# Patient Record
Sex: MALE | Race: OTHER | Employment: UNEMPLOYED | URBAN - METROPOLITAN AREA
[De-identification: names, ages, dates, MRNs, and addresses within clinical notes are randomized per-mention and may not be internally consistent; named-entity substitution may affect disease eponyms.]

---

## 2020-01-01 ENCOUNTER — APPOINTMENT (OUTPATIENT)
Dept: GENERAL RADIOLOGY | Age: 80
DRG: 161 | End: 2020-01-01
Attending: INTERNAL MEDICINE
Payer: MEDICAID

## 2020-01-01 ENCOUNTER — APPOINTMENT (OUTPATIENT)
Dept: CT IMAGING | Age: 80
DRG: 161 | End: 2020-01-01
Attending: PSYCHIATRY & NEUROLOGY
Payer: MEDICAID

## 2020-01-01 ENCOUNTER — APPOINTMENT (OUTPATIENT)
Dept: CT IMAGING | Age: 80
DRG: 161 | End: 2020-01-01
Attending: INTERNAL MEDICINE
Payer: MEDICAID

## 2020-01-01 ENCOUNTER — HOSPITAL ENCOUNTER (EMERGENCY)
Age: 80
Discharge: ANOTHER HOSPITAL | DRG: 161 | End: 2020-09-22
Attending: EMERGENCY MEDICINE
Payer: MEDICAID

## 2020-01-01 ENCOUNTER — APPOINTMENT (OUTPATIENT)
Dept: GENERAL RADIOLOGY | Age: 80
DRG: 161 | End: 2020-01-01
Attending: NURSE PRACTITIONER
Payer: MEDICAID

## 2020-01-01 ENCOUNTER — HOSPITAL ENCOUNTER (INPATIENT)
Age: 80
LOS: 11 days | DRG: 161 | End: 2020-10-03
Attending: INTERNAL MEDICINE | Admitting: INTERNAL MEDICINE
Payer: MEDICAID

## 2020-01-01 VITALS
WEIGHT: 180 LBS | HEART RATE: 99 BPM | DIASTOLIC BLOOD PRESSURE: 99 MMHG | BODY MASS INDEX: 28.93 KG/M2 | RESPIRATION RATE: 28 BRPM | OXYGEN SATURATION: 94 % | SYSTOLIC BLOOD PRESSURE: 176 MMHG | TEMPERATURE: 97.8 F | HEIGHT: 66 IN

## 2020-01-01 VITALS
WEIGHT: 182.98 LBS | RESPIRATION RATE: 40 BRPM | BODY MASS INDEX: 29.41 KG/M2 | DIASTOLIC BLOOD PRESSURE: 101 MMHG | SYSTOLIC BLOOD PRESSURE: 120 MMHG | HEIGHT: 66 IN | TEMPERATURE: 97.5 F | OXYGEN SATURATION: 80 %

## 2020-01-01 DIAGNOSIS — G93.40 ENCEPHALOPATHY: ICD-10-CM

## 2020-01-01 DIAGNOSIS — I95.89 OTHER SPECIFIED HYPOTENSION: ICD-10-CM

## 2020-01-01 DIAGNOSIS — R93.2 ABNORMAL CT SCAN, GALLBLADDER: ICD-10-CM

## 2020-01-01 DIAGNOSIS — J18.9 PNEUMONIA DUE TO INFECTIOUS ORGANISM, UNSPECIFIED LATERALITY, UNSPECIFIED PART OF LUNG: ICD-10-CM

## 2020-01-01 DIAGNOSIS — I21.09 ACUTE MI ANTERIOR WALL FIRST EPISODE CARE (HCC): Primary | ICD-10-CM

## 2020-01-01 DIAGNOSIS — N17.9 ACUTE RENAL FAILURE, UNSPECIFIED ACUTE RENAL FAILURE TYPE (HCC): ICD-10-CM

## 2020-01-01 DIAGNOSIS — R13.10 DYSPHAGIA, UNSPECIFIED TYPE: ICD-10-CM

## 2020-01-01 DIAGNOSIS — R54 FRAILTY: ICD-10-CM

## 2020-01-01 DIAGNOSIS — R57.0 CARDIOGENIC SHOCK (HCC): ICD-10-CM

## 2020-01-01 DIAGNOSIS — E11.9 CONTROLLED TYPE 2 DIABETES MELLITUS WITHOUT COMPLICATION, WITHOUT LONG-TERM CURRENT USE OF INSULIN (HCC): ICD-10-CM

## 2020-01-01 DIAGNOSIS — I47.29 NSVT (NONSUSTAINED VENTRICULAR TACHYCARDIA): ICD-10-CM

## 2020-01-01 DIAGNOSIS — Z51.5 ENCOUNTER FOR PALLIATIVE CARE: ICD-10-CM

## 2020-01-01 DIAGNOSIS — Z71.89 ACP (ADVANCE CARE PLANNING): ICD-10-CM

## 2020-01-01 DIAGNOSIS — I10 ESSENTIAL HYPERTENSION: ICD-10-CM

## 2020-01-01 DIAGNOSIS — I21.02 ACUTE ST ELEVATION MYOCARDIAL INFARCTION (STEMI) INVOLVING LEFT ANTERIOR DESCENDING (LAD) CORONARY ARTERY (HCC): ICD-10-CM

## 2020-01-01 DIAGNOSIS — R74.8 ELEVATED LIPASE: ICD-10-CM

## 2020-01-01 DIAGNOSIS — R10.84 GENERALIZED ABDOMINAL PAIN: ICD-10-CM

## 2020-01-01 DIAGNOSIS — I21.02 ST ELEVATION MYOCARDIAL INFARCTION INVOLVING LEFT ANTERIOR DESCENDING (LAD) CORONARY ARTERY (HCC): ICD-10-CM

## 2020-01-01 DIAGNOSIS — R06.02 SHORTNESS OF BREATH: ICD-10-CM

## 2020-01-01 DIAGNOSIS — R41.0 DELIRIUM: ICD-10-CM

## 2020-01-01 LAB
ACT BLD: 120 SECS (ref 70–128)
ACT BLD: 142 SECS (ref 70–128)
ACT BLD: 142 SECS (ref 70–128)
ACT BLD: 147 SECS (ref 70–128)
ACT BLD: 153 SECS (ref 70–128)
ACT BLD: 158 SECS (ref 70–128)
ACT BLD: 164 SECS (ref 70–128)
ACT BLD: 169 SECS (ref 70–128)
ACT BLD: 175 SECS (ref 70–128)
ALBUMIN SERPL-MCNC: 2 G/DL (ref 3.2–4.6)
ALBUMIN SERPL-MCNC: 2.8 G/DL (ref 3.2–4.6)
ALBUMIN SERPL-MCNC: 4.5 G/DL (ref 3.2–4.6)
ALBUMIN/GLOB SERPL: 0.4 {RATIO} (ref 1.2–3.5)
ALBUMIN/GLOB SERPL: 0.6 {RATIO} (ref 1.2–3.5)
ALBUMIN/GLOB SERPL: 1 {RATIO} (ref 1.2–3.5)
ALP SERPL-CCNC: 149 U/L (ref 50–136)
ALP SERPL-CCNC: 178 U/L (ref 50–136)
ALP SERPL-CCNC: 86 U/L (ref 50–136)
ALT SERPL-CCNC: 421 U/L (ref 12–65)
ALT SERPL-CCNC: 53 U/L (ref 12–65)
ALT SERPL-CCNC: 58 U/L (ref 12–65)
ANION GAP SERPL CALC-SCNC: 10 MMOL/L (ref 7–16)
ANION GAP SERPL CALC-SCNC: 10 MMOL/L (ref 7–16)
ANION GAP SERPL CALC-SCNC: 11 MMOL/L (ref 7–16)
ANION GAP SERPL CALC-SCNC: 13 MMOL/L (ref 7–16)
ANION GAP SERPL CALC-SCNC: 15 MMOL/L (ref 7–16)
ANION GAP SERPL CALC-SCNC: 16 MMOL/L (ref 7–16)
ANION GAP SERPL CALC-SCNC: 7 MMOL/L (ref 7–16)
ANION GAP SERPL CALC-SCNC: 7 MMOL/L (ref 7–16)
ANION GAP SERPL CALC-SCNC: 9 MMOL/L (ref 7–16)
APPEARANCE UR: ABNORMAL
APTT PPP: 61 SEC (ref 24.3–35.4)
ARTERIAL PATENCY WRIST A: YES
AST SERPL-CCNC: 1531 U/L (ref 15–37)
AST SERPL-CCNC: 37 U/L (ref 15–37)
AST SERPL-CCNC: 87 U/L (ref 15–37)
ATRIAL RATE: 100 BPM
ATRIAL RATE: 85 BPM
ATRIAL RATE: 86 BPM
ATRIAL RATE: 91 BPM
ATRIAL RATE: 97 BPM
BACTERIA SPEC CULT: NORMAL
BACTERIA URNS QL MICRO: 0 /HPF
BASE EXCESS BLD CALC-SCNC: 3 MMOL/L
BASOPHILS # BLD: 0 K/UL (ref 0–0.2)
BASOPHILS # BLD: 0.1 K/UL (ref 0–0.2)
BASOPHILS NFR BLD: 0 % (ref 0–2)
BASOPHILS NFR BLD: 1 % (ref 0–2)
BASOPHILS NFR BLD: 1 % (ref 0–2)
BDY SITE: ABNORMAL
BILIRUB DIRECT SERPL-MCNC: 0.4 MG/DL
BILIRUB DIRECT SERPL-MCNC: 0.5 MG/DL
BILIRUB INDIRECT SERPL-MCNC: 2.2 MG/DL (ref 0–1.1)
BILIRUB SERPL-MCNC: 0.6 MG/DL (ref 0.2–1.1)
BILIRUB SERPL-MCNC: 0.9 MG/DL (ref 0.2–1.1)
BILIRUB SERPL-MCNC: 2.7 MG/DL (ref 0.2–1.1)
BILIRUB UR QL: ABNORMAL
BUN SERPL-MCNC: 104 MG/DL (ref 8–23)
BUN SERPL-MCNC: 105 MG/DL (ref 8–23)
BUN SERPL-MCNC: 112 MG/DL (ref 8–23)
BUN SERPL-MCNC: 12 MG/DL (ref 8–23)
BUN SERPL-MCNC: 31 MG/DL (ref 8–23)
BUN SERPL-MCNC: 41 MG/DL (ref 8–23)
BUN SERPL-MCNC: 46 MG/DL (ref 8–23)
BUN SERPL-MCNC: 46 MG/DL (ref 8–23)
BUN SERPL-MCNC: 47 MG/DL (ref 8–23)
BUN SERPL-MCNC: 60 MG/DL (ref 8–23)
BUN SERPL-MCNC: 72 MG/DL (ref 8–23)
CALCIUM SERPL-MCNC: 10.1 MG/DL (ref 8.3–10.4)
CALCIUM SERPL-MCNC: 7.8 MG/DL (ref 8.3–10.4)
CALCIUM SERPL-MCNC: 8.2 MG/DL (ref 8.3–10.4)
CALCIUM SERPL-MCNC: 8.4 MG/DL (ref 8.3–10.4)
CALCIUM SERPL-MCNC: 8.5 MG/DL (ref 8.3–10.4)
CALCIUM SERPL-MCNC: 8.5 MG/DL (ref 8.3–10.4)
CALCIUM SERPL-MCNC: 8.6 MG/DL (ref 8.3–10.4)
CALCIUM SERPL-MCNC: 8.7 MG/DL (ref 8.3–10.4)
CALCIUM SERPL-MCNC: 9 MG/DL (ref 8.3–10.4)
CALCULATED P AXIS, ECG09: 50 DEGREES
CALCULATED P AXIS, ECG09: 52 DEGREES
CALCULATED P AXIS, ECG09: 54 DEGREES
CALCULATED P AXIS, ECG09: 57 DEGREES
CALCULATED P AXIS, ECG09: 72 DEGREES
CALCULATED R AXIS, ECG10: -64 DEGREES
CALCULATED R AXIS, ECG10: -72 DEGREES
CALCULATED R AXIS, ECG10: -79 DEGREES
CALCULATED R AXIS, ECG10: 46 DEGREES
CALCULATED R AXIS, ECG10: 50 DEGREES
CALCULATED T AXIS, ECG11: 43 DEGREES
CALCULATED T AXIS, ECG11: 53 DEGREES
CALCULATED T AXIS, ECG11: 87 DEGREES
CALCULATED T AXIS, ECG11: 88 DEGREES
CALCULATED T AXIS, ECG11: 95 DEGREES
CHLORIDE SERPL-SCNC: 100 MMOL/L (ref 98–107)
CHLORIDE SERPL-SCNC: 101 MMOL/L (ref 98–107)
CHLORIDE SERPL-SCNC: 101 MMOL/L (ref 98–107)
CHLORIDE SERPL-SCNC: 102 MMOL/L (ref 98–107)
CHLORIDE SERPL-SCNC: 103 MMOL/L (ref 98–107)
CHLORIDE SERPL-SCNC: 103 MMOL/L (ref 98–107)
CHLORIDE SERPL-SCNC: 105 MMOL/L (ref 98–107)
CHLORIDE SERPL-SCNC: 98 MMOL/L (ref 98–107)
CHLORIDE SERPL-SCNC: 99 MMOL/L (ref 98–107)
CHOLEST SERPL-MCNC: 132 MG/DL
CO2 BLD-SCNC: 27 MMOL/L
CO2 SERPL-SCNC: 15 MMOL/L (ref 21–32)
CO2 SERPL-SCNC: 21 MMOL/L (ref 21–32)
CO2 SERPL-SCNC: 22 MMOL/L (ref 21–32)
CO2 SERPL-SCNC: 23 MMOL/L (ref 21–32)
CO2 SERPL-SCNC: 25 MMOL/L (ref 21–32)
CO2 SERPL-SCNC: 26 MMOL/L (ref 21–32)
CO2 SERPL-SCNC: 26 MMOL/L (ref 21–32)
CO2 SERPL-SCNC: 28 MMOL/L (ref 21–32)
CO2 SERPL-SCNC: 28 MMOL/L (ref 21–32)
CO2 SERPL-SCNC: 30 MMOL/L (ref 21–32)
CO2 SERPL-SCNC: 30 MMOL/L (ref 21–32)
COLLECT TIME,HTIME: 1607
COLOR UR: ABNORMAL
CREAT SERPL-MCNC: 1.42 MG/DL (ref 0.8–1.5)
CREAT SERPL-MCNC: 2.7 MG/DL (ref 0.8–1.5)
CREAT SERPL-MCNC: 3.44 MG/DL (ref 0.8–1.5)
CREAT SERPL-MCNC: 3.57 MG/DL (ref 0.8–1.5)
CREAT SERPL-MCNC: 4 MG/DL (ref 0.8–1.5)
CREAT SERPL-MCNC: 4.29 MG/DL (ref 0.8–1.5)
CREAT SERPL-MCNC: 5.01 MG/DL (ref 0.8–1.5)
CREAT SERPL-MCNC: 6.49 MG/DL (ref 0.8–1.5)
CREAT SERPL-MCNC: 6.7 MG/DL (ref 0.8–1.5)
CREAT SERPL-MCNC: 6.98 MG/DL (ref 0.8–1.5)
CREAT SERPL-MCNC: 7.63 MG/DL (ref 0.8–1.5)
DIAGNOSIS, 93000: NORMAL
DIFFERENTIAL METHOD BLD: ABNORMAL
EOSINOPHIL # BLD: 0 K/UL (ref 0–0.8)
EOSINOPHIL # BLD: 0.1 K/UL (ref 0–0.8)
EOSINOPHIL # BLD: 0.2 K/UL (ref 0–0.8)
EOSINOPHIL # BLD: 0.2 K/UL (ref 0–0.8)
EOSINOPHIL # BLD: 0.3 K/UL (ref 0–0.8)
EOSINOPHIL NFR BLD: 0 % (ref 0.5–7.8)
EOSINOPHIL NFR BLD: 1 % (ref 0.5–7.8)
EOSINOPHIL NFR BLD: 2 % (ref 0.5–7.8)
EPI CELLS #/AREA URNS HPF: ABNORMAL /HPF
ERYTHROCYTE [DISTWIDTH] IN BLOOD BY AUTOMATED COUNT: 13.2 % (ref 11.9–14.6)
ERYTHROCYTE [DISTWIDTH] IN BLOOD BY AUTOMATED COUNT: 13.6 % (ref 11.9–14.6)
ERYTHROCYTE [DISTWIDTH] IN BLOOD BY AUTOMATED COUNT: 14.2 % (ref 11.9–14.6)
ERYTHROCYTE [DISTWIDTH] IN BLOOD BY AUTOMATED COUNT: 14.4 % (ref 11.9–14.6)
ERYTHROCYTE [DISTWIDTH] IN BLOOD BY AUTOMATED COUNT: 14.5 % (ref 11.9–14.6)
ERYTHROCYTE [DISTWIDTH] IN BLOOD BY AUTOMATED COUNT: 14.6 % (ref 11.9–14.6)
ERYTHROCYTE [DISTWIDTH] IN BLOOD BY AUTOMATED COUNT: 14.6 % (ref 11.9–14.6)
ERYTHROCYTE [DISTWIDTH] IN BLOOD BY AUTOMATED COUNT: 14.7 % (ref 11.9–14.6)
ERYTHROCYTE [DISTWIDTH] IN BLOOD BY AUTOMATED COUNT: 14.7 % (ref 11.9–14.6)
ERYTHROCYTE [DISTWIDTH] IN BLOOD BY AUTOMATED COUNT: 14.8 % (ref 11.9–14.6)
ERYTHROCYTE [DISTWIDTH] IN BLOOD BY AUTOMATED COUNT: 14.9 % (ref 11.9–14.6)
ERYTHROCYTE [DISTWIDTH] IN BLOOD BY AUTOMATED COUNT: 15.2 % (ref 11.9–14.6)
ERYTHROCYTE [DISTWIDTH] IN BLOOD BY AUTOMATED COUNT: 15.3 % (ref 11.9–14.6)
ERYTHROCYTE [DISTWIDTH] IN BLOOD BY AUTOMATED COUNT: 15.4 % (ref 11.9–14.6)
ERYTHROCYTE [DISTWIDTH] IN BLOOD BY AUTOMATED COUNT: 15.8 % (ref 11.9–14.6)
EST. AVERAGE GLUCOSE BLD GHB EST-MCNC: 183 MG/DL
FLOW RATE ISTAT,IFRATE: 3 L/MIN
GAS FLOW.O2 O2 DELIVERY SYS: ABNORMAL L/MIN
GLOBULIN SER CALC-MCNC: 4.4 G/DL (ref 2.3–3.5)
GLOBULIN SER CALC-MCNC: 4.6 G/DL (ref 2.3–3.5)
GLOBULIN SER CALC-MCNC: 5 G/DL (ref 2.3–3.5)
GLUCOSE BLD STRIP.AUTO-MCNC: 100 MG/DL (ref 65–100)
GLUCOSE BLD STRIP.AUTO-MCNC: 102 MG/DL (ref 65–100)
GLUCOSE BLD STRIP.AUTO-MCNC: 116 MG/DL (ref 65–100)
GLUCOSE BLD STRIP.AUTO-MCNC: 119 MG/DL (ref 65–100)
GLUCOSE BLD STRIP.AUTO-MCNC: 121 MG/DL (ref 65–100)
GLUCOSE BLD STRIP.AUTO-MCNC: 135 MG/DL (ref 65–100)
GLUCOSE BLD STRIP.AUTO-MCNC: 149 MG/DL (ref 65–100)
GLUCOSE BLD STRIP.AUTO-MCNC: 154 MG/DL (ref 65–100)
GLUCOSE BLD STRIP.AUTO-MCNC: 184 MG/DL (ref 65–100)
GLUCOSE BLD STRIP.AUTO-MCNC: 192 MG/DL (ref 65–100)
GLUCOSE BLD STRIP.AUTO-MCNC: 198 MG/DL (ref 65–100)
GLUCOSE BLD STRIP.AUTO-MCNC: 199 MG/DL (ref 65–100)
GLUCOSE BLD STRIP.AUTO-MCNC: 205 MG/DL (ref 65–100)
GLUCOSE BLD STRIP.AUTO-MCNC: 206 MG/DL (ref 65–100)
GLUCOSE BLD STRIP.AUTO-MCNC: 212 MG/DL (ref 65–100)
GLUCOSE BLD STRIP.AUTO-MCNC: 221 MG/DL (ref 65–100)
GLUCOSE BLD STRIP.AUTO-MCNC: 223 MG/DL (ref 65–100)
GLUCOSE BLD STRIP.AUTO-MCNC: 226 MG/DL (ref 65–100)
GLUCOSE BLD STRIP.AUTO-MCNC: 226 MG/DL (ref 65–100)
GLUCOSE BLD STRIP.AUTO-MCNC: 246 MG/DL (ref 65–100)
GLUCOSE BLD STRIP.AUTO-MCNC: 249 MG/DL (ref 65–100)
GLUCOSE BLD STRIP.AUTO-MCNC: 251 MG/DL (ref 65–100)
GLUCOSE BLD STRIP.AUTO-MCNC: 260 MG/DL (ref 65–100)
GLUCOSE BLD STRIP.AUTO-MCNC: 261 MG/DL (ref 65–100)
GLUCOSE BLD STRIP.AUTO-MCNC: 265 MG/DL (ref 65–100)
GLUCOSE BLD STRIP.AUTO-MCNC: 273 MG/DL (ref 65–100)
GLUCOSE BLD STRIP.AUTO-MCNC: 279 MG/DL (ref 65–100)
GLUCOSE BLD STRIP.AUTO-MCNC: 286 MG/DL (ref 65–100)
GLUCOSE BLD STRIP.AUTO-MCNC: 289 MG/DL (ref 65–100)
GLUCOSE BLD STRIP.AUTO-MCNC: 294 MG/DL (ref 65–100)
GLUCOSE BLD STRIP.AUTO-MCNC: 294 MG/DL (ref 65–100)
GLUCOSE BLD STRIP.AUTO-MCNC: 295 MG/DL (ref 65–100)
GLUCOSE BLD STRIP.AUTO-MCNC: 299 MG/DL (ref 65–100)
GLUCOSE BLD STRIP.AUTO-MCNC: 311 MG/DL (ref 65–100)
GLUCOSE BLD STRIP.AUTO-MCNC: 319 MG/DL (ref 65–100)
GLUCOSE BLD STRIP.AUTO-MCNC: 330 MG/DL (ref 65–100)
GLUCOSE BLD STRIP.AUTO-MCNC: 332 MG/DL (ref 65–100)
GLUCOSE BLD STRIP.AUTO-MCNC: 342 MG/DL (ref 65–100)
GLUCOSE BLD STRIP.AUTO-MCNC: 363 MG/DL (ref 65–100)
GLUCOSE BLD STRIP.AUTO-MCNC: 373 MG/DL (ref 65–100)
GLUCOSE BLD STRIP.AUTO-MCNC: 378 MG/DL (ref 65–100)
GLUCOSE BLD STRIP.AUTO-MCNC: 390 MG/DL (ref 65–100)
GLUCOSE BLD STRIP.AUTO-MCNC: 406 MG/DL (ref 65–100)
GLUCOSE BLD STRIP.AUTO-MCNC: 410 MG/DL (ref 65–100)
GLUCOSE BLD STRIP.AUTO-MCNC: 449 MG/DL (ref 65–100)
GLUCOSE BLD STRIP.AUTO-MCNC: 470 MG/DL (ref 65–100)
GLUCOSE SERPL-MCNC: 169 MG/DL (ref 65–100)
GLUCOSE SERPL-MCNC: 205 MG/DL (ref 65–100)
GLUCOSE SERPL-MCNC: 254 MG/DL (ref 65–100)
GLUCOSE SERPL-MCNC: 254 MG/DL (ref 65–100)
GLUCOSE SERPL-MCNC: 255 MG/DL (ref 65–100)
GLUCOSE SERPL-MCNC: 274 MG/DL (ref 65–100)
GLUCOSE SERPL-MCNC: 283 MG/DL (ref 65–100)
GLUCOSE SERPL-MCNC: 292 MG/DL (ref 65–100)
GLUCOSE SERPL-MCNC: 294 MG/DL (ref 65–100)
GLUCOSE SERPL-MCNC: 308 MG/DL (ref 65–100)
GLUCOSE SERPL-MCNC: 454 MG/DL (ref 65–100)
GLUCOSE UR STRIP.AUTO-MCNC: NEGATIVE MG/DL
HBA1C MFR BLD: 8 % (ref 4.8–6)
HBV SURFACE AG SER QL: NONREACTIVE
HCO3 BLD-SCNC: 25.7 MMOL/L (ref 22–26)
HCT VFR BLD AUTO: 24.4 % (ref 41.1–50.3)
HCT VFR BLD AUTO: 24.5 % (ref 41.1–50.3)
HCT VFR BLD AUTO: 24.6 % (ref 41.1–50.3)
HCT VFR BLD AUTO: 24.6 % (ref 41.1–50.3)
HCT VFR BLD AUTO: 27.6 % (ref 41.1–50.3)
HCT VFR BLD AUTO: 31.7 % (ref 41.1–50.3)
HCT VFR BLD AUTO: 31.7 % (ref 41.1–50.3)
HCT VFR BLD AUTO: 32.2 % (ref 41.1–50.3)
HCT VFR BLD AUTO: 32.3 % (ref 41.1–50.3)
HCT VFR BLD AUTO: 32.9 % (ref 41.1–50.3)
HCT VFR BLD AUTO: 36.9 % (ref 41.1–50.3)
HCT VFR BLD AUTO: 38.7 % (ref 41.1–50.3)
HCT VFR BLD AUTO: 44.1 % (ref 41.1–50.3)
HCT VFR BLD AUTO: 49.2 % (ref 41.1–50.3)
HCT VFR BLD AUTO: 52.1 % (ref 41.1–50.3)
HDLC SERPL-MCNC: 48 MG/DL (ref 40–60)
HDLC SERPL: 2.8 {RATIO}
HGB BLD-MCNC: 10.8 G/DL (ref 13.6–17.2)
HGB BLD-MCNC: 11 G/DL (ref 13.6–17.2)
HGB BLD-MCNC: 11.1 G/DL (ref 13.6–17.2)
HGB BLD-MCNC: 11.2 G/DL (ref 13.6–17.2)
HGB BLD-MCNC: 11.6 G/DL (ref 13.6–17.2)
HGB BLD-MCNC: 12.7 G/DL (ref 13.6–17.2)
HGB BLD-MCNC: 13.5 G/DL (ref 13.6–17.2)
HGB BLD-MCNC: 15.3 G/DL (ref 13.6–17.2)
HGB BLD-MCNC: 16.4 G/DL (ref 13.6–17.2)
HGB BLD-MCNC: 17.9 G/DL (ref 13.6–17.2)
HGB BLD-MCNC: 7.9 G/DL (ref 13.6–17.2)
HGB BLD-MCNC: 8 G/DL (ref 13.6–17.2)
HGB BLD-MCNC: 8.1 G/DL (ref 13.6–17.2)
HGB BLD-MCNC: 8.4 G/DL (ref 13.6–17.2)
HGB BLD-MCNC: 9.3 G/DL (ref 13.6–17.2)
HGB UR QL STRIP: ABNORMAL
IMM GRANULOCYTES # BLD AUTO: 0.1 K/UL (ref 0–0.5)
IMM GRANULOCYTES # BLD AUTO: 0.2 K/UL (ref 0–0.5)
IMM GRANULOCYTES # BLD AUTO: 0.2 K/UL (ref 0–0.5)
IMM GRANULOCYTES # BLD AUTO: 0.3 K/UL (ref 0–0.5)
IMM GRANULOCYTES # BLD AUTO: 0.4 K/UL (ref 0–0.5)
IMM GRANULOCYTES # BLD AUTO: 0.5 K/UL (ref 0–0.5)
IMM GRANULOCYTES NFR BLD AUTO: 0 % (ref 0–5)
IMM GRANULOCYTES NFR BLD AUTO: 1 % (ref 0–5)
IMM GRANULOCYTES NFR BLD AUTO: 2 % (ref 0–5)
IMM GRANULOCYTES NFR BLD AUTO: 3 % (ref 0–5)
INR PPP: 1.1
INR PPP: 1.2
KETONES UR QL STRIP.AUTO: 40 MG/DL
LACTATE SERPL-SCNC: 1.3 MMOL/L (ref 0.4–2)
LACTATE SERPL-SCNC: 1.6 MMOL/L (ref 0.4–2)
LACTATE SERPL-SCNC: 1.7 MMOL/L (ref 0.4–2)
LACTATE SERPL-SCNC: 1.9 MMOL/L (ref 0.4–2)
LACTATE SERPL-SCNC: 10 MMOL/L (ref 0.4–2)
LACTATE SERPL-SCNC: 2.1 MMOL/L (ref 0.4–2)
LACTATE SERPL-SCNC: 2.2 MMOL/L (ref 0.4–2)
LACTATE SERPL-SCNC: 2.5 MMOL/L (ref 0.4–2)
LACTATE SERPL-SCNC: 3.4 MMOL/L (ref 0.4–2)
LACTATE SERPL-SCNC: 7.4 MMOL/L (ref 0.4–2)
LDLC SERPL CALC-MCNC: 51.4 MG/DL
LEUKOCYTE ESTERASE UR QL STRIP.AUTO: ABNORMAL
LIPASE SERPL-CCNC: 2463 U/L (ref 73–393)
LIPID PROFILE,FLP: ABNORMAL
LYMPHOCYTES # BLD: 1.2 K/UL (ref 0.5–4.6)
LYMPHOCYTES # BLD: 1.5 K/UL (ref 0.5–4.6)
LYMPHOCYTES # BLD: 1.6 K/UL (ref 0.5–4.6)
LYMPHOCYTES # BLD: 1.7 K/UL (ref 0.5–4.6)
LYMPHOCYTES # BLD: 1.7 K/UL (ref 0.5–4.6)
LYMPHOCYTES # BLD: 1.9 K/UL (ref 0.5–4.6)
LYMPHOCYTES # BLD: 1.9 K/UL (ref 0.5–4.6)
LYMPHOCYTES # BLD: 2 K/UL (ref 0.5–4.6)
LYMPHOCYTES # BLD: 2 K/UL (ref 0.5–4.6)
LYMPHOCYTES # BLD: 2.1 K/UL (ref 0.5–4.6)
LYMPHOCYTES # BLD: 2.3 K/UL (ref 0.5–4.6)
LYMPHOCYTES # BLD: 2.3 K/UL (ref 0.5–4.6)
LYMPHOCYTES # BLD: 2.8 K/UL (ref 0.5–4.6)
LYMPHOCYTES # BLD: 2.8 K/UL (ref 0.5–4.6)
LYMPHOCYTES # BLD: 6 K/UL (ref 0.5–4.6)
LYMPHOCYTES NFR BLD: 10 % (ref 13–44)
LYMPHOCYTES NFR BLD: 11 % (ref 13–44)
LYMPHOCYTES NFR BLD: 12 % (ref 13–44)
LYMPHOCYTES NFR BLD: 13 % (ref 13–44)
LYMPHOCYTES NFR BLD: 14 % (ref 13–44)
LYMPHOCYTES NFR BLD: 45 % (ref 13–44)
LYMPHOCYTES NFR BLD: 6 % (ref 13–44)
LYMPHOCYTES NFR BLD: 8 % (ref 13–44)
LYMPHOCYTES NFR BLD: 9 % (ref 13–44)
MAGNESIUM SERPL-MCNC: 1.8 MG/DL (ref 1.8–2.4)
MAGNESIUM SERPL-MCNC: 2.1 MG/DL (ref 1.8–2.4)
MAGNESIUM SERPL-MCNC: 2.3 MG/DL (ref 1.8–2.4)
MCH RBC QN AUTO: 28 PG (ref 26.1–32.9)
MCH RBC QN AUTO: 28.1 PG (ref 26.1–32.9)
MCH RBC QN AUTO: 28.3 PG (ref 26.1–32.9)
MCH RBC QN AUTO: 28.6 PG (ref 26.1–32.9)
MCH RBC QN AUTO: 28.9 PG (ref 26.1–32.9)
MCH RBC QN AUTO: 29.1 PG (ref 26.1–32.9)
MCH RBC QN AUTO: 29.2 PG (ref 26.1–32.9)
MCH RBC QN AUTO: 29.5 PG (ref 26.1–32.9)
MCH RBC QN AUTO: 29.5 PG (ref 26.1–32.9)
MCH RBC QN AUTO: 29.7 PG (ref 26.1–32.9)
MCH RBC QN AUTO: 29.8 PG (ref 26.1–32.9)
MCHC RBC AUTO-ENTMCNC: 32.1 G/DL (ref 31.4–35)
MCHC RBC AUTO-ENTMCNC: 32.8 G/DL (ref 31.4–35)
MCHC RBC AUTO-ENTMCNC: 33.1 G/DL (ref 31.4–35)
MCHC RBC AUTO-ENTMCNC: 33.3 G/DL (ref 31.4–35)
MCHC RBC AUTO-ENTMCNC: 33.7 G/DL (ref 31.4–35)
MCHC RBC AUTO-ENTMCNC: 34 G/DL (ref 31.4–35)
MCHC RBC AUTO-ENTMCNC: 34.1 G/DL (ref 31.4–35)
MCHC RBC AUTO-ENTMCNC: 34.4 G/DL (ref 31.4–35)
MCHC RBC AUTO-ENTMCNC: 34.4 G/DL (ref 31.4–35)
MCHC RBC AUTO-ENTMCNC: 34.7 G/DL (ref 31.4–35)
MCHC RBC AUTO-ENTMCNC: 34.9 G/DL (ref 31.4–35)
MCHC RBC AUTO-ENTMCNC: 35 G/DL (ref 31.4–35)
MCHC RBC AUTO-ENTMCNC: 36 G/DL (ref 31.4–35)
MCV RBC AUTO: 82.6 FL (ref 79.6–97.8)
MCV RBC AUTO: 83.4 FL (ref 79.6–97.8)
MCV RBC AUTO: 84 FL (ref 79.6–97.8)
MCV RBC AUTO: 84.1 FL (ref 79.6–97.8)
MCV RBC AUTO: 84.1 FL (ref 79.6–97.8)
MCV RBC AUTO: 84.7 FL (ref 79.6–97.8)
MCV RBC AUTO: 84.8 FL (ref 79.6–97.8)
MCV RBC AUTO: 85 FL (ref 79.6–97.8)
MCV RBC AUTO: 85.3 FL (ref 79.6–97.8)
MCV RBC AUTO: 85.7 FL (ref 79.6–97.8)
MCV RBC AUTO: 86 FL (ref 79.6–97.8)
MCV RBC AUTO: 86.3 FL (ref 79.6–97.8)
MCV RBC AUTO: 86.3 FL (ref 79.6–97.8)
MCV RBC AUTO: 86.6 FL (ref 79.6–97.8)
MCV RBC AUTO: 87.5 FL (ref 79.6–97.8)
MONOCYTES # BLD: 1.2 K/UL (ref 0.1–1.3)
MONOCYTES # BLD: 1.5 K/UL (ref 0.1–1.3)
MONOCYTES # BLD: 1.5 K/UL (ref 0.1–1.3)
MONOCYTES # BLD: 1.6 K/UL (ref 0.1–1.3)
MONOCYTES # BLD: 1.6 K/UL (ref 0.1–1.3)
MONOCYTES # BLD: 1.8 K/UL (ref 0.1–1.3)
MONOCYTES # BLD: 1.8 K/UL (ref 0.1–1.3)
MONOCYTES # BLD: 1.9 K/UL (ref 0.1–1.3)
MONOCYTES # BLD: 2 K/UL (ref 0.1–1.3)
MONOCYTES # BLD: 2.2 K/UL (ref 0.1–1.3)
MONOCYTES # BLD: 2.2 K/UL (ref 0.1–1.3)
MONOCYTES # BLD: 2.6 K/UL (ref 0.1–1.3)
MONOCYTES NFR BLD: 10 % (ref 4–12)
MONOCYTES NFR BLD: 11 % (ref 4–12)
MONOCYTES NFR BLD: 12 % (ref 4–12)
MONOCYTES NFR BLD: 14 % (ref 4–12)
MONOCYTES NFR BLD: 15 % (ref 4–12)
MONOCYTES NFR BLD: 8 % (ref 4–12)
MONOCYTES NFR BLD: 9 % (ref 4–12)
NEUTS SEG # BLD: 10.4 K/UL (ref 1.7–8.2)
NEUTS SEG # BLD: 11.8 K/UL (ref 1.7–8.2)
NEUTS SEG # BLD: 12.1 K/UL (ref 1.7–8.2)
NEUTS SEG # BLD: 12.8 K/UL (ref 1.7–8.2)
NEUTS SEG # BLD: 14.5 K/UL (ref 1.7–8.2)
NEUTS SEG # BLD: 15 K/UL (ref 1.7–8.2)
NEUTS SEG # BLD: 16.5 K/UL (ref 1.7–8.2)
NEUTS SEG # BLD: 16.8 K/UL (ref 1.7–8.2)
NEUTS SEG # BLD: 16.8 K/UL (ref 1.7–8.2)
NEUTS SEG # BLD: 17 K/UL (ref 1.7–8.2)
NEUTS SEG # BLD: 17.6 K/UL (ref 1.7–8.2)
NEUTS SEG # BLD: 18.6 K/UL (ref 1.7–8.2)
NEUTS SEG # BLD: 19.9 K/UL (ref 1.7–8.2)
NEUTS SEG # BLD: 5.7 K/UL (ref 1.7–8.2)
NEUTS SEG # BLD: 9.5 K/UL (ref 1.7–8.2)
NEUTS SEG NFR BLD: 43 % (ref 43–78)
NEUTS SEG NFR BLD: 66 % (ref 43–78)
NEUTS SEG NFR BLD: 69 % (ref 43–78)
NEUTS SEG NFR BLD: 73 % (ref 43–78)
NEUTS SEG NFR BLD: 76 % (ref 43–78)
NEUTS SEG NFR BLD: 76 % (ref 43–78)
NEUTS SEG NFR BLD: 77 % (ref 43–78)
NEUTS SEG NFR BLD: 77 % (ref 43–78)
NEUTS SEG NFR BLD: 78 % (ref 43–78)
NEUTS SEG NFR BLD: 79 % (ref 43–78)
NEUTS SEG NFR BLD: 83 % (ref 43–78)
NEUTS SEG NFR BLD: 83 % (ref 43–78)
NITRITE UR QL STRIP.AUTO: POSITIVE
NRBC # BLD: 0 K/UL (ref 0–0.2)
NRBC # BLD: 0.02 K/UL (ref 0–0.2)
NRBC # BLD: 0.04 K/UL (ref 0–0.2)
NRBC # BLD: 0.14 K/UL (ref 0–0.2)
NRBC # BLD: 0.14 K/UL (ref 0–0.2)
NRBC # BLD: 0.26 K/UL (ref 0–0.2)
NRBC # BLD: 0.26 K/UL (ref 0–0.2)
NRBC # BLD: 0.35 K/UL (ref 0–0.2)
O2/TOTAL GAS SETTING VFR VENT: 32 %
OTHER OBSERVATIONS,UCOM: ABNORMAL
P-R INTERVAL, ECG05: 128 MS
P-R INTERVAL, ECG05: 140 MS
P-R INTERVAL, ECG05: 140 MS
P-R INTERVAL, ECG05: 144 MS
P-R INTERVAL, ECG05: 144 MS
PCO2 BLD: 31.9 MMHG (ref 35–45)
PH BLD: 7.51 [PH] (ref 7.35–7.45)
PH UR STRIP: 5 [PH] (ref 5–9)
PLATELET # BLD AUTO: 133 K/UL (ref 150–450)
PLATELET # BLD AUTO: 145 K/UL (ref 150–450)
PLATELET # BLD AUTO: 148 K/UL (ref 150–450)
PLATELET # BLD AUTO: 153 K/UL (ref 150–450)
PLATELET # BLD AUTO: 157 K/UL (ref 150–450)
PLATELET # BLD AUTO: 159 K/UL (ref 150–450)
PLATELET # BLD AUTO: 170 K/UL (ref 150–450)
PLATELET # BLD AUTO: 177 K/UL (ref 150–450)
PLATELET # BLD AUTO: 180 K/UL (ref 150–450)
PLATELET # BLD AUTO: 198 K/UL (ref 150–450)
PLATELET # BLD AUTO: 222 K/UL (ref 150–450)
PLATELET # BLD AUTO: 240 K/UL (ref 150–450)
PLATELET # BLD AUTO: 293 K/UL (ref 150–450)
PLATELET # BLD AUTO: 300 K/UL (ref 150–450)
PLATELET # BLD AUTO: 305 K/UL (ref 150–450)
PMV BLD AUTO: 11.1 FL (ref 9.4–12.3)
PMV BLD AUTO: 11.4 FL (ref 9.4–12.3)
PMV BLD AUTO: 11.7 FL (ref 9.4–12.3)
PMV BLD AUTO: 11.8 FL (ref 9.4–12.3)
PMV BLD AUTO: 11.9 FL (ref 9.4–12.3)
PMV BLD AUTO: 11.9 FL (ref 9.4–12.3)
PMV BLD AUTO: 12 FL (ref 9.4–12.3)
PMV BLD AUTO: 12 FL (ref 9.4–12.3)
PMV BLD AUTO: 12.1 FL (ref 9.4–12.3)
PMV BLD AUTO: 12.1 FL (ref 9.4–12.3)
PMV BLD AUTO: 12.2 FL (ref 9.4–12.3)
PMV BLD AUTO: 12.3 FL (ref 9.4–12.3)
PMV BLD AUTO: 12.4 FL (ref 9.4–12.3)
PO2 BLD: 65 MMHG (ref 75–100)
POTASSIUM SERPL-SCNC: 3.5 MMOL/L (ref 3.5–5.1)
POTASSIUM SERPL-SCNC: 4 MMOL/L (ref 3.5–5.1)
POTASSIUM SERPL-SCNC: 4.2 MMOL/L (ref 3.5–5.1)
POTASSIUM SERPL-SCNC: 4.4 MMOL/L (ref 3.5–5.1)
POTASSIUM SERPL-SCNC: 4.5 MMOL/L (ref 3.5–5.1)
POTASSIUM SERPL-SCNC: 4.6 MMOL/L (ref 3.5–5.1)
POTASSIUM SERPL-SCNC: 4.6 MMOL/L (ref 3.5–5.1)
POTASSIUM SERPL-SCNC: 4.8 MMOL/L (ref 3.5–5.1)
POTASSIUM SERPL-SCNC: 4.8 MMOL/L (ref 3.5–5.1)
POTASSIUM SERPL-SCNC: 5.1 MMOL/L (ref 3.5–5.1)
POTASSIUM SERPL-SCNC: 5.6 MMOL/L (ref 3.5–5.1)
POTASSIUM SERPL-SCNC: 5.8 MMOL/L (ref 3.5–5.1)
PROCALCITONIN SERPL-MCNC: 7.85 NG/ML
PROT SERPL-MCNC: 7 G/DL (ref 6.3–8.2)
PROT SERPL-MCNC: 7.2 G/DL (ref 6.3–8.2)
PROT SERPL-MCNC: 9.1 G/DL (ref 6.3–8.2)
PROT UR STRIP-MCNC: 100 MG/DL
PROTHROMBIN TIME: 14.4 SEC (ref 12–14.7)
PROTHROMBIN TIME: 15.7 SEC (ref 12–14.7)
Q-T INTERVAL, ECG07: 334 MS
Q-T INTERVAL, ECG07: 350 MS
Q-T INTERVAL, ECG07: 356 MS
Q-T INTERVAL, ECG07: 362 MS
Q-T INTERVAL, ECG07: 374 MS
QRS DURATION, ECG06: 108 MS
QRS DURATION, ECG06: 84 MS
QRS DURATION, ECG06: 90 MS
QRS DURATION, ECG06: 98 MS
QRS DURATION, ECG06: 98 MS
QTC CALCULATION (BEZET), ECG08: 424 MS
QTC CALCULATION (BEZET), ECG08: 430 MS
QTC CALCULATION (BEZET), ECG08: 437 MS
QTC CALCULATION (BEZET), ECG08: 447 MS
QTC CALCULATION (BEZET), ECG08: 451 MS
RBC # BLD AUTO: 2.81 M/UL (ref 4.23–5.6)
RBC # BLD AUTO: 2.85 M/UL (ref 4.23–5.6)
RBC # BLD AUTO: 2.86 M/UL (ref 4.23–5.6)
RBC # BLD AUTO: 2.86 M/UL (ref 4.23–5.6)
RBC # BLD AUTO: 3.2 M/UL (ref 4.23–5.6)
RBC # BLD AUTO: 3.74 M/UL (ref 4.23–5.6)
RBC # BLD AUTO: 3.8 M/UL (ref 4.23–5.6)
RBC # BLD AUTO: 3.8 M/UL (ref 4.23–5.6)
RBC # BLD AUTO: 3.9 M/UL (ref 4.23–5.6)
RBC # BLD AUTO: 3.91 M/UL (ref 4.23–5.6)
RBC # BLD AUTO: 4.39 M/UL (ref 4.23–5.6)
RBC # BLD AUTO: 4.57 M/UL (ref 4.23–5.6)
RBC # BLD AUTO: 5.13 M/UL (ref 4.23–5.6)
RBC # BLD AUTO: 5.68 M/UL (ref 4.23–5.6)
RBC # BLD AUTO: 6.2 M/UL (ref 4.23–5.6)
RBC #/AREA URNS HPF: ABNORMAL /HPF
SAO2 % BLD: 95 % (ref 95–98)
SERVICE CMNT-IMP: ABNORMAL
SERVICE CMNT-IMP: ABNORMAL
SERVICE CMNT-IMP: NORMAL
SODIUM SERPL-SCNC: 134 MMOL/L (ref 136–145)
SODIUM SERPL-SCNC: 135 MMOL/L (ref 136–145)
SODIUM SERPL-SCNC: 136 MMOL/L (ref 136–145)
SODIUM SERPL-SCNC: 136 MMOL/L (ref 136–145)
SODIUM SERPL-SCNC: 137 MMOL/L (ref 136–145)
SODIUM SERPL-SCNC: 138 MMOL/L (ref 136–145)
SODIUM SERPL-SCNC: 138 MMOL/L (ref 136–145)
SODIUM SERPL-SCNC: 139 MMOL/L (ref 136–145)
SODIUM SERPL-SCNC: 139 MMOL/L (ref 136–145)
SP GR UR REFRACTOMETRY: 1.02 (ref 1–1.02)
SPECIMEN TYPE: ABNORMAL
TRIGL SERPL-MCNC: 163 MG/DL (ref 35–150)
TROPONIN-HIGH SENSITIVITY: 42.5 PG/ML (ref 0–14)
TROPONIN-HIGH SENSITIVITY: ABNORMAL PG/ML (ref 0–14)
UROBILINOGEN UR QL STRIP.AUTO: 1 EU/DL (ref 0.2–1)
VENTRICULAR RATE, ECG03: 100 BPM
VENTRICULAR RATE, ECG03: 85 BPM
VENTRICULAR RATE, ECG03: 86 BPM
VENTRICULAR RATE, ECG03: 91 BPM
VENTRICULAR RATE, ECG03: 97 BPM
VLDLC SERPL CALC-MCNC: 32.6 MG/DL (ref 6–23)
WBC # BLD AUTO: 13.3 K/UL (ref 4.3–11.1)
WBC # BLD AUTO: 14.4 K/UL (ref 4.3–11.1)
WBC # BLD AUTO: 15 K/UL (ref 4.3–11.1)
WBC # BLD AUTO: 15.7 K/UL (ref 4.3–11.1)
WBC # BLD AUTO: 16.3 K/UL (ref 4.3–11.1)
WBC # BLD AUTO: 16.8 K/UL (ref 4.3–11.1)
WBC # BLD AUTO: 18.9 K/UL (ref 4.3–11.1)
WBC # BLD AUTO: 19.1 K/UL (ref 4.3–11.1)
WBC # BLD AUTO: 19.9 K/UL (ref 4.3–11.1)
WBC # BLD AUTO: 20.6 K/UL (ref 4.3–11.1)
WBC # BLD AUTO: 21.2 K/UL (ref 4.3–11.1)
WBC # BLD AUTO: 21.6 K/UL (ref 4.3–11.1)
WBC # BLD AUTO: 22.8 K/UL (ref 4.3–11.1)
WBC # BLD AUTO: 23.6 K/UL (ref 4.3–11.1)
WBC # BLD AUTO: 25.2 K/UL (ref 4.3–11.1)
WBC URNS QL MICRO: ABNORMAL /HPF

## 2020-01-01 PROCEDURE — 77030038269 HC DRN EXT URIN PURWCK BARD -A

## 2020-01-01 PROCEDURE — 74011250636 HC RX REV CODE- 250/636: Performed by: HOSPITALIST

## 2020-01-01 PROCEDURE — 74011000258 HC RX REV CODE- 258: Performed by: HOSPITALIST

## 2020-01-01 PROCEDURE — 74011250637 HC RX REV CODE- 250/637: Performed by: INTERNAL MEDICINE

## 2020-01-01 PROCEDURE — 77010033678 HC OXYGEN DAILY

## 2020-01-01 PROCEDURE — 93503 INSERT/PLACE HEART CATHETER: CPT

## 2020-01-01 PROCEDURE — 74011636637 HC RX REV CODE- 636/637: Performed by: INTERNAL MEDICINE

## 2020-01-01 PROCEDURE — 36415 COLL VENOUS BLD VENIPUNCTURE: CPT

## 2020-01-01 PROCEDURE — 77030004559 HC CATH ANGI DX SUPT CARD -B

## 2020-01-01 PROCEDURE — 99233 SBSQ HOSP IP/OBS HIGH 50: CPT | Performed by: INTERNAL MEDICINE

## 2020-01-01 PROCEDURE — 83605 ASSAY OF LACTIC ACID: CPT

## 2020-01-01 PROCEDURE — 36591 DRAW BLOOD OFF VENOUS DEVICE: CPT

## 2020-01-01 PROCEDURE — 82962 GLUCOSE BLOOD TEST: CPT

## 2020-01-01 PROCEDURE — C1874 STENT, COATED/COV W/DEL SYS: HCPCS

## 2020-01-01 PROCEDURE — 74011636637 HC RX REV CODE- 636/637: Performed by: HOSPITALIST

## 2020-01-01 PROCEDURE — 74011250636 HC RX REV CODE- 250/636: Performed by: NURSE PRACTITIONER

## 2020-01-01 PROCEDURE — 99152 MOD SED SAME PHYS/QHP 5/>YRS: CPT

## 2020-01-01 PROCEDURE — 83735 ASSAY OF MAGNESIUM: CPT

## 2020-01-01 PROCEDURE — 83690 ASSAY OF LIPASE: CPT

## 2020-01-01 PROCEDURE — 2709999900 HC NON-CHARGEABLE SUPPLY

## 2020-01-01 PROCEDURE — B240ZZ3 ULTRASONOGRAPHY OF SINGLE CORONARY ARTERY, INTRAVASCULAR: ICD-10-PCS | Performed by: INTERNAL MEDICINE

## 2020-01-01 PROCEDURE — 74011000636 HC RX REV CODE- 636: Performed by: INTERNAL MEDICINE

## 2020-01-01 PROCEDURE — 77030004950 HC CATH ENTRL NG COVD -A

## 2020-01-01 PROCEDURE — 85025 COMPLETE CBC W/AUTO DIFF WBC: CPT

## 2020-01-01 PROCEDURE — 74011250636 HC RX REV CODE- 250/636: Performed by: INTERNAL MEDICINE

## 2020-01-01 PROCEDURE — 74011250636 HC RX REV CODE- 250/636: Performed by: SURGERY

## 2020-01-01 PROCEDURE — 87340 HEPATITIS B SURFACE AG IA: CPT

## 2020-01-01 PROCEDURE — 65610000001 HC ROOM ICU GENERAL

## 2020-01-01 PROCEDURE — 81001 URINALYSIS AUTO W/SCOPE: CPT

## 2020-01-01 PROCEDURE — 90945 DIALYSIS ONE EVALUATION: CPT

## 2020-01-01 PROCEDURE — 99223 1ST HOSP IP/OBS HIGH 75: CPT | Performed by: PSYCHIATRY & NEUROLOGY

## 2020-01-01 PROCEDURE — 02PA3RZ REMOVAL OF SHORT-TERM EXTERNAL HEART ASSIST SYSTEM FROM HEART, PERCUTANEOUS APPROACH: ICD-10-PCS | Performed by: INTERNAL MEDICINE

## 2020-01-01 PROCEDURE — 99223 1ST HOSP IP/OBS HIGH 75: CPT | Performed by: NURSE PRACTITIONER

## 2020-01-01 PROCEDURE — 71045 X-RAY EXAM CHEST 1 VIEW: CPT

## 2020-01-01 PROCEDURE — 36592 COLLECT BLOOD FROM PICC: CPT

## 2020-01-01 PROCEDURE — 36556 INSERT NON-TUNNEL CV CATH: CPT

## 2020-01-01 PROCEDURE — 80048 BASIC METABOLIC PNL TOTAL CA: CPT

## 2020-01-01 PROCEDURE — C1725 CATH, TRANSLUMIN NON-LASER: HCPCS

## 2020-01-01 PROCEDURE — 83036 HEMOGLOBIN GLYCOSYLATED A1C: CPT

## 2020-01-01 PROCEDURE — 82803 BLOOD GASES ANY COMBINATION: CPT

## 2020-01-01 PROCEDURE — 77030018798 HC PMP KT ENTRL FED COVD -A

## 2020-01-01 PROCEDURE — 85730 THROMBOPLASTIN TIME PARTIAL: CPT

## 2020-01-01 PROCEDURE — 85347 COAGULATION TIME ACTIVATED: CPT

## 2020-01-01 PROCEDURE — 77030041174 HC STOOL COL SYS DIGNSHLD BARD -C

## 2020-01-01 PROCEDURE — C1769 GUIDE WIRE: HCPCS

## 2020-01-01 PROCEDURE — 92610 EVALUATE SWALLOWING FUNCTION: CPT

## 2020-01-01 PROCEDURE — 02HA3RZ INSERTION OF SHORT-TERM EXTERNAL HEART ASSIST SYSTEM INTO HEART, PERCUTANEOUS APPROACH: ICD-10-PCS | Performed by: INTERNAL MEDICINE

## 2020-01-01 PROCEDURE — 93005 ELECTROCARDIOGRAM TRACING: CPT | Performed by: EMERGENCY MEDICINE

## 2020-01-01 PROCEDURE — 84484 ASSAY OF TROPONIN QUANT: CPT

## 2020-01-01 PROCEDURE — 74011000250 HC RX REV CODE- 250: Performed by: NURSE PRACTITIONER

## 2020-01-01 PROCEDURE — 90935 HEMODIALYSIS ONE EVALUATION: CPT

## 2020-01-01 PROCEDURE — C1760 CLOSURE DEV, VASC: HCPCS

## 2020-01-01 PROCEDURE — 92978 ENDOLUMINL IVUS OCT C 1ST: CPT

## 2020-01-01 PROCEDURE — 87086 URINE CULTURE/COLONY COUNT: CPT

## 2020-01-01 PROCEDURE — C1894 INTRO/SHEATH, NON-LASER: HCPCS

## 2020-01-01 PROCEDURE — 96375 TX/PRO/DX INJ NEW DRUG ADDON: CPT

## 2020-01-01 PROCEDURE — 77030015766

## 2020-01-01 PROCEDURE — 87040 BLOOD CULTURE FOR BACTERIA: CPT

## 2020-01-01 PROCEDURE — 99284 EMERGENCY DEPT VISIT MOD MDM: CPT

## 2020-01-01 PROCEDURE — 74011000258 HC RX REV CODE- 258: Performed by: INTERNAL MEDICINE

## 2020-01-01 PROCEDURE — 99232 SBSQ HOSP IP/OBS MODERATE 35: CPT | Performed by: NURSE PRACTITIONER

## 2020-01-01 PROCEDURE — 74011636637 HC RX REV CODE- 636/637: Performed by: FAMILY MEDICINE

## 2020-01-01 PROCEDURE — 74011000250 HC RX REV CODE- 250: Performed by: INTERNAL MEDICINE

## 2020-01-01 PROCEDURE — 74011000258 HC RX REV CODE- 258: Performed by: NURSE PRACTITIONER

## 2020-01-01 PROCEDURE — 4A023N7 MEASUREMENT OF CARDIAC SAMPLING AND PRESSURE, LEFT HEART, PERCUTANEOUS APPROACH: ICD-10-PCS | Performed by: INTERNAL MEDICINE

## 2020-01-01 PROCEDURE — 80076 HEPATIC FUNCTION PANEL: CPT

## 2020-01-01 PROCEDURE — C1751 CATH, INF, PER/CENT/MIDLINE: HCPCS

## 2020-01-01 PROCEDURE — 74011250636 HC RX REV CODE- 250/636

## 2020-01-01 PROCEDURE — 74011250636 HC RX REV CODE- 250/636: Performed by: EMERGENCY MEDICINE

## 2020-01-01 PROCEDURE — 99356 PR PROLONGED SVC I/P OR OBS SETTING 1ST HOUR: CPT | Performed by: NURSE PRACTITIONER

## 2020-01-01 PROCEDURE — 85610 PROTHROMBIN TIME: CPT

## 2020-01-01 PROCEDURE — 99153 MOD SED SAME PHYS/QHP EA: CPT

## 2020-01-01 PROCEDURE — 77030040704 HC NSL TU RETAIN SYS BRDL PRO AMR -B

## 2020-01-01 PROCEDURE — 93308 TTE F-UP OR LMTD: CPT

## 2020-01-01 PROCEDURE — B2111ZZ FLUOROSCOPY OF MULTIPLE CORONARY ARTERIES USING LOW OSMOLAR CONTRAST: ICD-10-PCS | Performed by: INTERNAL MEDICINE

## 2020-01-01 PROCEDURE — 74011000250 HC RX REV CODE- 250: Performed by: SURGERY

## 2020-01-01 PROCEDURE — 74018 RADEX ABDOMEN 1 VIEW: CPT

## 2020-01-01 PROCEDURE — 99285 EMERGENCY DEPT VISIT HI MDM: CPT

## 2020-01-01 PROCEDURE — 99233 SBSQ HOSP IP/OBS HIGH 50: CPT | Performed by: NURSE PRACTITIONER

## 2020-01-01 PROCEDURE — 77030034850

## 2020-01-01 PROCEDURE — 99232 SBSQ HOSP IP/OBS MODERATE 35: CPT | Performed by: INTERNAL MEDICINE

## 2020-01-01 PROCEDURE — 74011250637 HC RX REV CODE- 250/637: Performed by: NURSE PRACTITIONER

## 2020-01-01 PROCEDURE — 70450 CT HEAD/BRAIN W/O DYE: CPT

## 2020-01-01 PROCEDURE — 77030016699 HC CATH ANGI DX INFN1 CARD -A

## 2020-01-01 PROCEDURE — 74011250637 HC RX REV CODE- 250/637: Performed by: EMERGENCY MEDICINE

## 2020-01-01 PROCEDURE — 92941 PRQ TRLML REVSC TOT OCCL AMI: CPT

## 2020-01-01 PROCEDURE — 93005 ELECTROCARDIOGRAM TRACING: CPT | Performed by: INTERNAL MEDICINE

## 2020-01-01 PROCEDURE — 36556 INSERT NON-TUNNEL CV CATH: CPT | Performed by: INTERNAL MEDICINE

## 2020-01-01 PROCEDURE — 96365 THER/PROPH/DIAG IV INF INIT: CPT

## 2020-01-01 PROCEDURE — 77030040393 HC DRSG OPTIFOAM GENT MDII -B

## 2020-01-01 PROCEDURE — 33992 RMVL PERQ LEFT HEART VAD: CPT

## 2020-01-01 PROCEDURE — C1887 CATHETER, GUIDING: HCPCS

## 2020-01-01 PROCEDURE — 33990 INSJ PERQ VAD L HRT ARTERIAL: CPT

## 2020-01-01 PROCEDURE — 74011250637 HC RX REV CODE- 250/637: Performed by: HOSPITALIST

## 2020-01-01 PROCEDURE — 02H633Z INSERTION OF INFUSION DEVICE INTO RIGHT ATRIUM, PERCUTANEOUS APPROACH: ICD-10-PCS | Performed by: INTERNAL MEDICINE

## 2020-01-01 PROCEDURE — 80053 COMPREHEN METABOLIC PANEL: CPT

## 2020-01-01 PROCEDURE — 74011000250 HC RX REV CODE- 250: Performed by: EMERGENCY MEDICINE

## 2020-01-01 PROCEDURE — 36600 WITHDRAWAL OF ARTERIAL BLOOD: CPT

## 2020-01-01 PROCEDURE — 77030029641 HC PMP CARD PERC IMPELLA CP ABIM -L

## 2020-01-01 PROCEDURE — 95816 EEG AWAKE AND DROWSY: CPT | Performed by: PSYCHIATRY & NEUROLOGY

## 2020-01-01 PROCEDURE — B2151ZZ FLUOROSCOPY OF LEFT HEART USING LOW OSMOLAR CONTRAST: ICD-10-PCS | Performed by: INTERNAL MEDICINE

## 2020-01-01 PROCEDURE — L1830 KO IMMOB CANVAS LONG PRE OTS: HCPCS

## 2020-01-01 PROCEDURE — 93306 TTE W/DOPPLER COMPLETE: CPT

## 2020-01-01 PROCEDURE — 5A1D70Z PERFORMANCE OF URINARY FILTRATION, INTERMITTENT, LESS THAN 6 HOURS PER DAY: ICD-10-PCS | Performed by: INTERNAL MEDICINE

## 2020-01-01 PROCEDURE — 80061 LIPID PANEL: CPT

## 2020-01-01 PROCEDURE — 77030040361 HC SLV COMPR DVT MDII -B

## 2020-01-01 PROCEDURE — C8929 TTE W OR WO FOL WCON,DOPPLER: HCPCS

## 2020-01-01 PROCEDURE — 027035Z DILATION OF CORONARY ARTERY, ONE ARTERY WITH TWO DRUG-ELUTING INTRALUMINAL DEVICES, PERCUTANEOUS APPROACH: ICD-10-PCS | Performed by: INTERNAL MEDICINE

## 2020-01-01 PROCEDURE — 99255 IP/OBS CONSLTJ NEW/EST HI 80: CPT | Performed by: SURGERY

## 2020-01-01 PROCEDURE — B548ZZA ULTRASONOGRAPHY OF SUPERIOR VENA CAVA, GUIDANCE: ICD-10-PCS | Performed by: INTERNAL MEDICINE

## 2020-01-01 PROCEDURE — 77030013794 HC KT TRNSDUC BLD EDWD -B

## 2020-01-01 PROCEDURE — C1753 CATH, INTRAVAS ULTRASOUND: HCPCS

## 2020-01-01 PROCEDURE — 77030019569 HC BND COMPR RAD TERU -B

## 2020-01-01 PROCEDURE — 5A0221D ASSISTANCE WITH CARDIAC OUTPUT USING IMPELLER PUMP, CONTINUOUS: ICD-10-PCS | Performed by: INTERNAL MEDICINE

## 2020-01-01 PROCEDURE — 74176 CT ABD & PELVIS W/O CONTRAST: CPT

## 2020-01-01 PROCEDURE — 84132 ASSAY OF SERUM POTASSIUM: CPT

## 2020-01-01 PROCEDURE — 84145 PROCALCITONIN (PCT): CPT

## 2020-01-01 PROCEDURE — 76450000000

## 2020-01-01 PROCEDURE — 93458 L HRT ARTERY/VENTRICLE ANGIO: CPT

## 2020-01-01 PROCEDURE — 77030013687 HC GD NDL BARD -B

## 2020-01-01 PROCEDURE — 99233 SBSQ HOSP IP/OBS HIGH 50: CPT | Performed by: PSYCHIATRY & NEUROLOGY

## 2020-01-01 PROCEDURE — 94760 N-INVAS EAR/PLS OXIMETRY 1: CPT

## 2020-01-01 PROCEDURE — 96374 THER/PROPH/DIAG INJ IV PUSH: CPT

## 2020-01-01 RX ORDER — MAG HYDROX/ALUMINUM HYD/SIMETH 200-200-20
30 SUSPENSION, ORAL (FINAL DOSE FORM) ORAL
Status: DISCONTINUED | OUTPATIENT
Start: 2020-01-01 | End: 2020-01-01 | Stop reason: HOSPADM

## 2020-01-01 RX ORDER — NITROGLYCERIN 20 MG/100ML
0-20 INJECTION INTRAVENOUS
Status: DISCONTINUED | OUTPATIENT
Start: 2020-01-01 | End: 2020-01-01

## 2020-01-01 RX ORDER — FENTANYL CITRATE 50 UG/ML
25-50 INJECTION, SOLUTION INTRAMUSCULAR; INTRAVENOUS
Status: DISCONTINUED | OUTPATIENT
Start: 2020-01-01 | End: 2020-01-01 | Stop reason: SDUPTHER

## 2020-01-01 RX ORDER — FUROSEMIDE 10 MG/ML
40 INJECTION INTRAMUSCULAR; INTRAVENOUS ONCE
Status: COMPLETED | OUTPATIENT
Start: 2020-01-01 | End: 2020-01-01

## 2020-01-01 RX ORDER — DOBUTAMINE HYDROCHLORIDE 200 MG/100ML
2.5 INJECTION INTRAVENOUS
Status: DISCONTINUED | OUTPATIENT
Start: 2020-01-01 | End: 2020-01-01 | Stop reason: HOSPADM

## 2020-01-01 RX ORDER — NOREPINEPHRINE BITARTRATE/D5W 4MG/250ML
.5-16 PLASTIC BAG, INJECTION (ML) INTRAVENOUS
Status: DISCONTINUED | OUTPATIENT
Start: 2020-01-01 | End: 2020-01-01

## 2020-01-01 RX ORDER — HEPARIN SODIUM 5000 [USP'U]/ML
4000 INJECTION, SOLUTION INTRAVENOUS; SUBCUTANEOUS ONCE
Status: COMPLETED | OUTPATIENT
Start: 2020-01-01 | End: 2020-01-01

## 2020-01-01 RX ORDER — AMOXICILLIN 250 MG
1 CAPSULE ORAL DAILY
Status: DISCONTINUED | OUTPATIENT
Start: 2020-01-01 | End: 2020-01-01 | Stop reason: HOSPADM

## 2020-01-01 RX ORDER — ATORVASTATIN CALCIUM 80 MG/1
80 TABLET, FILM COATED ORAL DAILY
Status: DISCONTINUED | OUTPATIENT
Start: 2020-01-01 | End: 2020-01-01 | Stop reason: HOSPADM

## 2020-01-01 RX ORDER — HYDRALAZINE HYDROCHLORIDE 20 MG/ML
10 INJECTION INTRAMUSCULAR; INTRAVENOUS
Status: DISCONTINUED | OUTPATIENT
Start: 2020-01-01 | End: 2020-01-01

## 2020-01-01 RX ORDER — ATORVASTATIN CALCIUM 80 MG/1
80 TABLET, FILM COATED ORAL DAILY
Status: DISCONTINUED | OUTPATIENT
Start: 2020-01-01 | End: 2020-01-01

## 2020-01-01 RX ORDER — MORPHINE SULFATE 2 MG/ML
2 INJECTION, SOLUTION INTRAMUSCULAR; INTRAVENOUS
Status: DISCONTINUED | OUTPATIENT
Start: 2020-01-01 | End: 2020-01-01

## 2020-01-01 RX ORDER — HYDROCHLOROTHIAZIDE 25 MG/1
25 TABLET ORAL DAILY
COMMUNITY

## 2020-01-01 RX ORDER — HYDROCODONE BITARTRATE AND ACETAMINOPHEN 5; 325 MG/1; MG/1
1 TABLET ORAL
Status: DISCONTINUED | OUTPATIENT
Start: 2020-01-01 | End: 2020-01-01

## 2020-01-01 RX ORDER — GUAIFENESIN 100 MG/5ML
324 LIQUID (ML) ORAL
Status: COMPLETED | OUTPATIENT
Start: 2020-01-01 | End: 2020-01-01

## 2020-01-01 RX ORDER — INSULIN LISPRO 100 [IU]/ML
INJECTION, SOLUTION INTRAVENOUS; SUBCUTANEOUS
Status: DISCONTINUED | OUTPATIENT
Start: 2020-01-01 | End: 2020-01-01

## 2020-01-01 RX ORDER — FUROSEMIDE 10 MG/ML
80 INJECTION INTRAMUSCULAR; INTRAVENOUS EVERY 12 HOURS
Status: DISCONTINUED | OUTPATIENT
Start: 2020-01-01 | End: 2020-01-01

## 2020-01-01 RX ORDER — EPINEPHRINE 0.1 MG/ML
1 INJECTION INTRACARDIAC; INTRAVENOUS ONCE
Status: COMPLETED | OUTPATIENT
Start: 2020-01-01 | End: 2020-01-01

## 2020-01-01 RX ORDER — POTASSIUM CHLORIDE 14.9 MG/ML
20 INJECTION INTRAVENOUS ONCE
Status: COMPLETED | OUTPATIENT
Start: 2020-01-01 | End: 2020-01-01

## 2020-01-01 RX ORDER — INSULIN GLARGINE 100 [IU]/ML
35 INJECTION, SOLUTION SUBCUTANEOUS 2 TIMES DAILY
Status: DISCONTINUED | OUTPATIENT
Start: 2020-01-01 | End: 2020-01-01 | Stop reason: HOSPADM

## 2020-01-01 RX ORDER — NOREPINEPHRINE BITARTRATE/D5W 4MG/250ML
PLASTIC BAG, INJECTION (ML) INTRAVENOUS
Status: ACTIVE
Start: 2020-01-01 | End: 2020-01-01

## 2020-01-01 RX ORDER — INSULIN LISPRO 100 [IU]/ML
5 INJECTION, SOLUTION INTRAVENOUS; SUBCUTANEOUS
Status: COMPLETED | OUTPATIENT
Start: 2020-01-01 | End: 2020-01-01

## 2020-01-01 RX ORDER — GUAIFENESIN 100 MG/5ML
81 LIQUID (ML) ORAL DAILY
Status: DISCONTINUED | OUTPATIENT
Start: 2020-01-01 | End: 2020-01-01 | Stop reason: HOSPADM

## 2020-01-01 RX ORDER — INSULIN GLARGINE 100 [IU]/ML
10 INJECTION, SOLUTION SUBCUTANEOUS
Status: COMPLETED | OUTPATIENT
Start: 2020-01-01 | End: 2020-01-01

## 2020-01-01 RX ORDER — ONDANSETRON 2 MG/ML
4 INJECTION INTRAMUSCULAR; INTRAVENOUS
Status: COMPLETED | OUTPATIENT
Start: 2020-01-01 | End: 2020-01-01

## 2020-01-01 RX ORDER — CARVEDILOL 3.12 MG/1
3.12 TABLET ORAL 2 TIMES DAILY WITH MEALS
Status: DISCONTINUED | OUTPATIENT
Start: 2020-01-01 | End: 2020-01-01

## 2020-01-01 RX ORDER — SODIUM CHLORIDE 9 MG/ML
75 INJECTION, SOLUTION INTRAVENOUS CONTINUOUS
Status: DISCONTINUED | OUTPATIENT
Start: 2020-01-01 | End: 2020-01-01

## 2020-01-01 RX ORDER — HYDRALAZINE HYDROCHLORIDE 25 MG/1
25 TABLET, FILM COATED ORAL ONCE
Status: DISCONTINUED | OUTPATIENT
Start: 2020-01-01 | End: 2020-01-01

## 2020-01-01 RX ORDER — LABETALOL HYDROCHLORIDE 5 MG/ML
20 INJECTION, SOLUTION INTRAVENOUS ONCE
Status: DISCONTINUED | OUTPATIENT
Start: 2020-01-01 | End: 2020-01-01

## 2020-01-01 RX ORDER — ACETAMINOPHEN 325 MG/1
650 TABLET ORAL
Status: DISCONTINUED | OUTPATIENT
Start: 2020-01-01 | End: 2020-01-01 | Stop reason: HOSPADM

## 2020-01-01 RX ORDER — NITROGLYCERIN 0.4 MG/1
0.4 TABLET SUBLINGUAL
Status: DISCONTINUED | OUTPATIENT
Start: 2020-01-01 | End: 2020-01-01 | Stop reason: HOSPADM

## 2020-01-01 RX ORDER — HEPARIN SODIUM 10000 [USP'U]/ML
1000-10000 INJECTION, SOLUTION INTRAVENOUS; SUBCUTANEOUS
Status: DISCONTINUED | OUTPATIENT
Start: 2020-01-01 | End: 2020-01-01

## 2020-01-01 RX ORDER — MIDAZOLAM HYDROCHLORIDE 1 MG/ML
.5-2 INJECTION, SOLUTION INTRAMUSCULAR; INTRAVENOUS
Status: DISCONTINUED | OUTPATIENT
Start: 2020-01-01 | End: 2020-01-01

## 2020-01-01 RX ORDER — WARFARIN SODIUM 5 MG/1
5 TABLET ORAL EVERY EVENING
Status: DISCONTINUED | OUTPATIENT
Start: 2020-01-01 | End: 2020-01-01 | Stop reason: HOSPADM

## 2020-01-01 RX ORDER — INSULIN GLARGINE 100 [IU]/ML
10 INJECTION, SOLUTION SUBCUTANEOUS DAILY
Status: DISCONTINUED | OUTPATIENT
Start: 2020-01-01 | End: 2020-01-01

## 2020-01-01 RX ORDER — GLYBURIDE 2.5 MG/1
2.5 TABLET ORAL
Status: DISCONTINUED | OUTPATIENT
Start: 2020-01-01 | End: 2020-01-01

## 2020-01-01 RX ORDER — INSULIN GLARGINE 100 [IU]/ML
38 INJECTION, SOLUTION SUBCUTANEOUS DAILY
Status: DISCONTINUED | OUTPATIENT
Start: 2020-01-01 | End: 2020-01-01

## 2020-01-01 RX ORDER — ONDANSETRON 2 MG/ML
INJECTION INTRAMUSCULAR; INTRAVENOUS
Status: COMPLETED
Start: 2020-01-01 | End: 2020-01-01

## 2020-01-01 RX ORDER — HYDRALAZINE HYDROCHLORIDE 50 MG/1
50 TABLET, FILM COATED ORAL 4 TIMES DAILY
Status: DISCONTINUED | OUTPATIENT
Start: 2020-01-01 | End: 2020-01-01

## 2020-01-01 RX ORDER — INSULIN GLARGINE 100 [IU]/ML
30 INJECTION, SOLUTION SUBCUTANEOUS DAILY
Status: DISCONTINUED | OUTPATIENT
Start: 2020-01-01 | End: 2020-01-01

## 2020-01-01 RX ORDER — CARVEDILOL 6.25 MG/1
6.25 TABLET ORAL 2 TIMES DAILY WITH MEALS
Status: DISCONTINUED | OUTPATIENT
Start: 2020-01-01 | End: 2020-01-01

## 2020-01-01 RX ORDER — HYDROMORPHONE HYDROCHLORIDE 1 MG/ML
0.5 INJECTION, SOLUTION INTRAMUSCULAR; INTRAVENOUS; SUBCUTANEOUS ONCE
Status: COMPLETED | OUTPATIENT
Start: 2020-01-01 | End: 2020-01-01

## 2020-01-01 RX ORDER — FUROSEMIDE 10 MG/ML
40 INJECTION INTRAMUSCULAR; INTRAVENOUS EVERY 12 HOURS
Status: DISCONTINUED | OUTPATIENT
Start: 2020-01-01 | End: 2020-01-01

## 2020-01-01 RX ORDER — HYDRALAZINE HYDROCHLORIDE 25 MG/1
25 TABLET, FILM COATED ORAL 3 TIMES DAILY
Status: DISCONTINUED | OUTPATIENT
Start: 2020-01-01 | End: 2020-01-01

## 2020-01-01 RX ORDER — SODIUM CHLORIDE 0.9 % (FLUSH) 0.9 %
5-40 SYRINGE (ML) INJECTION EVERY 8 HOURS
Status: DISCONTINUED | OUTPATIENT
Start: 2020-01-01 | End: 2020-01-01 | Stop reason: HOSPADM

## 2020-01-01 RX ORDER — HYDRALAZINE HYDROCHLORIDE 25 MG/1
25 TABLET, FILM COATED ORAL 4 TIMES DAILY
Status: DISCONTINUED | OUTPATIENT
Start: 2020-01-01 | End: 2020-01-01

## 2020-01-01 RX ORDER — INSULIN GLARGINE 100 [IU]/ML
20 INJECTION, SOLUTION SUBCUTANEOUS DAILY
Status: DISCONTINUED | OUTPATIENT
Start: 2020-01-01 | End: 2020-01-01

## 2020-01-01 RX ORDER — LIDOCAINE HYDROCHLORIDE 10 MG/ML
2-20 INJECTION, SOLUTION EPIDURAL; INFILTRATION; INTRACAUDAL; PERINEURAL
Status: DISPENSED | OUTPATIENT
Start: 2020-01-01 | End: 2020-01-01

## 2020-01-01 RX ORDER — LABETALOL HYDROCHLORIDE 5 MG/ML
20 INJECTION, SOLUTION INTRAVENOUS
Status: DISCONTINUED | OUTPATIENT
Start: 2020-01-01 | End: 2020-01-01 | Stop reason: HOSPADM

## 2020-01-01 RX ORDER — HEPARIN SODIUM 1000 [USP'U]/ML
5000 INJECTION, SOLUTION INTRAVENOUS; SUBCUTANEOUS
Status: DISCONTINUED | OUTPATIENT
Start: 2020-01-01 | End: 2020-01-01 | Stop reason: HOSPADM

## 2020-01-01 RX ORDER — ATROPINE SULFATE 0.4 MG/ML
0.5 INJECTION, SOLUTION ENDOTRACHEAL; INTRAMEDULLARY; INTRAMUSCULAR; INTRAVENOUS; SUBCUTANEOUS ONCE
Status: COMPLETED | OUTPATIENT
Start: 2020-01-01 | End: 2020-01-01

## 2020-01-01 RX ORDER — INSULIN LISPRO 100 [IU]/ML
INJECTION, SOLUTION INTRAVENOUS; SUBCUTANEOUS EVERY 6 HOURS
Status: DISCONTINUED | OUTPATIENT
Start: 2020-01-01 | End: 2020-01-01 | Stop reason: HOSPADM

## 2020-01-01 RX ORDER — SODIUM CHLORIDE 0.9 % (FLUSH) 0.9 %
5-40 SYRINGE (ML) INJECTION AS NEEDED
Status: DISCONTINUED | OUTPATIENT
Start: 2020-01-01 | End: 2020-01-01 | Stop reason: HOSPADM

## 2020-01-01 RX ORDER — HYDROMORPHONE HYDROCHLORIDE 1 MG/ML
1 INJECTION, SOLUTION INTRAMUSCULAR; INTRAVENOUS; SUBCUTANEOUS
Status: DISCONTINUED | OUTPATIENT
Start: 2020-01-01 | End: 2020-01-01 | Stop reason: HOSPADM

## 2020-01-01 RX ORDER — HYDROMORPHONE HYDROCHLORIDE 1 MG/ML
2 INJECTION, SOLUTION INTRAMUSCULAR; INTRAVENOUS; SUBCUTANEOUS
Status: DISCONTINUED | OUTPATIENT
Start: 2020-01-01 | End: 2020-01-01 | Stop reason: HOSPADM

## 2020-01-01 RX ORDER — INSULIN LISPRO 100 [IU]/ML
5 INJECTION, SOLUTION INTRAVENOUS; SUBCUTANEOUS ONCE
Status: COMPLETED | OUTPATIENT
Start: 2020-01-01 | End: 2020-01-01

## 2020-01-01 RX ORDER — HEPARIN SODIUM 5000 [USP'U]/100ML
12-25 INJECTION, SOLUTION INTRAVENOUS
Status: DISCONTINUED | OUTPATIENT
Start: 2020-01-01 | End: 2020-01-01 | Stop reason: HOSPADM

## 2020-01-01 RX ORDER — FUROSEMIDE 10 MG/ML
20 INJECTION INTRAMUSCULAR; INTRAVENOUS ONCE
Status: COMPLETED | OUTPATIENT
Start: 2020-01-01 | End: 2020-01-01

## 2020-01-01 RX ORDER — HEPARIN SODIUM 1000 [USP'U]/ML
5000 INJECTION, SOLUTION INTRAVENOUS; SUBCUTANEOUS ONCE
Status: COMPLETED | OUTPATIENT
Start: 2020-01-01 | End: 2020-01-01

## 2020-01-01 RX ORDER — HEPARIN SODIUM 200 [USP'U]/100ML
3 INJECTION, SOLUTION INTRAVENOUS CONTINUOUS
Status: DISCONTINUED | OUTPATIENT
Start: 2020-01-01 | End: 2020-01-01

## 2020-01-01 RX ORDER — LORAZEPAM 2 MG/ML
1 INJECTION INTRAMUSCULAR
Status: DISCONTINUED | OUTPATIENT
Start: 2020-01-01 | End: 2020-01-01 | Stop reason: HOSPADM

## 2020-01-01 RX ORDER — MORPHINE SULFATE 4 MG/ML
4 INJECTION INTRAVENOUS
Status: COMPLETED | OUTPATIENT
Start: 2020-01-01 | End: 2020-01-01

## 2020-01-01 RX ORDER — ONDANSETRON 2 MG/ML
4 INJECTION INTRAMUSCULAR; INTRAVENOUS
Status: DISCONTINUED | OUTPATIENT
Start: 2020-01-01 | End: 2020-01-01 | Stop reason: HOSPADM

## 2020-01-01 RX ORDER — CEFAZOLIN SODIUM/WATER 2 G/20 ML
2 SYRINGE (ML) INTRAVENOUS ONCE
Status: COMPLETED | OUTPATIENT
Start: 2020-01-01 | End: 2020-01-01

## 2020-01-01 RX ORDER — FAMOTIDINE 40 MG/5ML
20 POWDER, FOR SUSPENSION ORAL
Status: DISCONTINUED | OUTPATIENT
Start: 2020-01-01 | End: 2020-01-01

## 2020-01-01 RX ORDER — NOREPINEPHRINE BITARTRATE/D5W 4MG/250ML
.5-16 PLASTIC BAG, INJECTION (ML) INTRAVENOUS
Status: DISCONTINUED | OUTPATIENT
Start: 2020-01-01 | End: 2020-01-01 | Stop reason: HOSPADM

## 2020-01-01 RX ORDER — GLYBURIDE-METFORMIN HYDROCHLORIDE 2.5; 5 MG/1; MG/1
2 TABLET ORAL 2 TIMES DAILY WITH MEALS
COMMUNITY

## 2020-01-01 RX ORDER — MIDAZOLAM HYDROCHLORIDE 1 MG/ML
.5-2 INJECTION, SOLUTION INTRAMUSCULAR; INTRAVENOUS
Status: DISCONTINUED | OUTPATIENT
Start: 2020-01-01 | End: 2020-01-01 | Stop reason: SDUPTHER

## 2020-01-01 RX ORDER — HEPARIN SODIUM 5000 [USP'U]/ML
60 INJECTION, SOLUTION INTRAVENOUS; SUBCUTANEOUS ONCE
Status: COMPLETED | OUTPATIENT
Start: 2020-01-01 | End: 2020-01-01

## 2020-01-01 RX ORDER — GUAIFENESIN 100 MG/5ML
81 LIQUID (ML) ORAL DAILY
Status: DISCONTINUED | OUTPATIENT
Start: 2020-01-01 | End: 2020-01-01

## 2020-01-01 RX ORDER — LIDOCAINE HYDROCHLORIDE 10 MG/ML
1-30 INJECTION, SOLUTION EPIDURAL; INFILTRATION; INTRACAUDAL; PERINEURAL ONCE
Status: COMPLETED | OUTPATIENT
Start: 2020-01-01 | End: 2020-01-01

## 2020-01-01 RX ORDER — ATROPINE SULFATE 0.4 MG/ML
1 INJECTION, SOLUTION ENDOTRACHEAL; INTRAMEDULLARY; INTRAMUSCULAR; INTRAVENOUS; SUBCUTANEOUS ONCE
Status: COMPLETED | OUTPATIENT
Start: 2020-01-01 | End: 2020-01-01

## 2020-01-01 RX ORDER — AMLODIPINE BESYLATE 10 MG/1
10 TABLET ORAL DAILY
COMMUNITY

## 2020-01-01 RX ORDER — HYDRALAZINE HYDROCHLORIDE 25 MG/1
25 TABLET, FILM COATED ORAL 4 TIMES DAILY
Status: DISCONTINUED | OUTPATIENT
Start: 2020-01-01 | End: 2020-01-01 | Stop reason: SDUPTHER

## 2020-01-01 RX ORDER — VANCOMYCIN 1.75 GRAM/500 ML IN 0.9 % SODIUM CHLORIDE INTRAVENOUS
1750 ONCE
Status: COMPLETED | OUTPATIENT
Start: 2020-01-01 | End: 2020-01-01

## 2020-01-01 RX ORDER — INSULIN GLARGINE 100 [IU]/ML
33 INJECTION, SOLUTION SUBCUTANEOUS DAILY
Status: DISCONTINUED | OUTPATIENT
Start: 2020-01-01 | End: 2020-01-01

## 2020-01-01 RX ORDER — HEPARIN SODIUM 5000 [USP'U]/100ML
12-25 INJECTION, SOLUTION INTRAVENOUS
Status: DISCONTINUED | OUTPATIENT
Start: 2020-01-01 | End: 2020-01-01

## 2020-01-01 RX ORDER — NITROGLYCERIN 20 MG/100ML
5 INJECTION INTRAVENOUS
Status: COMPLETED | OUTPATIENT
Start: 2020-01-01 | End: 2020-01-01

## 2020-01-01 RX ORDER — FACIAL-BODY WIPES
10 EACH TOPICAL ONCE
Status: COMPLETED | OUTPATIENT
Start: 2020-01-01 | End: 2020-01-01

## 2020-01-01 RX ADMIN — NOREPINEPHRINE-DEXTROSE IV SOLUTION 4 MG/250ML-5% 14 MCG/MIN: 4-5/250 SOLUTION at 22:00

## 2020-01-01 RX ADMIN — HUMAN INSULIN 8 UNITS: 100 INJECTION, SOLUTION SUBCUTANEOUS at 12:08

## 2020-01-01 RX ADMIN — TICAGRELOR 180 MG: 90 TABLET ORAL at 13:08

## 2020-01-01 RX ADMIN — HUMAN INSULIN 6 UNITS: 100 INJECTION, SOLUTION SUBCUTANEOUS at 13:15

## 2020-01-01 RX ADMIN — HUMAN INSULIN 6 UNITS: 100 INJECTION, SOLUTION SUBCUTANEOUS at 04:00

## 2020-01-01 RX ADMIN — LORAZEPAM 1 MG: 2 INJECTION INTRAMUSCULAR; INTRAVENOUS at 02:05

## 2020-01-01 RX ADMIN — Medication 10 ML: at 22:47

## 2020-01-01 RX ADMIN — Medication 10 ML: at 14:00

## 2020-01-01 RX ADMIN — ATORVASTATIN CALCIUM 80 MG: 80 TABLET, FILM COATED ORAL at 08:46

## 2020-01-01 RX ADMIN — INSULIN LISPRO 6 UNITS: 100 INJECTION, SOLUTION INTRAVENOUS; SUBCUTANEOUS at 16:19

## 2020-01-01 RX ADMIN — FUROSEMIDE 40 MG: 10 INJECTION, SOLUTION INTRAMUSCULAR; INTRAVENOUS at 08:10

## 2020-01-01 RX ADMIN — Medication 10 ML: at 14:34

## 2020-01-01 RX ADMIN — ONDANSETRON 4 MG: 2 INJECTION INTRAMUSCULAR; INTRAVENOUS at 14:24

## 2020-01-01 RX ADMIN — LORAZEPAM 1 MG: 2 INJECTION INTRAMUSCULAR; INTRAVENOUS at 12:30

## 2020-01-01 RX ADMIN — DEXMEDETOMIDINE HYDROCHLORIDE 0.2 MCG/KG/HR: 100 INJECTION, SOLUTION INTRAVENOUS at 23:27

## 2020-01-01 RX ADMIN — AMIODARONE HYDROCHLORIDE 0.5 MG/MIN: 50 INJECTION, SOLUTION INTRAVENOUS at 22:03

## 2020-01-01 RX ADMIN — Medication 10 ML: at 13:46

## 2020-01-01 RX ADMIN — INSULIN LISPRO 2 UNITS: 100 INJECTION, SOLUTION INTRAVENOUS; SUBCUTANEOUS at 22:12

## 2020-01-01 RX ADMIN — HYDRALAZINE HYDROCHLORIDE 25 MG: 25 TABLET, FILM COATED ORAL at 17:35

## 2020-01-01 RX ADMIN — ONDANSETRON 4 MG: 2 INJECTION INTRAMUSCULAR; INTRAVENOUS at 04:16

## 2020-01-01 RX ADMIN — LIDOCAINE HYDROCHLORIDE 4 ML: 10 INJECTION, SOLUTION EPIDURAL; INFILTRATION; INTRACAUDAL; PERINEURAL at 11:30

## 2020-01-01 RX ADMIN — INSULIN HUMAN 18 UNITS: 100 INJECTION, SUSPENSION SUBCUTANEOUS at 09:51

## 2020-01-01 RX ADMIN — Medication 1 MG: at 01:47

## 2020-01-01 RX ADMIN — SODIUM CHLORIDE 75 ML/HR: 900 INJECTION, SOLUTION INTRAVENOUS at 15:05

## 2020-01-01 RX ADMIN — TICAGRELOR 90 MG: 90 TABLET ORAL at 18:05

## 2020-01-01 RX ADMIN — INSULIN LISPRO 10 UNITS: 100 INJECTION, SOLUTION INTRAVENOUS; SUBCUTANEOUS at 08:42

## 2020-01-01 RX ADMIN — DEXMEDETOMIDINE HYDROCHLORIDE 0.5 MCG/KG/HR: 100 INJECTION, SOLUTION INTRAVENOUS at 23:24

## 2020-01-01 RX ADMIN — DEXMEDETOMIDINE HYDROCHLORIDE 0.4 MCG/KG/HR: 100 INJECTION, SOLUTION INTRAVENOUS at 23:49

## 2020-01-01 RX ADMIN — HUMAN INSULIN 6 UNITS: 100 INJECTION, SOLUTION SUBCUTANEOUS at 03:41

## 2020-01-01 RX ADMIN — CEFAZOLIN SODIUM 2 G: 100 INJECTION, POWDER, LYOPHILIZED, FOR SOLUTION INTRAVENOUS at 09:53

## 2020-01-01 RX ADMIN — INSULIN LISPRO 6 UNITS: 100 INJECTION, SOLUTION INTRAVENOUS; SUBCUTANEOUS at 22:03

## 2020-01-01 RX ADMIN — FUROSEMIDE 80 MG: 10 INJECTION, SOLUTION INTRAMUSCULAR; INTRAVENOUS at 22:40

## 2020-01-01 RX ADMIN — HEPARIN SODIUM 11 UNITS/KG/HR: 5000 INJECTION, SOLUTION INTRAVENOUS at 10:07

## 2020-01-01 RX ADMIN — FUROSEMIDE 20 MG: 10 INJECTION, SOLUTION INTRAMUSCULAR; INTRAVENOUS at 12:42

## 2020-01-01 RX ADMIN — HEPARIN SODIUM 6 UNITS/KG/HR: 5000 INJECTION, SOLUTION INTRAVENOUS at 18:06

## 2020-01-01 RX ADMIN — INSULIN LISPRO 4 UNITS: 100 INJECTION, SOLUTION INTRAVENOUS; SUBCUTANEOUS at 13:08

## 2020-01-01 RX ADMIN — CEFEPIME HYDROCHLORIDE 2 G: 2 INJECTION, POWDER, FOR SOLUTION INTRAVENOUS at 20:41

## 2020-01-01 RX ADMIN — FUROSEMIDE 80 MG: 10 INJECTION, SOLUTION INTRAMUSCULAR; INTRAVENOUS at 18:51

## 2020-01-01 RX ADMIN — Medication 40 ML: at 14:12

## 2020-01-01 RX ADMIN — CARVEDILOL 3.12 MG: 3.12 TABLET, FILM COATED ORAL at 17:35

## 2020-01-01 RX ADMIN — INSULIN GLARGINE 20 UNITS: 100 INJECTION, SOLUTION SUBCUTANEOUS at 08:47

## 2020-01-01 RX ADMIN — CEFEPIME HYDROCHLORIDE 2 G: 2 INJECTION, POWDER, FOR SOLUTION INTRAVENOUS at 08:29

## 2020-01-01 RX ADMIN — NOREPINEPHRINE-DEXTROSE IV SOLUTION 4 MG/250ML-5% 14 MCG/MIN: 4-5/250 SOLUTION at 23:50

## 2020-01-01 RX ADMIN — ASPIRIN 81 MG: 81 TABLET, CHEWABLE ORAL at 09:51

## 2020-01-01 RX ADMIN — TICAGRELOR 90 MG: 90 TABLET ORAL at 18:00

## 2020-01-01 RX ADMIN — MORPHINE SULFATE 2 MG: 2 INJECTION, SOLUTION INTRAMUSCULAR; INTRAVENOUS at 16:25

## 2020-01-01 RX ADMIN — NOREPINEPHRINE-DEXTROSE IV SOLUTION 4 MG/250ML-5% 4 MCG/MIN: 4-5/250 SOLUTION at 02:05

## 2020-01-01 RX ADMIN — MORPHINE SULFATE 4 MG: 4 INJECTION INTRAVENOUS at 11:04

## 2020-01-01 RX ADMIN — MORPHINE SULFATE 2 MG: 2 INJECTION, SOLUTION INTRAMUSCULAR; INTRAVENOUS at 01:08

## 2020-01-01 RX ADMIN — SENNOSIDES AND DOCUSATE SODIUM 1 TABLET: 8.6; 5 TABLET ORAL at 08:39

## 2020-01-01 RX ADMIN — HYDROCODONE BITARTRATE AND ACETAMINOPHEN 1 TABLET: 5; 325 TABLET ORAL at 00:09

## 2020-01-01 RX ADMIN — FUROSEMIDE 80 MG: 10 INJECTION, SOLUTION INTRAMUSCULAR; INTRAVENOUS at 08:47

## 2020-01-01 RX ADMIN — INSULIN LISPRO 4 UNITS: 100 INJECTION, SOLUTION INTRAVENOUS; SUBCUTANEOUS at 16:18

## 2020-01-01 RX ADMIN — TIROFIBAN 1875 MCG: 3.75 INJECTION, SOLUTION INTRAVENOUS at 11:42

## 2020-01-01 RX ADMIN — Medication 10 ML: at 14:24

## 2020-01-01 RX ADMIN — Medication 10 ML: at 14:13

## 2020-01-01 RX ADMIN — MORPHINE SULFATE 2 MG: 2 INJECTION, SOLUTION INTRAMUSCULAR; INTRAVENOUS at 03:44

## 2020-01-01 RX ADMIN — TICAGRELOR 90 MG: 90 TABLET ORAL at 05:59

## 2020-01-01 RX ADMIN — Medication 20 ML: at 14:09

## 2020-01-01 RX ADMIN — TICAGRELOR 90 MG: 90 TABLET ORAL at 05:46

## 2020-01-01 RX ADMIN — FENTANYL CITRATE 50 MCG: 50 INJECTION INTRAMUSCULAR; INTRAVENOUS at 12:00

## 2020-01-01 RX ADMIN — CEFTRIAXONE SODIUM 1 G: 1 INJECTION, POWDER, FOR SOLUTION INTRAMUSCULAR; INTRAVENOUS at 18:29

## 2020-01-01 RX ADMIN — LORAZEPAM 1 MG: 2 INJECTION INTRAMUSCULAR; INTRAVENOUS at 23:40

## 2020-01-01 RX ADMIN — HEPARIN SODIUM 10.5 ML/HR: 1000 INJECTION INTRAVENOUS; SUBCUTANEOUS at 15:05

## 2020-01-01 RX ADMIN — Medication 10 ML: at 05:35

## 2020-01-01 RX ADMIN — LORAZEPAM 1 MG: 2 INJECTION INTRAMUSCULAR; INTRAVENOUS at 23:46

## 2020-01-01 RX ADMIN — HEPARIN SODIUM 2 ML: 10000 INJECTION, SOLUTION INTRAVENOUS; SUBCUTANEOUS at 11:30

## 2020-01-01 RX ADMIN — CARVEDILOL 3.12 MG: 3.12 TABLET, FILM COATED ORAL at 11:18

## 2020-01-01 RX ADMIN — HYDROMORPHONE HYDROCHLORIDE 1 MG: 1 INJECTION, SOLUTION INTRAMUSCULAR; INTRAVENOUS; SUBCUTANEOUS at 18:28

## 2020-01-01 RX ADMIN — Medication 10 ML: at 05:39

## 2020-01-01 RX ADMIN — MORPHINE SULFATE 2 MG: 2 INJECTION, SOLUTION INTRAMUSCULAR; INTRAVENOUS at 20:55

## 2020-01-01 RX ADMIN — INSULIN LISPRO 10 UNITS: 100 INJECTION, SOLUTION INTRAVENOUS; SUBCUTANEOUS at 11:18

## 2020-01-01 RX ADMIN — INSULIN GLARGINE 10 UNITS: 100 INJECTION, SOLUTION SUBCUTANEOUS at 18:42

## 2020-01-01 RX ADMIN — INSULIN HUMAN 22 UNITS: 100 INJECTION, SUSPENSION SUBCUTANEOUS at 08:46

## 2020-01-01 RX ADMIN — ASPIRIN 81 MG: 81 TABLET, CHEWABLE ORAL at 08:11

## 2020-01-01 RX ADMIN — INSULIN LISPRO 2 UNITS: 100 INJECTION, SOLUTION INTRAVENOUS; SUBCUTANEOUS at 08:11

## 2020-01-01 RX ADMIN — HYDROMORPHONE HYDROCHLORIDE 1 MG: 1 INJECTION, SOLUTION INTRAMUSCULAR; INTRAVENOUS; SUBCUTANEOUS at 22:50

## 2020-01-01 RX ADMIN — INSULIN LISPRO 9 UNITS: 100 INJECTION, SOLUTION INTRAVENOUS; SUBCUTANEOUS at 16:43

## 2020-01-01 RX ADMIN — CARVEDILOL 3.12 MG: 3.12 TABLET, FILM COATED ORAL at 16:18

## 2020-01-01 RX ADMIN — ATORVASTATIN CALCIUM 80 MG: 80 TABLET, FILM COATED ORAL at 08:41

## 2020-01-01 RX ADMIN — LORAZEPAM 1 MG: 2 INJECTION INTRAMUSCULAR; INTRAVENOUS at 20:35

## 2020-01-01 RX ADMIN — MORPHINE SULFATE 2 MG: 2 INJECTION, SOLUTION INTRAMUSCULAR; INTRAVENOUS at 16:19

## 2020-01-01 RX ADMIN — HEPARIN SODIUM 4000 UNITS: 5000 INJECTION INTRAVENOUS; SUBCUTANEOUS at 11:01

## 2020-01-01 RX ADMIN — DEXMEDETOMIDINE HYDROCHLORIDE 0.5 MCG/KG/HR: 100 INJECTION, SOLUTION INTRAVENOUS at 01:48

## 2020-01-01 RX ADMIN — FUROSEMIDE 40 MG: 10 INJECTION, SOLUTION INTRAMUSCULAR; INTRAVENOUS at 08:29

## 2020-01-01 RX ADMIN — ATORVASTATIN CALCIUM 80 MG: 80 TABLET, FILM COATED ORAL at 08:24

## 2020-01-01 RX ADMIN — INSULIN GLARGINE 20 UNITS: 100 INJECTION, SOLUTION SUBCUTANEOUS at 08:34

## 2020-01-01 RX ADMIN — HYDRALAZINE HYDROCHLORIDE 50 MG: 50 TABLET, FILM COATED ORAL at 08:29

## 2020-01-01 RX ADMIN — LORAZEPAM 1 MG: 2 INJECTION INTRAMUSCULAR; INTRAVENOUS at 10:06

## 2020-01-01 RX ADMIN — MORPHINE SULFATE 2 MG: 2 INJECTION, SOLUTION INTRAMUSCULAR; INTRAVENOUS at 00:09

## 2020-01-01 RX ADMIN — TICAGRELOR 90 MG: 90 TABLET ORAL at 17:15

## 2020-01-01 RX ADMIN — AMIODARONE HYDROCHLORIDE 0.5 MG/MIN: 50 INJECTION, SOLUTION INTRAVENOUS at 19:11

## 2020-01-01 RX ADMIN — HEPARIN SODIUM 9 UNITS/KG/HR: 5000 INJECTION, SOLUTION INTRAVENOUS at 16:57

## 2020-01-01 RX ADMIN — INSULIN LISPRO 2 UNITS: 100 INJECTION, SOLUTION INTRAVENOUS; SUBCUTANEOUS at 22:00

## 2020-01-01 RX ADMIN — TICAGRELOR 90 MG: 90 TABLET ORAL at 06:21

## 2020-01-01 RX ADMIN — TICAGRELOR 90 MG: 90 TABLET ORAL at 05:06

## 2020-01-01 RX ADMIN — HEPARIN SODIUM 7.7 ML/HR: 1000 INJECTION INTRAVENOUS; SUBCUTANEOUS at 20:02

## 2020-01-01 RX ADMIN — TICAGRELOR 90 MG: 90 TABLET ORAL at 06:17

## 2020-01-01 RX ADMIN — INSULIN LISPRO 5 UNITS: 100 INJECTION, SOLUTION INTRAVENOUS; SUBCUTANEOUS at 13:12

## 2020-01-01 RX ADMIN — ASPIRIN 81 MG: 81 TABLET, CHEWABLE ORAL at 08:24

## 2020-01-01 RX ADMIN — INSULIN LISPRO 9 UNITS: 100 INJECTION, SOLUTION INTRAVENOUS; SUBCUTANEOUS at 13:22

## 2020-01-01 RX ADMIN — VANCOMYCIN HYDROCHLORIDE 1750 MG: 10 INJECTION, POWDER, LYOPHILIZED, FOR SOLUTION INTRAVENOUS at 10:23

## 2020-01-01 RX ADMIN — IOPAMIDOL 175 ML: 755 INJECTION, SOLUTION INTRAVENOUS at 13:23

## 2020-01-01 RX ADMIN — MORPHINE SULFATE 2 MG: 2 INJECTION, SOLUTION INTRAMUSCULAR; INTRAVENOUS at 09:54

## 2020-01-01 RX ADMIN — Medication 10 ML: at 15:22

## 2020-01-01 RX ADMIN — HEPARIN SODIUM 3 UNITS/HR: 200 INJECTION, SOLUTION INTRAVENOUS at 11:25

## 2020-01-01 RX ADMIN — ATROPINE SULFATE 0.5 MG: 0.4 INJECTION, SOLUTION INTRAMUSCULAR; INTRAVENOUS; SUBCUTANEOUS at 02:00

## 2020-01-01 RX ADMIN — ONDANSETRON 4 MG: 2 INJECTION INTRAMUSCULAR; INTRAVENOUS at 17:27

## 2020-01-01 RX ADMIN — FAMOTIDINE 20 MG: 10 INJECTION INTRAVENOUS at 21:38

## 2020-01-01 RX ADMIN — LORAZEPAM 1 MG: 2 INJECTION INTRAMUSCULAR; INTRAVENOUS at 01:02

## 2020-01-01 RX ADMIN — NOREPINEPHRINE-DEXTROSE IV SOLUTION 4 MG/250ML-5% 16 MCG/MIN: 4-5/250 SOLUTION at 23:18

## 2020-01-01 RX ADMIN — ASPIRIN 81 MG: 81 TABLET, CHEWABLE ORAL at 09:21

## 2020-01-01 RX ADMIN — TICAGRELOR 90 MG: 90 TABLET ORAL at 17:07

## 2020-01-01 RX ADMIN — TICAGRELOR 90 MG: 90 TABLET ORAL at 17:02

## 2020-01-01 RX ADMIN — HYDROCODONE BITARTRATE AND ACETAMINOPHEN 1 TABLET: 5; 325 TABLET ORAL at 00:07

## 2020-01-01 RX ADMIN — WARFARIN SODIUM 5 MG: 5 TABLET ORAL at 18:05

## 2020-01-01 RX ADMIN — DIATRIZOATE MEGLUMINE AND DIATRIZOATE SODIUM 15 ML: 660; 100 LIQUID ORAL; RECTAL at 10:03

## 2020-01-01 RX ADMIN — HEPARIN SODIUM 5000 UNITS: 1000 INJECTION INTRAVENOUS; SUBCUTANEOUS at 12:29

## 2020-01-01 RX ADMIN — ONDANSETRON 4 MG: 2 INJECTION INTRAMUSCULAR; INTRAVENOUS at 11:04

## 2020-01-01 RX ADMIN — HYDRALAZINE HYDROCHLORIDE 25 MG: 25 TABLET, FILM COATED ORAL at 13:11

## 2020-01-01 RX ADMIN — HUMAN INSULIN 6 UNITS: 100 INJECTION, SOLUTION SUBCUTANEOUS at 00:05

## 2020-01-01 RX ADMIN — Medication 10 ML: at 22:10

## 2020-01-01 RX ADMIN — CARVEDILOL 3.12 MG: 3.12 TABLET, FILM COATED ORAL at 08:47

## 2020-01-01 RX ADMIN — Medication 10 ML: at 06:00

## 2020-01-01 RX ADMIN — MORPHINE SULFATE 2 MG: 2 INJECTION, SOLUTION INTRAMUSCULAR; INTRAVENOUS at 01:49

## 2020-01-01 RX ADMIN — ASPIRIN 81 MG 324 MG: 81 TABLET ORAL at 10:59

## 2020-01-01 RX ADMIN — ATORVASTATIN CALCIUM 80 MG: 80 TABLET, FILM COATED ORAL at 08:39

## 2020-01-01 RX ADMIN — FAMOTIDINE 20 MG: 10 INJECTION INTRAVENOUS at 12:25

## 2020-01-01 RX ADMIN — HYDRALAZINE HYDROCHLORIDE 50 MG: 50 TABLET, FILM COATED ORAL at 14:19

## 2020-01-01 RX ADMIN — INSULIN LISPRO 10 UNITS: 100 INJECTION, SOLUTION INTRAVENOUS; SUBCUTANEOUS at 16:50

## 2020-01-01 RX ADMIN — LORAZEPAM 1 MG: 2 INJECTION INTRAMUSCULAR; INTRAVENOUS at 23:29

## 2020-01-01 RX ADMIN — ASPIRIN 81 MG: 81 TABLET, CHEWABLE ORAL at 08:10

## 2020-01-01 RX ADMIN — NOREPINEPHRINE-DEXTROSE IV SOLUTION 4 MG/250ML-5% 11 MCG/MIN: 4-5/250 SOLUTION at 05:52

## 2020-01-01 RX ADMIN — INSULIN LISPRO 12 UNITS: 100 INJECTION, SOLUTION INTRAVENOUS; SUBCUTANEOUS at 12:11

## 2020-01-01 RX ADMIN — POTASSIUM CHLORIDE 20 MEQ: 200 INJECTION, SOLUTION INTRAVENOUS at 16:25

## 2020-01-01 RX ADMIN — HEPARIN SODIUM 9 UNITS/KG/HR: 5000 INJECTION, SOLUTION INTRAVENOUS at 00:26

## 2020-01-01 RX ADMIN — HEPARIN SODIUM 5000 UNITS: 5000 INJECTION INTRAVENOUS; SUBCUTANEOUS at 18:39

## 2020-01-01 RX ADMIN — ASPIRIN 81 MG: 81 TABLET, CHEWABLE ORAL at 08:39

## 2020-01-01 RX ADMIN — ASPIRIN 81 MG: 81 TABLET, CHEWABLE ORAL at 08:46

## 2020-01-01 RX ADMIN — CEFTRIAXONE SODIUM 1 G: 1 INJECTION, POWDER, FOR SOLUTION INTRAMUSCULAR; INTRAVENOUS at 19:50

## 2020-01-01 RX ADMIN — Medication 10 ML: at 22:29

## 2020-01-01 RX ADMIN — HEPARIN SODIUM 5000 UNITS: 1000 INJECTION INTRAVENOUS; SUBCUTANEOUS at 13:29

## 2020-01-01 RX ADMIN — Medication 10 ML: at 22:41

## 2020-01-01 RX ADMIN — PERFLUTREN 1 ML: 6.52 INJECTION, SUSPENSION INTRAVENOUS at 14:00

## 2020-01-01 RX ADMIN — MORPHINE SULFATE 2 MG: 2 INJECTION, SOLUTION INTRAMUSCULAR; INTRAVENOUS at 23:46

## 2020-01-01 RX ADMIN — MORPHINE SULFATE 2 MG: 2 INJECTION, SOLUTION INTRAMUSCULAR; INTRAVENOUS at 15:21

## 2020-01-01 RX ADMIN — HYDRALAZINE HYDROCHLORIDE 25 MG: 25 TABLET, FILM COATED ORAL at 16:25

## 2020-01-01 RX ADMIN — HEPARIN SODIUM 12 UNITS/KG/HR: 5000 INJECTION, SOLUTION INTRAVENOUS at 17:10

## 2020-01-01 RX ADMIN — AMIODARONE HYDROCHLORIDE 1 MG/MIN: 50 INJECTION, SOLUTION INTRAVENOUS at 11:48

## 2020-01-01 RX ADMIN — Medication 10 ML: at 06:22

## 2020-01-01 RX ADMIN — INSULIN GLARGINE 10 UNITS: 100 INJECTION, SOLUTION SUBCUTANEOUS at 09:04

## 2020-01-01 RX ADMIN — DEXMEDETOMIDINE HYDROCHLORIDE 0.5 MCG/KG/HR: 100 INJECTION, SOLUTION INTRAVENOUS at 17:51

## 2020-01-01 RX ADMIN — INSULIN LISPRO 4 UNITS: 100 INJECTION, SOLUTION INTRAVENOUS; SUBCUTANEOUS at 08:48

## 2020-01-01 RX ADMIN — CEFTRIAXONE SODIUM 1 G: 1 INJECTION, POWDER, FOR SOLUTION INTRAMUSCULAR; INTRAVENOUS at 18:11

## 2020-01-01 RX ADMIN — Medication 10 ML: at 19:51

## 2020-01-01 RX ADMIN — INSULIN LISPRO 10 UNITS: 100 INJECTION, SOLUTION INTRAVENOUS; SUBCUTANEOUS at 21:45

## 2020-01-01 RX ADMIN — SENNOSIDES AND DOCUSATE SODIUM 1 TABLET: 8.6; 5 TABLET ORAL at 09:04

## 2020-01-01 RX ADMIN — TICAGRELOR 90 MG: 90 TABLET ORAL at 18:30

## 2020-01-01 RX ADMIN — SENNOSIDES AND DOCUSATE SODIUM 1 TABLET: 8.6; 5 TABLET ORAL at 08:24

## 2020-01-01 RX ADMIN — INSULIN LISPRO 8 UNITS: 100 INJECTION, SOLUTION INTRAVENOUS; SUBCUTANEOUS at 08:23

## 2020-01-01 RX ADMIN — LORAZEPAM 1 MG: 2 INJECTION INTRAMUSCULAR; INTRAVENOUS at 06:26

## 2020-01-01 RX ADMIN — Medication 10 ML: at 05:07

## 2020-01-01 RX ADMIN — TICAGRELOR 90 MG: 90 TABLET ORAL at 17:35

## 2020-01-01 RX ADMIN — DEXMEDETOMIDINE HYDROCHLORIDE 0.4 MCG/KG/HR: 100 INJECTION, SOLUTION INTRAVENOUS at 13:23

## 2020-01-01 RX ADMIN — MORPHINE SULFATE 2 MG: 2 INJECTION, SOLUTION INTRAMUSCULAR; INTRAVENOUS at 20:51

## 2020-01-01 RX ADMIN — ALUMINUM HYDROXIDE, MAGNESIUM HYDROXIDE, AND SIMETHICONE 30 ML: 200; 200; 20 SUSPENSION ORAL at 05:59

## 2020-01-01 RX ADMIN — INSULIN LISPRO 10 UNITS: 100 INJECTION, SOLUTION INTRAVENOUS; SUBCUTANEOUS at 22:00

## 2020-01-01 RX ADMIN — HUMAN INSULIN 4 UNITS: 100 INJECTION, SOLUTION SUBCUTANEOUS at 16:57

## 2020-01-01 RX ADMIN — ATORVASTATIN CALCIUM 80 MG: 80 TABLET, FILM COATED ORAL at 09:21

## 2020-01-01 RX ADMIN — NITROGLYCERIN 0.2 MG: 20 INJECTION INTRAVENOUS at 11:57

## 2020-01-01 RX ADMIN — HYDRALAZINE HYDROCHLORIDE 50 MG: 50 TABLET, FILM COATED ORAL at 22:44

## 2020-01-01 RX ADMIN — INSULIN LISPRO 6 UNITS: 100 INJECTION, SOLUTION INTRAVENOUS; SUBCUTANEOUS at 12:05

## 2020-01-01 RX ADMIN — HYDROCODONE BITARTRATE AND ACETAMINOPHEN 1 TABLET: 5; 325 TABLET ORAL at 20:35

## 2020-01-01 RX ADMIN — INSULIN LISPRO 9 UNITS: 100 INJECTION, SOLUTION INTRAVENOUS; SUBCUTANEOUS at 22:00

## 2020-01-01 RX ADMIN — ALUMINUM HYDROXIDE, MAGNESIUM HYDROXIDE, AND SIMETHICONE 30 ML: 200; 200; 20 SUSPENSION ORAL at 15:01

## 2020-01-01 RX ADMIN — INSULIN LISPRO 6 UNITS: 100 INJECTION, SOLUTION INTRAVENOUS; SUBCUTANEOUS at 08:29

## 2020-01-01 RX ADMIN — CARVEDILOL 3.12 MG: 3.12 TABLET, FILM COATED ORAL at 08:29

## 2020-01-01 RX ADMIN — Medication 10 ML: at 13:12

## 2020-01-01 RX ADMIN — INSULIN GLARGINE 10 UNITS: 100 INJECTION, SOLUTION SUBCUTANEOUS at 17:36

## 2020-01-01 RX ADMIN — DEXMEDETOMIDINE HYDROCHLORIDE 0.5 MCG/KG/HR: 100 INJECTION, SOLUTION INTRAVENOUS at 00:34

## 2020-01-01 RX ADMIN — FUROSEMIDE 80 MG: 10 INJECTION, SOLUTION INTRAMUSCULAR; INTRAVENOUS at 21:56

## 2020-01-01 RX ADMIN — HEPARIN SODIUM AND DEXTROSE 12 UNITS/KG/HR: 5000; 5 INJECTION INTRAVENOUS at 18:40

## 2020-01-01 RX ADMIN — ASPIRIN 81 MG: 81 TABLET, CHEWABLE ORAL at 08:41

## 2020-01-01 RX ADMIN — INSULIN HUMAN 22 UNITS: 100 INJECTION, SUSPENSION SUBCUTANEOUS at 09:14

## 2020-01-01 RX ADMIN — NOREPINEPHRINE-DEXTROSE IV SOLUTION 4 MG/250ML-5% 5 MCG/MIN: 4-5/250 SOLUTION at 19:09

## 2020-01-01 RX ADMIN — INSULIN GLARGINE 33 UNITS: 100 INJECTION, SOLUTION SUBCUTANEOUS at 08:41

## 2020-01-01 RX ADMIN — ALUMINUM HYDROXIDE, MAGNESIUM HYDROXIDE, AND SIMETHICONE 30 ML: 200; 200; 20 SUSPENSION ORAL at 19:51

## 2020-01-01 RX ADMIN — NOREPINEPHRINE-DEXTROSE IV SOLUTION 4 MG/250ML-5% 11 MCG/MIN: 4-5/250 SOLUTION at 12:02

## 2020-01-01 RX ADMIN — MORPHINE SULFATE 2 MG: 2 INJECTION, SOLUTION INTRAMUSCULAR; INTRAVENOUS at 12:30

## 2020-01-01 RX ADMIN — TICAGRELOR 90 MG: 90 TABLET ORAL at 05:35

## 2020-01-01 RX ADMIN — HUMAN INSULIN 10 UNITS: 100 INJECTION, SOLUTION SUBCUTANEOUS at 09:14

## 2020-01-01 RX ADMIN — LORAZEPAM 1 MG: 2 INJECTION INTRAMUSCULAR; INTRAVENOUS at 00:51

## 2020-01-01 RX ADMIN — TICAGRELOR 90 MG: 90 TABLET ORAL at 17:33

## 2020-01-01 RX ADMIN — TICAGRELOR 90 MG: 90 TABLET ORAL at 08:50

## 2020-01-01 RX ADMIN — GLYBURIDE 2.5 MG: 2.5 TABLET ORAL at 07:47

## 2020-01-01 RX ADMIN — Medication 10 ML: at 21:45

## 2020-01-01 RX ADMIN — HEPARIN SODIUM 10.3 ML/HR: 1000 INJECTION INTRAVENOUS; SUBCUTANEOUS at 14:23

## 2020-01-01 RX ADMIN — NOREPINEPHRINE-DEXTROSE IV SOLUTION 4 MG/250ML-5% 16 MCG/MIN: 4-5/250 SOLUTION at 16:15

## 2020-01-01 RX ADMIN — HUMAN INSULIN 4 UNITS: 100 INJECTION, SOLUTION SUBCUTANEOUS at 20:00

## 2020-01-01 RX ADMIN — AMIODARONE HYDROCHLORIDE 0.5 MG/MIN: 50 INJECTION, SOLUTION INTRAVENOUS at 11:56

## 2020-01-01 RX ADMIN — SENNOSIDES AND DOCUSATE SODIUM 1 TABLET: 8.6; 5 TABLET ORAL at 09:25

## 2020-01-01 RX ADMIN — CEFTRIAXONE SODIUM 1 G: 1 INJECTION, POWDER, FOR SOLUTION INTRAMUSCULAR; INTRAVENOUS at 19:35

## 2020-01-01 RX ADMIN — INSULIN GLARGINE 35 UNITS: 100 INJECTION, SOLUTION SUBCUTANEOUS at 18:30

## 2020-01-01 RX ADMIN — AMIODARONE HYDROCHLORIDE 0.5 MG/MIN: 50 INJECTION, SOLUTION INTRAVENOUS at 04:48

## 2020-01-01 RX ADMIN — ASPIRIN 81 MG: 81 TABLET, CHEWABLE ORAL at 08:29

## 2020-01-01 RX ADMIN — Medication 20 ML: at 21:25

## 2020-01-01 RX ADMIN — Medication 10 ML: at 22:44

## 2020-01-01 RX ADMIN — Medication 10 ML: at 22:00

## 2020-01-01 RX ADMIN — HUMAN INSULIN 6 UNITS: 100 INJECTION, SOLUTION SUBCUTANEOUS at 00:00

## 2020-01-01 RX ADMIN — FENTANYL CITRATE 50 MCG: 50 INJECTION INTRAMUSCULAR; INTRAVENOUS at 11:54

## 2020-01-01 RX ADMIN — NITROGLYCERIN 5 MCG/MIN: 20 INJECTION INTRAVENOUS at 11:01

## 2020-01-01 RX ADMIN — ACETAMINOPHEN 650 MG: 325 TABLET, FILM COATED ORAL at 03:11

## 2020-01-01 RX ADMIN — INSULIN GLARGINE 20 UNITS: 100 INJECTION, SOLUTION SUBCUTANEOUS at 08:29

## 2020-01-01 RX ADMIN — HUMAN INSULIN 8 UNITS: 100 INJECTION, SOLUTION SUBCUTANEOUS at 08:46

## 2020-01-01 RX ADMIN — ATORVASTATIN CALCIUM 80 MG: 80 TABLET, FILM COATED ORAL at 11:18

## 2020-01-01 RX ADMIN — WARFARIN SODIUM 5 MG: 5 TABLET ORAL at 18:16

## 2020-01-01 RX ADMIN — HYDROMORPHONE HYDROCHLORIDE 1 MG: 1 INJECTION, SOLUTION INTRAMUSCULAR; INTRAVENOUS; SUBCUTANEOUS at 10:24

## 2020-01-01 RX ADMIN — CEFTRIAXONE SODIUM 1 G: 1 INJECTION, POWDER, FOR SOLUTION INTRAMUSCULAR; INTRAVENOUS at 18:06

## 2020-01-01 RX ADMIN — MORPHINE SULFATE 2 MG: 2 INJECTION, SOLUTION INTRAMUSCULAR; INTRAVENOUS at 06:26

## 2020-01-01 RX ADMIN — LORAZEPAM 1 MG: 2 INJECTION INTRAMUSCULAR; INTRAVENOUS at 02:50

## 2020-01-01 RX ADMIN — TICAGRELOR 90 MG: 90 TABLET ORAL at 18:16

## 2020-01-01 RX ADMIN — DEXMEDETOMIDINE HYDROCHLORIDE 0.5 MCG/KG/HR: 100 INJECTION, SOLUTION INTRAVENOUS at 05:13

## 2020-01-01 RX ADMIN — MIDAZOLAM 1 MG: 1 INJECTION INTRAMUSCULAR; INTRAVENOUS at 09:46

## 2020-01-01 RX ADMIN — EPINEPHRINE 1 MG: 0.1 INJECTION, SOLUTION ENDOTRACHEAL; INTRACARDIAC; INTRAVENOUS at 01:49

## 2020-01-01 RX ADMIN — HYDROMORPHONE HYDROCHLORIDE 0.5 MG: 1 INJECTION, SOLUTION INTRAMUSCULAR; INTRAVENOUS; SUBCUTANEOUS at 09:25

## 2020-01-01 RX ADMIN — NITROGLYCERIN 10 MCG/MIN: 200 INJECTION, SOLUTION INTRAVENOUS at 13:05

## 2020-01-01 RX ADMIN — TICAGRELOR 90 MG: 90 TABLET ORAL at 06:00

## 2020-01-01 RX ADMIN — HEPARIN SODIUM 4000 UNITS: 10000 INJECTION INTRAVENOUS; SUBCUTANEOUS at 11:34

## 2020-01-01 RX ADMIN — TICAGRELOR 90 MG: 90 TABLET ORAL at 05:39

## 2020-01-01 RX ADMIN — LORAZEPAM 1 MG: 2 INJECTION INTRAMUSCULAR; INTRAVENOUS at 04:30

## 2020-01-01 RX ADMIN — LORAZEPAM 1 MG: 2 INJECTION INTRAMUSCULAR; INTRAVENOUS at 18:24

## 2020-01-01 RX ADMIN — DEXMEDETOMIDINE HYDROCHLORIDE 0.5 MCG/KG/HR: 100 INJECTION, SOLUTION INTRAVENOUS at 12:04

## 2020-01-01 RX ADMIN — LORAZEPAM 1 MG: 2 INJECTION INTRAMUSCULAR; INTRAVENOUS at 17:37

## 2020-01-01 RX ADMIN — LORAZEPAM 1 MG: 2 INJECTION INTRAMUSCULAR; INTRAVENOUS at 06:56

## 2020-01-01 RX ADMIN — DEXMEDETOMIDINE HYDROCHLORIDE 0.5 MCG/KG/HR: 100 INJECTION, SOLUTION INTRAVENOUS at 08:05

## 2020-01-01 RX ADMIN — TIROFIBAN 0.07 MCG/KG/MIN: 5 INJECTION, SOLUTION INTRAVENOUS at 11:46

## 2020-01-01 RX ADMIN — INSULIN HUMAN 22 UNITS: 100 INJECTION, SUSPENSION SUBCUTANEOUS at 21:35

## 2020-01-01 RX ADMIN — LORAZEPAM 1 MG: 2 INJECTION INTRAMUSCULAR; INTRAVENOUS at 20:09

## 2020-01-01 RX ADMIN — SENNOSIDES AND DOCUSATE SODIUM 1 TABLET: 8.6; 5 TABLET ORAL at 09:51

## 2020-01-01 RX ADMIN — HUMAN INSULIN 2 UNITS: 100 INJECTION, SOLUTION SUBCUTANEOUS at 21:37

## 2020-01-01 RX ADMIN — FUROSEMIDE 40 MG: 10 INJECTION, SOLUTION INTRAVENOUS at 10:46

## 2020-01-01 RX ADMIN — ASPIRIN 81 MG: 81 TABLET, CHEWABLE ORAL at 08:26

## 2020-01-01 RX ADMIN — LORAZEPAM 1 MG: 2 INJECTION INTRAMUSCULAR; INTRAVENOUS at 01:35

## 2020-01-01 RX ADMIN — TICAGRELOR 90 MG: 90 TABLET ORAL at 17:36

## 2020-01-01 RX ADMIN — DEXMEDETOMIDINE HYDROCHLORIDE 0.4 MCG/KG/HR: 100 INJECTION, SOLUTION INTRAVENOUS at 23:41

## 2020-01-01 RX ADMIN — LORAZEPAM 1 MG: 2 INJECTION INTRAMUSCULAR; INTRAVENOUS at 21:41

## 2020-01-01 RX ADMIN — NITROGLYCERIN 20 MCG/MIN: 200 INJECTION, SOLUTION INTRAVENOUS at 15:04

## 2020-01-01 RX ADMIN — MIDAZOLAM 1 MG: 1 INJECTION INTRAMUSCULAR; INTRAVENOUS at 11:39

## 2020-01-01 RX ADMIN — MORPHINE SULFATE 2 MG: 2 INJECTION, SOLUTION INTRAMUSCULAR; INTRAVENOUS at 02:05

## 2020-01-01 RX ADMIN — MORPHINE SULFATE 2 MG: 2 INJECTION, SOLUTION INTRAMUSCULAR; INTRAVENOUS at 04:29

## 2020-01-01 RX ADMIN — HYDROMORPHONE HYDROCHLORIDE 1 MG: 1 INJECTION, SOLUTION INTRAMUSCULAR; INTRAVENOUS; SUBCUTANEOUS at 20:35

## 2020-01-01 RX ADMIN — DEXMEDETOMIDINE HYDROCHLORIDE 0.4 MCG/KG/HR: 100 INJECTION, SOLUTION INTRAVENOUS at 11:41

## 2020-01-01 RX ADMIN — MORPHINE SULFATE 2 MG: 2 INJECTION, SOLUTION INTRAMUSCULAR; INTRAVENOUS at 22:40

## 2020-01-01 RX ADMIN — ACETAMINOPHEN 650 MG: 325 TABLET, FILM COATED ORAL at 06:36

## 2020-01-01 RX ADMIN — FUROSEMIDE 40 MG: 10 INJECTION, SOLUTION INTRAMUSCULAR; INTRAVENOUS at 15:09

## 2020-01-01 RX ADMIN — BISACODYL 10 MG: 10 SUPPOSITORY RECTAL at 15:59

## 2020-01-01 RX ADMIN — HEPARIN SODIUM 9.4 ML/HR: 1000 INJECTION INTRAVENOUS; SUBCUTANEOUS at 19:12

## 2020-01-01 RX ADMIN — ATORVASTATIN CALCIUM 80 MG: 80 TABLET, FILM COATED ORAL at 08:11

## 2020-01-01 RX ADMIN — LORAZEPAM 1 MG: 2 INJECTION INTRAMUSCULAR; INTRAVENOUS at 03:44

## 2020-01-01 RX ADMIN — HYDROMORPHONE HYDROCHLORIDE 1 MG: 1 INJECTION, SOLUTION INTRAMUSCULAR; INTRAVENOUS; SUBCUTANEOUS at 01:00

## 2020-01-01 RX ADMIN — INSULIN LISPRO 12 UNITS: 100 INJECTION, SOLUTION INTRAVENOUS; SUBCUTANEOUS at 07:21

## 2020-01-01 RX ADMIN — MORPHINE SULFATE 2 MG: 2 INJECTION, SOLUTION INTRAMUSCULAR; INTRAVENOUS at 14:22

## 2020-01-01 RX ADMIN — SODIUM CHLORIDE 75 ML/HR: 900 INJECTION, SOLUTION INTRAVENOUS at 16:57

## 2020-01-01 RX ADMIN — INSULIN LISPRO 2 UNITS: 100 INJECTION, SOLUTION INTRAVENOUS; SUBCUTANEOUS at 12:38

## 2020-01-01 RX ADMIN — Medication 10 ML: at 05:59

## 2020-01-01 RX ADMIN — LORAZEPAM 1 MG: 2 INJECTION INTRAMUSCULAR; INTRAVENOUS at 11:14

## 2020-01-01 RX ADMIN — INSULIN GLARGINE 30 UNITS: 100 INJECTION, SOLUTION SUBCUTANEOUS at 08:39

## 2020-01-01 RX ADMIN — DEXMEDETOMIDINE HYDROCHLORIDE 0.4 MCG/KG/HR: 100 INJECTION, SOLUTION INTRAVENOUS at 08:00

## 2020-01-01 RX ADMIN — HYDRALAZINE HYDROCHLORIDE 25 MG: 25 TABLET, FILM COATED ORAL at 08:10

## 2020-01-01 RX ADMIN — INSULIN LISPRO 4 UNITS: 100 INJECTION, SOLUTION INTRAVENOUS; SUBCUTANEOUS at 21:56

## 2020-01-01 RX ADMIN — SENNOSIDES AND DOCUSATE SODIUM 1 TABLET: 8.6; 5 TABLET ORAL at 08:41

## 2020-01-01 RX ADMIN — HUMAN INSULIN 4 UNITS: 100 INJECTION, SOLUTION SUBCUTANEOUS at 16:54

## 2020-01-01 RX ADMIN — ATORVASTATIN CALCIUM 80 MG: 80 TABLET, FILM COATED ORAL at 08:29

## 2020-01-01 RX ADMIN — CEFEPIME HYDROCHLORIDE 2 G: 2 INJECTION, POWDER, FOR SOLUTION INTRAVENOUS at 09:04

## 2020-01-01 RX ADMIN — MORPHINE SULFATE 2 MG: 2 INJECTION, SOLUTION INTRAMUSCULAR; INTRAVENOUS at 22:27

## 2020-01-01 RX ADMIN — Medication 10 ML: at 06:27

## 2020-01-01 RX ADMIN — NOREPINEPHRINE BITARTRATE 5 MCG/MIN: 1 INJECTION, SOLUTION, CONCENTRATE INTRAVENOUS at 01:27

## 2020-01-01 RX ADMIN — ONDANSETRON 4 MG: 2 INJECTION INTRAMUSCULAR; INTRAVENOUS at 07:45

## 2020-01-01 RX ADMIN — TICAGRELOR 90 MG: 90 TABLET ORAL at 05:29

## 2020-01-01 RX ADMIN — INSULIN HUMAN 18 UNITS: 100 INJECTION, SUSPENSION SUBCUTANEOUS at 20:05

## 2020-01-01 RX ADMIN — ACETAMINOPHEN 650 MG: 325 TABLET, FILM COATED ORAL at 22:48

## 2020-01-01 RX ADMIN — INSULIN LISPRO 4 UNITS: 100 INJECTION, SOLUTION INTRAVENOUS; SUBCUTANEOUS at 22:48

## 2020-01-01 RX ADMIN — Medication 10 ML: at 21:56

## 2020-01-01 RX ADMIN — ACETAMINOPHEN 650 MG: 325 TABLET, FILM COATED ORAL at 08:22

## 2020-01-01 RX ADMIN — HYDRALAZINE HYDROCHLORIDE 25 MG: 25 TABLET, FILM COATED ORAL at 21:44

## 2020-01-01 RX ADMIN — INSULIN LISPRO 5 UNITS: 100 INJECTION, SOLUTION INTRAVENOUS; SUBCUTANEOUS at 21:45

## 2020-01-01 RX ADMIN — FAMOTIDINE 20 MG: 10 INJECTION INTRAVENOUS at 10:07

## 2020-01-01 RX ADMIN — INSULIN LISPRO 10 UNITS: 100 INJECTION, SOLUTION INTRAVENOUS; SUBCUTANEOUS at 07:50

## 2020-01-01 RX ADMIN — INSULIN LISPRO 10 UNITS: 100 INJECTION, SOLUTION INTRAVENOUS; SUBCUTANEOUS at 07:30

## 2020-01-01 RX ADMIN — ATORVASTATIN CALCIUM 80 MG: 80 TABLET, FILM COATED ORAL at 09:51

## 2020-01-01 RX ADMIN — Medication 20 ML: at 05:46

## 2020-01-01 RX ADMIN — INSULIN LISPRO 4 UNITS: 100 INJECTION, SOLUTION INTRAVENOUS; SUBCUTANEOUS at 17:31

## 2020-01-01 RX ADMIN — ATORVASTATIN CALCIUM 80 MG: 80 TABLET, FILM COATED ORAL at 08:22

## 2020-09-22 PROBLEM — I21.3 STEMI (ST ELEVATION MYOCARDIAL INFARCTION) (HCC): Status: ACTIVE | Noted: 2020-01-01

## 2020-09-22 PROBLEM — I10 HYPERTENSION: Status: ACTIVE | Noted: 2020-01-01

## 2020-09-22 PROBLEM — I21.02 ACUTE ST ELEVATION MYOCARDIAL INFARCTION (STEMI) INVOLVING LEFT ANTERIOR DESCENDING (LAD) CORONARY ARTERY (HCC): Status: ACTIVE | Noted: 2020-01-01

## 2020-09-22 PROBLEM — E11.9 DIABETES MELLITUS TYPE 2, CONTROLLED (HCC): Status: ACTIVE | Noted: 2020-01-01

## 2020-09-22 PROBLEM — N30.00 ACUTE CYSTITIS WITHOUT HEMATURIA: Status: ACTIVE | Noted: 2020-01-01

## 2020-09-22 NOTE — PROGRESS NOTES
PA catheter with RV waveforms on monitor, Dr. Tejal Galdamez notified. Orders for both Aggrastat and Heparin. Dr. Tejal Galdamez notified for clarification.

## 2020-09-22 NOTE — H&P
[facility] CARDIOLOGY History &Physical 
            
 
Primary Cardiologist: None Primary Care Physician: unknown Admitting Physician: Dr Manuel Robins Subjective:  
 
Patient is a 79 y.o. male who presented to HOSPITAL [place] [address] OF [facility] with CP w EKG showing ST w anterior STEMI. H/O htn and DM. Pt transferred emergently downtown for [address]. Pt speaks only Thai and history obtained per chart and through . He began having severe chest pressure one hour earlier w SOB and diaphoresis, worse w exertion, pain severe. Pt given ASA, heparin and started on a nitro drip. Initial /99, decreased to 126/80 en route. Pt continuing to have severe pain, unable to quantify. NKDA. Unable to obtain any further HPI due to urgent need for LHC. Past Medical History:  
Diagnosis Date  Diabetes ([facility] [address]  Hypertension No past surgical history on file. No Known Allergies Social History Tobacco Use  Smoking status: Never Smoker  Smokeless tobacco: Never Used Substance Use Topics  Alcohol use: Not on file FH: Unable to obtain as pt going urgently for [address] [facility] Review of Systems General: no weight change, no weakness, fever or chills Skin: no rashes, lumps, or other skin changes HEENT: no headache, dizziness, lightheadedness, vision changes, hearing changes, tinnitus, vertigo, sinus pressure/pain, bleeding gums, sore throat, or hoarseness Neck: no swollen glands, goiter, pain or stiffness Respiratory: no cough, sputum, hemoptysis, + dyspnea, no wheezing Cardiovascular: + as per HPI Gastrointestinal: no reflux, constipation, diarrhea, liver problems, GI bleeding Urinary: no frequency, urgency , hematuria, burning/pain with urination, recent flank pain, polyuria, nocturia, or difficulty urinating Peripheral Vascular: no claudication, leg cramps, prior DVTs, swelling of calves, legs, or feet, color change, or swelling with redness or tenderness Musculoskeletal: no muscle or joint pain/stiffness, joint swelling, erythema of joints, or back pain Psychiatric: no depression or excessive stress Neurological: no sensory or motor loss, seizures, syncope, tremors, numbness, tingling, no changes in mood, attention, or speech, no changes in orientation, memory, insight, or judgment. Hematologic: no anemia, easy bruising or bleeding Endocrine: no thyroid problems, no heat or cold intolerance, excessive sweating, polyuria, polydipsia, + diabetes. Objective: There were no vitals taken for this visit. Vital per cath lab. No intake/output data recorded. No intake/output data recorded. Physical Exam: 
General: Well Developed, Well Nourished, No Acute Distress, appears younger than stated age HEENT: pupils equal and round, no abnormalities noted Neck: supple, no JVD, no carotid bruits Heart: S1S2 with RRR without murmurs or gallops Lungs: Clear throughout auscultation bilaterally without adventitious sounds Abd: soft, nontender, nondistended, with good bowel sounds Ext: warm, no edema, calves supple/nontender Skin: warm and dry Psychiatric: Normal mood and affect Neurologic: Alert and oriented X 3 ECG: ST w anterior STEMI Data Review:  
  
Recent Results (from the past 24 hour(s)) CBC WITH AUTOMATED DIFF Collection Time: 09/22/20 10:57 AM  
Result Value Ref Range WBC 13.3 (H) 4.3 - 11.1 K/uL  
 RBC 6.20 (H) 4.23 - 5.6 M/uL  
 HGB 17.9 (H) 13.6 - 17.2 g/dL HCT 52.1 (H) 41.1 - 50.3 % MCV 84.0 79.6 - 97.8 FL  
 MCH 28.9 26.1 - 32.9 PG  
 MCHC 34.4 31.4 - 35.0 g/dL  
 RDW 13.2 11.9 - 14.6 % PLATELET 819 136 - 498 K/uL MPV 12.0 9.4 - 12.3 FL ABSOLUTE NRBC 0.00 0.0 - 0.2 K/uL  
 DF AUTOMATED NEUTROPHILS 43 43 - 78 % LYMPHOCYTES 45 (H) 13 - 44 % MONOCYTES 9 4.0 - 12.0 % EOSINOPHILS 2 0.5 - 7.8 % BASOPHILS 1 0.0 - 2.0 % IMMATURE GRANULOCYTES 0 0.0 - 5.0 % ABS. NEUTROPHILS 5.7 1.7 - 8.2 K/UL  
 ABS. LYMPHOCYTES 6.0 (H) 0.5 - 4.6 K/UL  
 ABS. MONOCYTES 1.2 0.1 - 1.3 K/UL  
 ABS. EOSINOPHILS 0.2 0.0 - 0.8 K/UL  
 ABS. BASOPHILS 0.1 0.0 - 0.2 K/UL  
 ABS. IMM. GRANS. 0.1 0.0 - 0.5 K/UL Assessment/Plan:  
Acute ST elevation myocardial infarction (STEMI) involving left anterior descending (LAD) coronary artery (HCC) (9/22/2020)- ASA, heparin, IV  Nitro, emergent LHC, start statin, BB/ACE, admit to ICU, check echo Hypertension (9/22/2020)- change norvasc to ACE, BB Diabetes mellitus type 2, controlled ([facility] [address] (9/22/2020)- hold glucophage, cover w sliding scale Ángel Jimenez PA-C 
9/22/2020 
11:33 AM

## 2020-09-22 NOTE — ROUTINE PROCESS
TRANSFER - OUT REPORT: 
 
Verbal report given to ANGELINE TINAJERO [facility] on Ian Thakkar  being transferred to [facility] Cath lab for ordered procedure Report consisted of patients Situation, Background, Assessment and  
Recommendations(SBAR). Information from the following report(s) SBAR, ED Summary, MAR, Recent Results and Cardiac Rhythm STEMI was reviewed with the receiving nurse. Lines:  
Peripheral IV 09/22/20 Right Wrist (Active) Site Assessment Clean, dry, & intact 09/22/20 1058 Phlebitis Assessment 0 09/22/20 1058 Infiltration Assessment 0 09/22/20 1058 Dressing Status Clean, dry, & intact 09/22/20 1058 Hub Color/Line Status Pink 09/22/20 1058 Peripheral IV 09/22/20 Left Forearm (Active) Site Assessment Clean, dry, & intact 09/22/20 1058 Phlebitis Assessment 0 09/22/20 1058 Infiltration Assessment 0 09/22/20 1058 Dressing Status Clean, dry, & intact 09/22/20 1058 Hub Color/Line Status Green 09/22/20 1058 Action Taken Blood drawn 09/22/20 1058 Opportunity for questions and clarification was provided. Patient transported with: 
 Monitor O2 @ 2 liters Transported by [employer]

## 2020-09-22 NOTE — PROGRESS NOTES
Patient arrived from cath lab. Greenlandic speaking only. Alert, no visible signs of distress. Breathing unlabored, originally on 5L NC-  O2 sat decreasing, gradually increased oxygen to 10L NC. Impella site intact, no signs of bleeding, pulse palpable. Impella @ 91cm on P9. [place] in L groin. R radial site CDI with TR band in place. Abdomen soft, bowel sounds active. Patient with episode of vomiting. MD notified. Dual skin assessment completed with Rios Do RN. Sacrum and heels intact with no break down or redness. Allevyn applied to sacrum.

## 2020-09-22 NOTE — PROCEDURES
[address] [facility] 
CARDIAC CATH Name:  Bruno Tobin 
MR#:  [id number] :  1940 ACCOUNT #:  [de-identified] DATE OF SERVICE:  2020 PROCEDURES PERFORMED: 1. Left heart cath, selective coronary arteriography, and left ventriculogram via the right radial approach. 2.  PCI and stenting of the LAD. 3.  IVUS of LAD after stenting. 4.  Impella CP LVAD placement via the right common femoral artery. 5.  Placement of a continuous cardiac output [place]-Richard catheter via the left femoral vein. PREOPERATIVE DIAGNOSES:  Anterior myocardial infarction complicated by cardiogenic shock. POSTOPERATIVE DIAGNOSES:  Occluded left anterior descending requiring percutaneous coronary intervention and stenting. Cardiogenic shock. SURGEON:  Vanice Cheadle. MD Susan 
 
ASSISTANT:  None. ESTIMATED BLOOD LOSS:  Less than 5 mL. SPECIMENS REMOVED:  None. COMPLICATIONS:  None. FINDINGS:  As below. IMPLANTS:  Resolute [place] drug-eluting stent. ANESTHESIA:  The patient received moderate supervised conscious with 1 mg of Versed and 100 mcg of fentanyl. Sedation start time was 11:40 a.m. with a procedure completion time of 1307. Sedation was administered by Marea Sever, RN under my supervision. TECHNICAL FACTORS:  After informed consent was obtained, the patient was brought to the cardiac catheterization lab. The right radial artery was prepped and draped in the usual sterile fashion. Utilizing modified Seldinger technique and micropuncture needle, the right radial artery was entered. A 6-Kazakh Terumo Slender sheath was placed without difficulty. A radial cocktail consisting of 2000 units of heparin, 2 mg of verapamil, and 200 mcg of nitroglycerin was administered. A 5-Kazakh Tiger 4.0 catheter was used to select and engage the ostium of the left main coronary artery and right coronary artery respectively. It was noted that the LAD was occluded and therefore PCI of the LAD was performed. Please see PCI note as outlined below. Following PCI, a JL4 catheter was used to select and engage the ostium of the left circumflex as there was separate ostia of LAD and LCx. .  Selective injection performed. A pigtail catheter was used to across the aortic valve into the left ventricle. Hemodynamic measurements were obtained. The power injector was not working and a hand injection was performed with inadequate visualization of the left ventricle. Hemodynamic assessments and pullback gradient across the aortic valve were performed. At this point, the patient was referred for Impella CP placement. Please see procedure notes as outlined below in the interventional note. CONTRAST:  Isovue 170. HEMODYNAMIC RESULTS: 
1. Aortic pressure initially was 112/79 with a mean of 89. During the case, systolic blood pressure dropped into the 80s. His left ventricular end-diastolic pressure is 38. These findings were consistent with cardiogenic shock. 2.  There Was no significant gradient across the aortic valve. ANGIOGRAPHIC RESULTS: 
1. Left main coronary artery:  There was no significant left main coronary artery. There was separate ostium of the LAD and circumflex. 2.  LAD:  Contains 40% proximal stenosis, 100% mid occlusion. 3.  Left circumflex:  Medium caliber dominant vessel. Contains 30% ostial stenosis, 30% mid stenosis. 4.  First obtuse marginal artery:  Small caliber 2.25-mm vessel. 60-70% proximal stenosis. 5.  Second obtuse marginal.  Small caliber vessel. Patent. 6.  Left PDA:  It is a very small caliber diffusely diseased vessel, approximately 1.5-2 mm. 40% proximal stenosis. 7.  Right coronary artery: It is a nondominant vessel. It is occluded in the mid segment. Very small caliber. 8.  Left ventriculogram:  Uninterpretable based on hand injection. LVEDP severely elevated consistent with severe LV dysfunction. Echocardiogram has been ordered. CONCLUSION:  Multivessel coronary artery disease with active culprit of his anterior myocardial infarction and cardiogenic shock being occluded mid LAD. PLAN:  Proceed with PCI of the LAD and hemodynamic support. PCI NOTE: 
Lesion:  Mid LAD. Pre-stenosis 100% with HERNAN 0 flow, post-stenosis 0% with HERNAN 3 flow. TECHNICAL FACTORS:  The patient received intravenous heparin. ACT was greater than 300. A CLS 3.0 guide was used to select and engage the ostium of the left main coronary artery. A [place] wire was positioned distal to the occlusion. The occlusion was opened with a 2.25 x 15 mm balloon to 8 atmospheres. Following restoration of HERNAN 3 flow, intracoronary Aggrastat was administered. With this, there was HERNAN 3 distal flow. At this point, a Resolute [place] 2.75 x 38 mm drug-eluting sent was positioned in the mid vessel extending into the proximal vessel and deployed to 14 atmospheres. Following stent deployment, a second 3.5 x 26 mm Resolute [place] drug-eluting stent was positioned in the proximal vessel extending to the mid stented segment. This was deployed to 14 atmospheres. The entire distal portion of the stent and midportion was postdilated with 3.0 x 15 mm NC Quantum [place] to 18 atmospheres. The proximal portion was postdilated with NC Trek 3.5 x 15 mm balloon to 18 atmospheres. Following postdilatation, there was excellent angiographic result with HERNAN 3 distal flow. At this point, intravascular ultrasound was performed throughout the stented LAD, which showed the stent to be widely patent and well opposed. There was no evidence of dissection pre or post stent placement. There was diffuse LAD disease in the non stent segments, but this was nonobstructive. The patient developed progressive hypotension and hypoxia during the procedure. His EDP was severely elevated after the PCI was performed indicative of cardiogenic shock. At this point, a long multipurpose catheter was advanced and used to engage the ostium of the right common iliac. Iliac angiography was performed that showed 20% iliac stenosis in the common and external iliac and 20% femoral stenosis. Large caliber vessel which was amenable to large-bore arterial access. Under direct visualization, the right common femoral artery was entered. Two Percloses were placed in the 3 o'clock and 10 o'clock positions for pre-closure. The 14-Korean Impella sheath was placed. The Impella was placed in the LV and support was initiated with stabilization of the patient's condition. At this point, the breakaway sheath was removed and the tapered sheath was placed. This was sutured in place. To monitor his cardiac output and the weaning of the Impella, the left femoral vein was prepped and draped in the usual sterile fashion. Using ultrasound, identified the left common femoral vein. An 8-Korean sheath was placed without difficulty and a continuous output [place]-Richard catheter was advanced via the left femoral vein into the pulmonary artery. This was sutured in place and will be used in the ICU. CONCLUSION: 
1. Successful complex stenting of the proximal to mid LAD with overlapping 3.5 x 26 and 2.75 x 38 mm Resolute [place] drug-eluting stent. The mid and distal segment was postdilated with 3.0 mm in diameter with a noncompliant balloon. 2.  Successful intravascular ultrasound post stenting of the LAD. 3.  Impella CP LVAD placement via the right common femoral artery utilizing a pre-closure technique. 4.  Successful placement of a continuous output [place]-Richard catheter via the left common femoral vein. PLAN:  Monitor the patient closely in the postoperative setting. NOTE:  PAtient with runs of NSVT during procedure. IV amiodarone started with stabilization of rhythm. MD JORGE [email or web address] 
D:  09/22/2020 13:24 
T:  09/22/2020 14:45 JOB #:  [id number]

## 2020-09-22 NOTE — PROCEDURES
Brief Cardiac Procedure Note Patient: Lc Murphy MRN: [id number]  SSN: [id number] YOB: 1940  Age: 79 y.o. Sex: male Date of Procedure: 9/22/2020 Pre-procedure Diagnosis: Anterior STEMI Post-procedure Diagnosis: Coronary artery disease Procedure: Left Heart Catheterization with PCI Brief Description of Procedure: As above Performed By: Blaine Alva MD  
 
Assistants: None Anesthesia: Moderate Sedation Estimated Blood Loss: Less than 10 mL Specimens: None Implants: None Findings: Obstructive CAD. PCI of prox to mid LAD Resolute [place] 2.75 x 38 and 3.5 x 26 mm JORDAN. Cardiogenic shock. IMpella placed with preclosure. LVEDP 38. BP in 80-90. NSVT that did not require cardioversion. IV amiodarone started. Complications: None Recommendations: Continue medical therapy. Signed By: Blaine Alva MD   
 September 22, 2020

## 2020-09-22 NOTE — CONSULTS
LEAPFROG PROTOCOL NOTE Carla Mcfarlane 9/22/2020 The patient is currently in the critical care setting managed by Dr. Paulino Whiting with STEMI. The patient's chart is reviewed and the patient is discussed with the staff. Patient is currently hemodynamically stable. Patient has no needs identified for Intensivist management in the critical care setting at this time. Please notify us if can be of assistance. No charge billed to the patient. Thank you.  
 
FIONA Olguin

## 2020-09-22 NOTE — ROUTINE PROCESS
TRANSFER - OUT REPORT: 
 
[facility] Dr. Tien Thompson RRA w/ hemoband \"15\", 14F RFA sheath with impella, LFV 8F sheath with swan PTCA and 2 JORDAN-LAD Impella insertion, swan nicholas insertion Heparin 4000 units IV Versed 1mg, Fentanyl 100mcg Brilinta 180mg Lasix IV 20mg Amiodarone gtt 1mg/min @ 8561 Aggrastat gtt  0.075mcg/kg/min @5658 Nitro gtt @ 10mcg/min @ [id number] Verbal report given to [facility] RN(name) on Tiffany Number  being transferred to CPRU(unit) for change in patient condition(post STEMI) Report consisted of patients Situation, Background, Assessment and  
Recommendations(SBAR). Information from the following report(s) SBAR and Procedure Summary was reviewed with the receiving nurse. Lines:  
Peripheral IV 09/22/20 Right Wrist (Active) Site Assessment Clean, dry, & intact 09/22/20 1058 Phlebitis Assessment 0 09/22/20 1058 Infiltration Assessment 0 09/22/20 1058 Dressing Status Clean, dry, & intact 09/22/20 1058 Hub Color/Line Status Pink 09/22/20 1058 Peripheral IV 09/22/20 Left Forearm (Active) Site Assessment Clean, dry, & intact 09/22/20 1058 Phlebitis Assessment 0 09/22/20 1058 Infiltration Assessment 0 09/22/20 1058 Dressing Status Clean, dry, & intact 09/22/20 1058 Hub Color/Line Status Green 09/22/20 1058 Action Taken Blood drawn 09/22/20 1058 Opportunity for questions and clarification was provided. Patient transported with: 
 Monitor O2 @ 5 liters Registered Nurse

## 2020-09-22 NOTE — CONSULTS
HOSPITALIST H&P/CONSULT 
NAME:  Tiffany Number Age:  79 y.o. 
:   1940 MRN:   [id number] PCP: None Consulting MD: Treatment Team: Attending Provider: Riki Myrick MD; Physician: Riki Myrick MD; Care Manager: Anibal Villalobos, RN; Consulting Provider: Linh Candelaria MD 
HPI:  
Patient is a 79years old [address] speaking female admitted at [facility] for STEMI in view of chest pain and EKG changes. Pt has hx of HTN, DM-2, dyslipidemia. LHC showed occluded left anterior descending requiring PCI. Pt was also noted to be in cardiogenic shock. Hospitalist team consulted for UTI and medical management. Complete ROS done and is as stated in HPI or otherwise negative Past Medical History:  
Diagnosis Date  Diabetes ([address]  Hypertension No past surgical history on file. Prior to Admission Medications Prescriptions Last Dose Informant Patient Reported? Taking? amLODIPine (Norvasc) 10 mg tablet   Yes No  
Sig: Take 10 mg by mouth daily. glyBURIDE-metFORMIN (GLUCOVANCE) 2.5-500 mg per tablet   Yes No  
Sig: Take 2 Tabs by mouth two (2) times daily (with meals). hydroCHLOROthiazide (HYDRODIURIL) 25 mg tablet   Yes No  
Sig: Take 25 mg by mouth daily. Facility-Administered Medications: None No Known Allergies Social History Tobacco Use  Smoking status: Never Smoker  Smokeless tobacco: Never Used Substance Use Topics  Alcohol use: Not on file No family history on file. Objective:  
 
Visit Vitals /84 Pulse 76 Temp 98.9 °F (37.2 °C) Resp 27 SpO2 93% Temp (24hrs), Av.4 °F (36.9 °C), Min:97.8 °F (36.6 °C), Max:98.9 °F (37.2 °C) Oxygen Therapy O2 Sat (%): 93 % (20) Pulse via Oximetry: 78 beats per minute (20) O2 Device: Nasal cannula (20 1459) O2 Flow Rate (L/min): 10 l/min (20 151) Physical Exam: 
General:    Alert, cooperative, no distress, appears stated age. Head:   Normocephalic, without obvious abnormality, atraumatic. Nose:  Nares normal. No drainage or sinus tenderness. Lungs:   Crackles bilaterally Heart:   Regular rate and rhythm,  no murmur, rub or gallop. Abdomen:   Soft, non-tender. Not distended. Bowel sounds normal.  
Extremities: No cyanosis. No edema. No clubbing Skin:     Texture, turgor normal. No rashes or lesions. Not Jaundiced Neurologic: Alert and oriented x 3, no focal deficits Data Review:  
Recent Results (from the past 24 hour(s)) CBC WITH AUTOMATED DIFF Collection Time: 09/22/20 10:57 AM  
Result Value Ref Range WBC 13.3 (H) 4.3 - 11.1 K/uL  
 RBC 6.20 (H) 4.23 - 5.6 M/uL  
 HGB 17.9 (H) 13.6 - 17.2 g/dL HCT 52.1 (H) 41.1 - 50.3 % MCV 84.0 79.6 - 97.8 FL  
 MCH 28.9 26.1 - 32.9 PG  
 MCHC 34.4 31.4 - 35.0 g/dL  
 RDW 13.2 11.9 - 14.6 % PLATELET 437 480 - 874 K/uL MPV 12.0 9.4 - 12.3 FL ABSOLUTE NRBC 0.00 0.0 - 0.2 K/uL  
 DF AUTOMATED NEUTROPHILS 43 43 - 78 % LYMPHOCYTES 45 (H) 13 - 44 % MONOCYTES 9 4.0 - 12.0 % EOSINOPHILS 2 0.5 - 7.8 % BASOPHILS 1 0.0 - 2.0 % IMMATURE GRANULOCYTES 0 0.0 - 5.0 %  
 ABS. NEUTROPHILS 5.7 1.7 - 8.2 K/UL  
 ABS. LYMPHOCYTES 6.0 (H) 0.5 - 4.6 K/UL  
 ABS. MONOCYTES 1.2 0.1 - 1.3 K/UL  
 ABS. EOSINOPHILS 0.2 0.0 - 0.8 K/UL  
 ABS. BASOPHILS 0.1 0.0 - 0.2 K/UL  
 ABS. IMM. GRANS. 0.1 0.0 - 0.5 K/UL METABOLIC PANEL, COMPREHENSIVE Collection Time: 09/22/20 10:57 AM  
Result Value Ref Range Sodium 139 136 - 145 mmol/L Potassium 3.5 3.5 - 5.1 mmol/L Chloride 103 98 - 107 mmol/L  
 CO2 21 21 - 32 mmol/L Anion gap 15 7 - 16 mmol/L Glucose 254 (H) 65 - 100 mg/dL BUN 12 8 - 23 MG/DL Creatinine 1.42 0.8 - 1.5 MG/DL  
 GFR est AA >60 >60 ml/min/1.73m2 GFR est non-AA 51 (L) >60 ml/min/1.73m2 Calcium 10.1 8.3 - 10.4 MG/DL  Bilirubin, total 0.6 0.2 - 1.1 MG/DL  
 ALT (SGPT) 58 12 - 65 U/L  
 AST (SGOT) 37 15 - 37 U/L  
 Alk. phosphatase 149 (H) 50 - 136 U/L Protein, total 9.1 (H) 6.3 - 8.2 g/dL Albumin 4.5 3.2 - 4.6 g/dL Globulin 4.6 (H) 2.3 - 3.5 g/dL A-G Ratio 1.0 (L) 1.2 - 3.5    
TROPONIN-HIGH SENSITIVITY Collection Time: 09/22/20 10:57 AM  
Result Value Ref Range Troponin-High Sensitivity 42.5 (H) 0 - 14 pg/mL EKG, 12 LEAD, INITIAL Collection Time: 09/22/20  4:31 PM  
Result Value Ref Range Ventricular Rate 85 BPM  
 Atrial Rate 85 BPM  
 P-R Interval 128 ms QRS Duration 84 ms Q-T Interval 362 ms QTC Calculation (Bezet) 430 ms Calculated P Axis 54 degrees Calculated R Axis -64 degrees Calculated T Axis 87 degrees Diagnosis Sinus rhythm with frequent Premature ventricular complexes Left anterior fascicular block Anterior infarct (cited on or before 22-SEP-2020) Inferior injury pattern 
** ** ACUTE MI / STEMI ** ** Abnormal ECG When compared with ECG of 22-SEP-2020 10:51, Fusion complexes are no longer Present Left anterior fascicular block is now Present Serial changes of evolving Anterior infarct Present POC ACTIVATED CLOTTING TIME Collection Time: 09/22/20  5:11 PM  
Result Value Ref Range Activated Clotting Time (POC) 120 70 - 128 SECS  
URINALYSIS W/ RFLX MICROSCOPIC Collection Time: 09/22/20  5:14 PM  
Result Value Ref Range Color RED Appearance CLOUDY Specific gravity 1.025 (H) 1.001 - 1.023    
 pH (UA) 5.0 5.0 - 9.0 Protein 100 (A) NEG mg/dL Glucose Negative mg/dL Ketone 40 (A) NEG mg/dL Bilirubin LARGE (A) NEG Blood LARGE (A) NEG Urobilinogen 1.0 0.2 - 1.0 EU/dL Nitrites Positive (A) NEG Leukocyte Esterase MODERATE (A) NEG    
 WBC 0-3 0 /hpf  
 RBC  0 /hpf Epithelial cells 0-3 0 /hpf Bacteria 0 0 /hpf Other observations RESULTS VERIFIED MANUALLY    
CBC WITH AUTOMATED DIFF Collection Time: 09/22/20  5:18 PM  
Result Value Ref Range WBC 19.9 (H) 4.3 - 11.1 K/uL RBC 5.68 (H) 4.23 - 5.6 M/uL  
 HGB 16.4 13.6 - 17.2 g/dL HCT 49.2 41.1 - 50.3 % MCV 86.6 79.6 - 97.8 FL  
 MCH 28.9 26.1 - 32.9 PG  
 MCHC 33.3 31.4 - 35.0 g/dL  
 RDW 13.6 11.9 - 14.6 % PLATELET 928 926 - 596 K/uL MPV 11.1 9.4 - 12.3 FL ABSOLUTE NRBC 0.00 0.0 - 0.2 K/uL NEUTROPHILS 83 (H) 43 - 78 % LYMPHOCYTES 8 (L) 13 - 44 % MONOCYTES 8 4.0 - 12.0 % EOSINOPHILS 0 (L) 0.5 - 7.8 % BASOPHILS 0 0.0 - 2.0 % IMMATURE GRANULOCYTES 1 0.0 - 5.0 %  
 ABS. NEUTROPHILS 16.5 (H) 1.7 - 8.2 K/UL  
 ABS. LYMPHOCYTES 1.6 0.5 - 4.6 K/UL  
 ABS. MONOCYTES 1.6 (H) 0.1 - 1.3 K/UL  
 ABS. EOSINOPHILS 0.0 0.0 - 0.8 K/UL  
 ABS. BASOPHILS 0.0 0.0 - 0.2 K/UL  
 ABS. IMM. GRANS. 0.2 0.0 - 0.5 K/UL  
 DF AUTOMATED    
POC ACTIVATED CLOTTING TIME Collection Time: 09/22/20  6:05 PM  
Result Value Ref Range Activated Clotting Time (POC) 147 (H) 70 - 128 SECS Imaging Georgie Sharma Hilario Pjnola CXR: 
IMPRESSION: 
  
1. Interstitial and alveolar pulmonary edema. 
  
2. A catheter tip overlies a left pulmonary arterial branch. This is favored to 
be a [place]-Richard catheter arising from the groin. Correlation for desired 
positioning is recommended. 
  
3. Impella device with leads overlying the ascending aorta and left ventricle. Correlation for desired positioning recommended. 
  
Assessment and Plan: Active Hospital Problems Diagnosis Date Noted  Acute cystitis without hematuria 09/22/2020 Priority: 1 - One  
 STEMI (ST elevation myocardial infarction) ([facility] 09/22/2020 Priority: 2 - Two  Acute ST elevation myocardial infarction (STEMI) involving left anterior descending (LAD) coronary artery ([facility] 09/22/2020  Hypertension 09/22/2020  Diabetes mellitus type 2, [facility] 09/22/2020 PLAN 
· F/u with urine and blood cultures · Check LA · Start IV rocephin 1 gm IV daily after urine culture is sent.  Pt can be switched to oral antibiotic based on culture sensitivity to complete 7 days course of Rx · F/u with A1C 
· Monitor poc glucose qachs. Start humalog SS. · Continue to optimize medical rx for STEMI as per cards Pt not billed for this visit. Please, call us for any questions as needed. Signed By: Jade Smith MD   
 September 22, 2020

## 2020-09-22 NOTE — PROGRESS NOTES
present over the phone for encounter with nurse. Trish Mir [facility] INTERPRETERTM Language Services Department 
[address]  [facility] 
[phone] (phone)

## 2020-09-22 NOTE — PROGRESS NOTES
PA catheter pulled back in RV. Unable to advance to PA. Will monitor and if needed plan to replace in cath lab tomorrow if problems with weaning impella. Currently hypertensive. HAs bloody urine. Will check q 12 CBC to exclude hemolysis. EF severely reduced on echo.   
 
Shirley Rivera MD

## 2020-09-22 NOTE — PROGRESS NOTES
called to cath lab waiting room, met with patient's family, offered support, assurance of spiritual care staff's prayers. Florentino CLARK

## 2020-09-22 NOTE — ED PROVIDER NOTES
25-year-old gentleman with 30 minutes of substernal chest pain. Does not speak [place].  used. Diaphoresis. No radiation of pain or vomiting. No pain like this before. Past history is that of hypertension and diabetes. Daughter accompanies the patient states no cardiac history in the past.  No fever no cough but does have some shortness of breath. The history is provided by the patient and a relative. A  was used. Chest Pain This is a new problem. The current episode started less than 1 hour ago. The problem has not changed since onset. The pain is associated with exertion. The pain is present in the substernal region. The pain is severe. The quality of the pain is described as pressure-like. The pain does not radiate. Associated symptoms include diaphoresis and shortness of breath. Pertinent negatives include no abdominal pain, no back pain, no cough, no fever and no vomiting. He has tried nothing for the symptoms. Risk factors include diabetes mellitus and hypertension. His past medical history is significant for HTN. His past medical history does not include DVT or PE. Pertinent negatives include no cardiac catheterization. Shortness of Breath Associated symptoms include chest pain. Pertinent negatives include no fever, no cough, no vomiting and no abdominal pain. Associated medical issues do not include PE or DVT. No past medical history on file. No past surgical history on file. No family history on file. Social History Socioeconomic History  Marital status: Not on file Spouse name: Not on file  Number of children: Not on file  Years of education: Not on file  Highest education level: Not on file Occupational History  Not on file Social Needs  Financial resource strain: Not on file  Food insecurity Worry: Not on file Inability: Not on file  Transportation needs Medical: Not on file Non-medical: Not on file Tobacco Use  Smoking status: Not on file Substance and Sexual Activity  Alcohol use: Not on file  Drug use: Not on file  Sexual activity: Not on file Lifestyle  Physical activity Days per week: Not on file Minutes per session: Not on file  Stress: Not on file Relationships  Social connections Talks on phone: Not on file Gets together: Not on file Attends Anglican service: Not on file Active member of club or organization: Not on file Attends meetings of clubs or organizations: Not on file Relationship status: Not on file  Intimate partner violence Fear of current or ex partner: Not on file Emotionally abused: Not on file Physically abused: Not on file Forced sexual activity: Not on file Other Topics Concern  Not on file Social History Narrative  Not on file ALLERGIES: Patient has no known allergies. Review of Systems Unable to perform ROS: Acuity of condition Constitutional: Positive for diaphoresis. Negative for fever. Respiratory: Positive for shortness of breath. Negative for cough. Cardiovascular: Positive for chest pain. Gastrointestinal: Negative for abdominal pain and vomiting. Musculoskeletal: Negative for back pain. Vitals:  
 09/22/20 1053 BP: (!) 176/99 Pulse: 99 Resp: 28 Physical Exam 
Vitals signs and nursing note reviewed. Constitutional:   
   Appearance: He is well-developed. He is ill-appearing. He is not diaphoretic. HENT:  
   Head: Normocephalic and atraumatic. Right Ear: External ear normal.  
   Left Ear: External ear normal.  
Eyes:  
   Conjunctiva/sclera: Conjunctivae normal.  
   Pupils: Pupils are equal, round, and reactive to light. Neck: Musculoskeletal: Normal range of motion and neck supple. Cardiovascular:  
   Rate and Rhythm: Normal rate and regular rhythm. Heart sounds: Normal heart sounds. Pulmonary:  
   Effort: Pulmonary effort is normal.  
   Breath sounds: Normal breath sounds. Abdominal:  
   General: There is no distension. Palpations: There is no mass. Tenderness: There is no abdominal tenderness. Skin: 
   General: Skin is warm and dry. Neurological:  
   Mental Status: He is alert and oriented to person, place, and time. MDM Number of Diagnoses or Management Options Acute MI anterior wall first episode care [facility]):  
Diagnosis management comments: Large amount of ST elevation. Code STEMI called immediately. Aspirin, heparin, pain control. Nitroglycerin drip. Discussed with cardiology who will accept the patient. Amount and/or Complexity of Data Reviewed Clinical lab tests: ordered and reviewed Tests in the medicine section of CPT®: ordered and reviewed Risk of Complications, Morbidity, and/or Mortality Presenting problems: high Diagnostic procedures: low Management options: moderate Patient Progress Patient progress: stable Procedures

## 2020-09-22 NOTE — PROGRESS NOTES
Chart reviewed and pt seen in ICU s/p tx from  for emergent heart cath and impella by Dr. Dane Ramos. Pt does not speak English, nor does wife. Daughter, Dasha Herron able to answer questions. Confirms demographics. Pt lives with wife and daughter. Does not use anything to ambulate. Does not drive. Pt is not citizen per daughter. Does go to [facility] for PCP. Daughter aware CM will follow for any d/c needs/POC. Questions answered and RN aware regarding visiting and staying with pt. Care Management Interventions PCP Verified by CM: Yes(Jorden Ross/Mark Schulte) Mode of Transport at Discharge: Other (see comment) Transition of Care Consult (CM Consult): Discharge Planning Discharge Durable Medical Equipment: (glucometer) Current Support Network: Lives with Spouse(lives with spouse and daughter, Dasha Herron -[phone]/pt has 11 children/2 local) Confirm Follow Up Transport: Family Freedom of Choice List was Provided with Basic Dialogue that Supports the Patient's Individualized Plan of Care/Goals, Treatment Preferences and Shares the Quality Data Associated with the Providers?: Yes [employer] Information Provided?: (no payor source/DECO to follow) Discharge Location Discharge Placement: Unable to determine at this time

## 2020-09-22 NOTE — PROGRESS NOTES
present over the phone for medication with nurse. Amberly Chavez Dr staff  available over the phone from 3:30 p.m. - midnight. Please call Kayla at [phone] with any interpreting requests. Sen Tillman CERT[facility]IED [facility] INTERPRETERTM Language Services Department 
[address]  [facility] 
[phone] (phone)

## 2020-09-22 NOTE — ACP (ADVANCE CARE PLANNING)
ACP: Pt does not have LW/HCPOA. Pt's wife, Skyler Lowery, is legal decision maker if pt can not make decisions for self. She does not speak [place]. Daughter, Nimo Oneill -[phone], speaks [place]. Encouraged daughter to have pt complete prior to d/c with  when stable. Verbalized understanding.  Pt has 11 children, but only 2 local.

## 2020-09-23 PROBLEM — N17.9 ACUTE RENAL FAILURE (ARF) (HCC): Status: ACTIVE | Noted: 2020-01-01

## 2020-09-23 NOTE — PROGRESS NOTES
Zofran 4 mg IV for nausea. Breakfast tray to bedside. BG =470 coverage given. Dr Arcelia Alas made aware of elevated BG during rounds.

## 2020-09-23 NOTE — CONSULTS
ANAND NEPHROLOGY CONSULT NOTE Admission Date: 
9/22/2020 Admission Diagnosis: [facility] ([facility]) [facility] [I21.3] Consulting physician:   Estephania Meyers Reason for consult:   MAURICIO Subjective:  
History of Present Illness: 
 
Mr. Naldo Mcnamara is a 80 yo M with a PMH of HTN and DM who presented with CP and SOB who was found to have an STEMI. Went to cath lab yesterday had stent placed in LAD. Now in the ICU with impella in place. This AM, noted to have an increased Cr from 1.4->2.7 continues to have good UOP, noted to be frankly bloody. Patient is [place] speaking only and was interviewed today with help form  by phone. He says CP and SOB have improved today. Offers no acute complaints. Past Medical History:  
Diagnosis Date  Diabetes ([facility]  Hypertension No past surgical history on file. Current Facility-Administered Medications Medication Dose Route Frequency  LORazepam (ATIVAN) injection 1 mg  1 mg IntraVENous Q2H PRN  
 insulin glargine (LANTUS) injection 10 Units  10 Units SubCUTAneous DAILY  carvediloL (COREG) tablet 3.125 mg  3.125 mg Oral BID WITH MEALS  
 atorvastatin (LIPITOR) tablet 80 mg  80 mg Oral DAILY  0.9% sodium chloride infusion  75 mL/hr IntraVENous CONTINUOUS  
 sodium chloride (NS) flush 5-40 mL  5-40 mL IntraVENous Q8H  
 sodium chloride (NS) flush 5-40 mL  5-40 mL IntraVENous PRN  
 acetaminophen (TYLENOL) tablet 650 mg  650 mg Oral Q4H PRN  
 morphine injection 2 mg  2 mg IntraVENous Q4H PRN  
 nitroglycerin (NITROSTAT) tablet 0.4 mg  0.4 mg SubLINGual Q5MIN PRN  
 aspirin chewable tablet 81 mg  81 mg Oral DAILY  HYDROcodone-acetaminophen (NORCO) 5-325 mg per tablet 1 Tab  1 Tab Oral Q4H PRN  
 ticagrelor (BRILINTA) tablet 90 mg  90 mg Oral Q12H  
 nitroglycerin (Tridil) 200 mcg/ml infusion  0-20 mcg/min IntraVENous TITRATE  heparin (porcine) 25,000 units in 0.45% saline 500 ml infusion  12-25 Units/kg/hr IntraVENous TITRATE  heparin (porcine) 12,500 Units in dextrose 10% 1,000 mL for impella device  4-20 mL/hr IntraCATHeter- ARTerial TITRATE  heparinized saline 2 units/mL infusion  3 Units/hr IntraarTERial CONTINUOUS  
 heparin (porcine) 10,000 unit/mL injection 1,000-10,000 Units  1,000-10,000 Units IntraVENous Multiple  amiodarone (CORDARONE) 450 mg in dextrose 5% 250 mL infusion  1 mg/min IntraVENous TITRATE  ondansetron (ZOFRAN) injection 4 mg  4 mg IntraVENous Q4H PRN  
 hydrALAZINE (APRESOLINE) tablet 25 mg  25 mg Oral QID  furosemide (LASIX) injection 40 mg  40 mg IntraVENous Q12H  
 insulin lispro (HUMALOG) injection   SubCUTAneous AC&HS  cefTRIAXone (ROCEPHIN) 1 g in 0.9% sodium chloride (MBP/ADV) 50 mL  1 g IntraVENous Q24H No Known Allergies Social History Tobacco Use  Smoking status: Never Smoker  Smokeless tobacco: Never Used Substance Use Topics  Alcohol use: Not on file No family history on file. Review of Systems- 12pt ROS otherwise negative Objective:  
Vitals:  
 09/23/20 2125 09/23/20 6707 09/23/20 9226 09/23/20 0945 BP: 133/89 (!) 137/96 (!) 157/103 (!) 153/101 Pulse: 89 91 96 90 Resp: 26 21 28 24 Temp:      
SpO2: 91% 95% 94% 90% Intake/Output Summary (Last 24 hours) at 9/23/2020 1009 Last data filed at 9/23/2020 0900 Gross per 24 hour Intake 660.3 ml Output 1670 ml Net -1009.7 ml Physical Exam 
GEN :in no distress, alert and oriented HEENT: anicteric sclerae, eomi. Oropharynx without lesions. Neck - supple without JVD, no thyromegaly. No lymphadenopathy. CV - regular rate and rhythm, no murmur, no rub Lung - clear bilaterally, lungs expand symmetrically Abd - soft, nontender, bowel sounds present Ext - no clubbing, no cyanosis, trace edema Neurologic - nonfocal 
Skin - no rashes, no purpura, no ecchymoses Psychiatric: Normal mood and affect. Data Review:  
Recent Labs  
  09/23/20 
0256 09/22/20 
1718 09/22/20 
1057 WBC 21.2* 19.9* 13.3* HGB 15.3 16.4 17.9*  
HCT 44.1 49.2 52.1*  
 305 300 Recent Labs  
  09/23/20 
0538 09/22/20 
1057  139  
K 5.6* 3.5  103 CO2 15* 21 BUN 31* 12  
CREA 2.70* 1.42  
* 254* CA 7.8* 10.1 MG 1.8  -- No results for input(s): PH, PCO2, PO2, PCO2 in the last 72 hours. Problem List:  
 
Patient Active Problem List  
 Diagnosis Date Noted  STEMI (ST elevation myocardial infarction) ([facility] 09/22/2020 Priority: 1 - One  Acute [facility] failure ([facility]F) [facility] 09/23/2020  Acute ST elevation myocardial infarction (STEMI) involving left anterior descending (LAD) coronary artery ([facility] 09/22/2020  Hypertension 09/22/2020  Diabetes mellitus type 2, controlled ([facility] 09/22/2020  Acute cystitis without hematuria 09/22/2020 Assessment and Plan: 
 
1) MAURICIO-  Likely combination of ATN from ishcemia and contrast injury. Continues to make urine. Will repeat UA and urine indices and check a renal US. Monitor electrolytes, renal function and UOP. 2) Hyperkalemia-  K up slightly. Got K replacement overnight. Given a dose of lasix this AM.  Will repeat lab at noon. 3) Lactic Acidosis- Lactate 10. trending 4) STEMI- s/p LHC. Heparin gtt. Impella 5) DM- hypergylcemic. Management per primary. 6) Gross hematuria- likely leon trauma with Heparin gtt. Monitoring.  
 
Treasure Salgado MD

## 2020-09-23 NOTE — PROGRESS NOTES
R pedal pulse unable to palpate. Attempted to doppler with no pulse located. Notified Kaya Madison of findings. Orders to check popliteal. Loosened knee immobilizer to obtain pulse with return of circulation via doppler to R foot. Replaced knee immobilizer but loosened strapping. Will monitor

## 2020-09-23 NOTE — PROGRESS NOTES
followed up with patient, met with patient and family at bedside, offered support, assurance of spiritual care staff's prayers. Diogo CLARK

## 2020-09-23 NOTE — PROGRESS NOTES
Bedside, Verbal and Written shift change report given to Lavern Desouza (oncoming nurse) by Emilee Carreno (offgoing nurse). Report included the following information SBAR, Kardex, Procedure Summary, Intake/Output, MAR, Recent Results, Med Rec Status, Cardiac Rhythm SR and Alarm Parameters . [place] at 70 cm Impella at 91 cm Bilateral knee immobilizers in place.

## 2020-09-23 NOTE — PROGRESS NOTES
Bedside report received from [facility]. Pt is awake and alert. Family at bedside. impella placement and swan placement verified. [place] at 65 cm and impella at 91 cm. Doppler pulses weak to both feet. Heparin gtt rate verified. Next ACT at 0830.

## 2020-09-23 NOTE — PROGRESS NOTES
Pt moaning. Still nauseated. Unsure if its pain or discomfort from nausea. Pt given morphine sulfate 2 mg iv as BP is elevated.

## 2020-09-23 NOTE — PROGRESS NOTES
rounded on patient with nurse. Interpreting services offered when needed. [facility] staff  available over the phone from 3:30 p.m. - midnight. Please call Kayla at [phone] with any interpreting requests. Trish Mir [facility] INTERPRETERTM Language Services Department 
[address]  [facility] 
[phone] (phone)

## 2020-09-23 NOTE — PROGRESS NOTES
rounded on patient with nurse Sunny Bae and was present over the phone for nurse and family member. [facility] staff  available over the phone from 3:30 p.m. - midnight. Please call Kayla at [phone] with any interpreting requests. Stormy Lezama CERTIFIED [facility] INTERPRETERTM Language Services Department 
[facility] [address] [place] [address] 
[phone] (phone)

## 2020-09-23 NOTE — ROUTINE PROCESS
Language services available from 8:00am-4:30pm. Please call the number below with any request.  
 
Claudia Ortiz [facility] [id number]  [employer] Language Services Department 
[address]  [facility] 
[phone] (phone)

## 2020-09-23 NOTE — PROGRESS NOTES
Pt states he feels bad all over. Some nausea, some discomfort from not having a bowel movement and just pain all over. Denies any chest pain. Ativan 1 mg iv given for nausea.

## 2020-09-23 NOTE — PROGRESS NOTES
Dr Dany Guy at bedside. Updated on pt and lactic acid result. Will not wean impella today. New order for ativan for comfort and repeat lactic at noon today.

## 2020-09-23 NOTE — PROGRESS NOTES
[facility] CARDIOLOGY PROGRESS NOTE 
 
9/23/2020 9:56 AM 
 
Admit Date: 9/22/2020 Subjective:  
Stable overnight without angina, CHF, or palpitations. Vitals stable and controlled. No other complaints overnight. Tolerating meds well. Lactate still 10 this AM.  Renal function acutely worse. Potassium elevated. Remains seriously ill but stable. Objective:  
  
Vitals:  
 09/23/20 0882 09/23/20 4920 09/23/20 1656 09/23/20 0945 BP: 133/89 (!) 137/96 (!) 157/103 (!) 153/101 Pulse: 89 91 96 90 Resp: 26 21 28 24 Temp:      
SpO2: 91% 95% 94% 90% Physical Exam: 
Neck- supple, cannot assess JVD 
CV- regular rate and rhythm no MRG Lung- clear bilaterally anterolaterally Abd- soft, nontender, nondistended Ext- no edema distally, groin access sites CDI Skin- warm and dry Data Review:  
Recent Labs  
  09/23/20 
0538 09/23/20 
0256 09/22/20 
1718 09/22/20 
1057   --   --  139  
K 5.6*  --   --  3.5 MG 1.8  --   --   --   
BUN 31*  --   --  12  
CREA 2.70*  --   --  1.42  
*  --   --  254* WBC  --  21.2* 19.9* 13.3* HGB  --  15.3 16.4 17.9* HCT  --  44.1 49.2 52.1*  
PLT  --  293 305 300 CHOL 132  --   --   --   
TRIGL 163*  --   --   --   
HDL 48  --   --   --   
 
 
Assessment and Plan:  
  Acute ST elevation myocardial infarction (STEMI) involving left anterior descending (LAD) coronary artery ([facility] [address] (9/22/2020)- stable without angina or CHF, impella functioning well, continue heparin per protocol. Lactate at noon and tomorrow AM 
 
Principal Problem: 
  Acute cystitis with  hematuria (9/22/2020)- urology consult if continues. Active Problems: 
  Acute systolic CHF- stable with impella, add coreg, titrate as tolerated. No ACE/ARB with acute renal failure. Titrate hydralazine and NTG as tolerated but avoid hypotension with ARF.   Acute renal failure- likely hypoperfusion/contrast combo related- consult nephrology, follow hematuria, recheck BMP in AM 
  
 Hypertension (9/22/2020)- coreg 3.125mg BID now, afterload reduction as tolerated. Diabetes mellitus type 2, controlled ([facility] [address] (9/22/2020)- per ICU protocol LIZETTE Braden MD 
[facility] Cardiology Pager [phone]

## 2020-09-23 NOTE — PROGRESS NOTES
Interdisciplinary team rounds were held 9/23/2020 with the following team members:Care Management, Nursing, Nurse Practitioner, Nutrition, Outcomes Management, Palliative Care, Pastoral Care, Pharmacy, Physical Therapy, Physician, Respiratory Therapy and Clinical Coordinator and the patient and child(gina). Plan of care discussed. See clinical pathway and/or care plan for interventions and desired outcomes.

## 2020-09-23 NOTE — PROGRESS NOTES
Dr Jovita Gerardo updated on pt's breathing and JVD. Respiratory rate is rapid and shallow. New orders received. Dr Titi Calderon updated on BG, new orders noted.

## 2020-09-23 NOTE — CONSULTS
Consult Patient: Ines Salcido MRN: [id number]  SSN: [id number] YOB: 1940  Age: 79 y.o. Sex: male Subjective:  
  
Ines Salcido is a 79 y.o. male who is being seen for consultation for DM management, MAURICIO and hyperkalemia. Patient has DM and hypertension. He speaks only [place] and daughter is at bedside helping with communication. Patient has been admitted on 9/22/2020 due to chest pain. He had STEMI and underwent PTCA. \" PROCEDURES PERFORMED: 1. Left heart cath, selective coronary arteriography, and left ventriculogram via the right radial approach. 2.  PCI and stenting of the LAD. 3.  IVUS of LAD after stenting. 4.  Impella CP LVAD placement via the right common femoral artery. 5.  Placement of a continuous cardiac output Brielle-Richard catheter via the left femoral vein. \" His BS is elevated. His creatinine is up after left heart catheterization and he has hyperkalemia and metabolic acidosis as well. Past Medical History:  
Diagnosis Date  Diabetes ([address]  Hypertension No past surgical history on file. No family history on file. Social History Tobacco Use  Smoking status: Never Smoker  Smokeless tobacco: Never Used Substance Use Topics  Alcohol use: Not on file Current Facility-Administered Medications Medication Dose Route Frequency Provider Last Rate Last Dose  LORazepam (ATIVAN) injection 1 mg  1 mg IntraVENous Q2H PRN Kendrick Lemus MD      
 insulin glargine (LANTUS) injection 10 Units  10 Units SubCUTAneous DAILY Meera Sanders MD   10 Units at 09/23/20 0904  
 0.9% sodium chloride infusion  75 mL/hr IntraVENous CONTINUOUS Brandon Davis MD 75 mL/hr at 09/22/20 1505 75 mL/hr at 09/22/20 1505  sodium chloride (NS) flush 5-40 mL  5-40 mL IntraVENous Q8H Brandon Davis MD   10 mL at 09/23/20 4117  sodium chloride (NS) flush 5-40 mL  5-40 mL IntraVENous PRN Brandon Davis MD      
  acetaminophen (TYLENOL) tablet 650 mg  650 mg Oral Q4H PRN Opal Davis MD      
 morphine injection 2 mg  2 mg IntraVENous Q4H PRN Opal Davis MD   2 mg at 09/23/20 0149  
 nitroglycerin (NITROSTAT) tablet 0.4 mg  0.4 mg SubLINGual Q5MIN PRN Opal Davis MD      
 aspirin chewable tablet 81 mg  81 mg Oral DAILY Opal Davis MD   81 mg at 09/23/20 0810  
 HYDROcodone-acetaminophen (NORCO) 5-325 mg per tablet 1 Tab  1 Tab Oral Q4H PRN Opal Davis MD      
 [facility] tablet 90 mg  90 mg Oral Q12H Opal Davis MD   90 mg at 09/23/20 0535  
 nitroglycerin (Tridil) 200 mcg/ml infusion  0-20 mcg/min IntraVENous TITRATE Opal Davis MD 4.5 mL/hr at 09/22/20 1611 15 mcg/min at 09/22/20 1611  
 heparin (porcine) 25,000 units in 0.45% saline 500 ml infusion  12-25 Units/kg/hr IntraVENous TITRATE Opal Davis MD 14.7 mL/hr at 09/23/20 0900 9 Units/kg/hr at 09/23/20 0900  
 heparin (porcine) 12,500 Units in dextrose 10% 1,000 mL for impella device  4-20 mL/hr IntraCATHeter- ARTerial TITRATE Opal Davis MD 10.5 mL/hr at 09/22/20 1505 10.5 mL/hr at 09/22/20 1505  heparinized saline 2 units/mL infusion  3 Units/hr IntraarTERial CONTINUOUS Opal Davis MD   Stopped at 09/22/20 1315  heparin (porcine) 10,000 unit/mL injection 1,000-10,000 Units  1,000-10,000 Units IntraVENous Multiple Opal Davis MD   4,000 Units at 09/22/20 1134  
 amiodarone (CORDARONE) 450 mg in dextrose 5% 250 mL infusion  1 mg/min IntraVENous TITRATE Opal Davis MD 16.7 mL/hr at 09/22/20 1911 0.5 mg/min at 09/22/20 1911  
 ondansetron (ZOFRAN) injection 4 mg  4 mg IntraVENous Q4H PRN Opal Davis MD   4 mg at 09/23/20 0745  hydrALAZINE (APRESOLINE) tablet 25 mg  25 mg Oral QID Opal Davis MD   25 mg at 09/23/20 0185  furosemide (LASIX) injection 40 mg  40 mg IntraVENous Q12H Susan Megha Archer MD   40 mg at 09/23/20 0810  
 insulin lispro (HUMALOG) injection   SubCUTAneous AC&HS Lord Leandra MD   10 Units at 09/23/20 2945  cefTRIAXone (ROCEPHIN) 1 g in 0.9% sodium chloride (MBP/ADV) 50 mL  1 g IntraVENous Q24H Lord Leandra  mL/hr at 09/22/20 1950 1 g at 09/22/20 1950 No Known Allergies Review of Systems: 
 
10-point review of systems is negative except what is mentioned in the present illness section. Objective:  
 
Vitals:  
 09/23/20 9767 09/23/20 0745 09/23/20 5852 09/23/20 2427 BP: (!) 134/97 (!) 139/99 (!) 138/97 (!) 133/97 Pulse: 86 91 88 86 Resp: 27 29 27 21 Temp:      
SpO2: 94% 94% 94% 95% Physical Exam: 
 
General:                    The patient is an elderly male in no acute respiratory distress. alert and oriented to place, time and person Head:                                   Normocephalic/atraumatic. Eyes:                                   palpebral pallor, no scleral icterus. ENT:                                    External auricular and nasal exam within normal limits. Mucous membranes are moist. 
Neck:                                   Supple, non-tender, no JVD. Lungs:                       decreased to auscultation bilaterally without wheezes or crackles. No respiratory distress or accessory muscle use. Heart:                                  Regular rate and rhythm, without murmurs, rubs, or gallops. Abdomen:                  Soft, non-tender, mildly distended with normoactive bowel sounds. Genitourinary:           No tenderness over the bladder or bilateral CVAs. Extremities:               Without clubbing, cyanosis, or edema. Skin:                                    Normal color, texture, and turgor. No rashes, lesions, or jaundice. Pulses:                      Radial and dorsalis pedis pulses present 2+ bilaterally. Capillary refill <2s. Neurologic:                CN II-XII grossly intact and symmetrical.  
                                            Moving all four extremities well with no focal deficits. Psychiatric:                Pleasant demeanor, appropriate affect. Alert and oriented x 3 Lab and data Recent Results (from the past 24 hour(s)) EKG, 12 LEAD, INITIAL Collection Time: 09/22/20 10:51 AM  
Result Value Ref Range Ventricular Rate 97 BPM  
 Atrial Rate 97 BPM  
 P-R Interval 144 ms QRS Duration 90 ms Q-T Interval 334 ms QTC Calculation (Bezet) 424 ms Calculated P Axis 72 degrees Calculated R Axis 46 degrees Calculated T Axis 43 degrees Diagnosis    
  !! AGE AND GENDER SPECIFIC ECG ANALYSIS !! Sinus rhythm with frequent Premature ventricular complexes and Fusion  
complexes Possible Left atrial enlargement Left ventricular hypertrophy Anteroseptal infarct , possibly acute Inferolateral injury pattern 
!!!! !!!! ACUTE MI !!!! !!!! 
Abnormal ECG No previous ECGs available Confirmed by MIGUEL IBARRA (), Adeel Nevarez ([id number]) on 9/23/2020 6:06:27 AM 
  
CBC WITH AUTOMATED DIFF Collection Time: 09/22/20 10:57 AM  
Result Value Ref Range WBC 13.3 (H) 4.3 - 11.1 K/uL  
 RBC 6.20 (H) 4.23 - 5.6 M/uL  
 HGB 17.9 (H) 13.6 - 17.2 g/dL HCT 52.1 (H) 41.1 - 50.3 % MCV 84.0 79.6 - 97.8 FL  
 MCH 28.9 26.1 - 32.9 PG  
 MCHC 34.4 31.4 - 35.0 g/dL  
 RDW 13.2 11.9 - 14.6 % PLATELET 713 826 - 921 K/uL MPV 12.0 9.4 - 12.3 FL ABSOLUTE NRBC 0.00 0.0 - 0.2 K/uL  
 DF AUTOMATED NEUTROPHILS 43 43 - 78 % LYMPHOCYTES 45 (H) 13 - 44 % MONOCYTES 9 4.0 - 12.0 % EOSINOPHILS 2 0.5 - 7.8 % BASOPHILS 1 0.0 - 2.0 % IMMATURE GRANULOCYTES 0 0.0 - 5.0 %  
 ABS. NEUTROPHILS 5.7 1.7 - 8.2 K/UL  
 ABS. LYMPHOCYTES 6.0 (H) 0.5 - 4.6 K/UL  
 ABS. MONOCYTES 1.2 0.1 - 1.3 K/UL  
 ABS. EOSINOPHILS 0.2 0.0 - 0.8 K/UL ABS. BASOPHILS 0.1 0.0 - 0.2 K/UL  
 ABS. IMM. GRANS. 0.1 0.0 - 0.5 K/UL METABOLIC PANEL, COMPREHENSIVE Collection Time: 09/22/20 10:57 AM  
Result Value Ref Range Sodium 139 136 - 145 mmol/L Potassium 3.5 3.5 - 5.1 mmol/L Chloride 103 98 - 107 mmol/L  
 CO2 21 21 - 32 mmol/L Anion gap 15 7 - 16 mmol/L Glucose 254 (H) 65 - 100 mg/dL BUN 12 8 - 23 MG/DL Creatinine 1.42 0.8 - 1.5 MG/DL  
 GFR est AA >60 >60 ml/min/1.73m2 GFR est non-AA 51 (L) >60 ml/min/1.73m2 Calcium 10.1 8.3 - 10.4 MG/DL Bilirubin, total 0.6 0.2 - 1.1 MG/DL  
 ALT (SGPT) 58 12 - 65 U/L  
 AST (SGOT) 37 15 - 37 U/L Alk. phosphatase 149 (H) 50 - 136 U/L Protein, total 9.1 (H) 6.3 - 8.2 g/dL Albumin 4.5 3.2 - 4.6 g/dL Globulin 4.6 (H) 2.3 - 3.5 g/dL A-G Ratio 1.0 (L) 1.2 - 3.5    
TROPONIN-HIGH SENSITIVITY Collection Time: 09/22/20 10:57 AM  
Result Value Ref Range Troponin-High Sensitivity 42.5 (H) 0 - 14 pg/mL EKG, 12 LEAD, INITIAL Collection Time: 09/22/20  4:31 PM  
Result Value Ref Range Ventricular Rate 85 BPM  
 Atrial Rate 85 BPM  
 P-R Interval 128 ms QRS Duration 84 ms Q-T Interval 362 ms QTC Calculation (Bezet) 430 ms Calculated P Axis 54 degrees Calculated R Axis -64 degrees Calculated T Axis 87 degrees Diagnosis    
  !!! Poor data quality, interpretation may be adversely affected 
evolving anterior MI - possibly acute Confirmed by Ramin Rose MD (), JONNY DE ([id number]) on 9/22/2020 7:00:08 PM 
  
POC ACTIVATED CLOTTING TIME Collection Time: 09/22/20  5:11 PM  
Result Value Ref Range Activated Clotting Time (POC) 120 70 - 128 SECS  
URINALYSIS W/ RFLX MICROSCOPIC Collection Time: 09/22/20  5:14 PM  
Result Value Ref Range Color RED Appearance CLOUDY Specific gravity 1.025 (H) 1.001 - 1.023    
 pH (UA) 5.0 5.0 - 9.0 Protein 100 (A) NEG mg/dL Glucose Negative mg/dL Ketone 40 (A) NEG mg/dL  Bilirubin LARGE (A) NEG    
 Blood LARGE (A) NEG Urobilinogen 1.0 0.2 - 1.0 EU/dL Nitrites Positive (A) NEG Leukocyte Esterase MODERATE (A) NEG    
 WBC 0-3 0 /hpf  
 RBC  0 /hpf Epithelial cells 0-3 0 /hpf Bacteria 0 0 /hpf Other observations RESULTS VERIFIED MANUALLY    
CBC WITH AUTOMATED DIFF Collection Time: 09/22/20  5:18 PM  
Result Value Ref Range WBC 19.9 (H) 4.3 - 11.1 K/uL  
 RBC 5.68 (H) 4.23 - 5.6 M/uL  
 HGB 16.4 13.6 - 17.2 g/dL HCT 49.2 41.1 - 50.3 % MCV 86.6 79.6 - 97.8 FL  
 MCH 28.9 26.1 - 32.9 PG  
 MCHC 33.3 31.4 - 35.0 g/dL  
 RDW 13.6 11.9 - 14.6 % PLATELET 714 582 - 672 K/uL MPV 11.1 9.4 - 12.3 FL ABSOLUTE NRBC 0.00 0.0 - 0.2 K/uL NEUTROPHILS 83 (H) 43 - 78 % LYMPHOCYTES 8 (L) 13 - 44 % MONOCYTES 8 4.0 - 12.0 % EOSINOPHILS 0 (L) 0.5 - 7.8 % BASOPHILS 0 0.0 - 2.0 % IMMATURE GRANULOCYTES 1 0.0 - 5.0 %  
 ABS. NEUTROPHILS 16.5 (H) 1.7 - 8.2 K/UL  
 ABS. LYMPHOCYTES 1.6 0.5 - 4.6 K/UL  
 ABS. MONOCYTES 1.6 (H) 0.1 - 1.3 K/UL  
 ABS. EOSINOPHILS 0.0 0.0 - 0.8 K/UL  
 ABS. BASOPHILS 0.0 0.0 - 0.2 K/UL  
 ABS. IMM. GRANS. 0.2 0.0 - 0.5 K/UL  
 DF AUTOMATED HEMOGLOBIN A1C WITH EAG Collection Time: 09/22/20  5:18 PM  
Result Value Ref Range Hemoglobin A1c 8.0 (H) 4.8 - 6.0 % Est. average glucose 183 mg/dL POC ACTIVATED CLOTTING TIME Collection Time: 09/22/20  6:05 PM  
Result Value Ref Range Activated Clotting Time (POC) 147 (H) 70 - 128 SECS  
CULTURE, URINE Collection Time: 09/22/20  6:37 PM  
 Specimen: Cath Urine URINE Result Value Ref Range Special Requests: NO SPECIAL REQUESTS Culture result:     
  NO GROWTH AFTER SHORT PERIOD OF INCUBATION. FURTHER RESULTS TO FOLLOW AFTER OVERNIGHT INCUBATION. POC ACTIVATED CLOTTING TIME Collection Time: 09/22/20  7:06 PM  
Result Value Ref Range Activated Clotting Time (POC) 147 (H) 70 - 128 SECS  
POC ACTIVATED CLOTTING TIME  Collection Time: 09/22/20  8:01 PM  
 Result Value Ref Range Activated Clotting Time (POC) 164 (H) 70 - 128 SECS  
POC ACTIVATED CLOTTING TIME Collection Time: 09/22/20  9:17 PM  
Result Value Ref Range Activated Clotting Time (POC) 164 (H) 70 - 128 SECS  
GLUCOSE, POC Collection Time: 09/22/20  9:19 PM  
Result Value Ref Range Glucose (POC) 406 (H) 65 - 100 mg/dL POC ACTIVATED CLOTTING TIME Collection Time: 09/22/20 11:19 PM  
Result Value Ref Range Activated Clotting Time (POC) 169 (H) 70 - 128 SECS  
POC ACTIVATED CLOTTING TIME Collection Time: 09/23/20 12:13 AM  
Result Value Ref Range Activated Clotting Time (POC) 175 (H) 70 - 128 SECS  
POC ACTIVATED CLOTTING TIME Collection Time: 09/23/20  1:36 AM  
Result Value Ref Range Activated Clotting Time (POC) 169 (H) 70 - 128 SECS  
CBC WITH AUTOMATED DIFF Collection Time: 09/23/20  2:56 AM  
Result Value Ref Range WBC 21.2 (H) 4.3 - 11.1 K/uL  
 RBC 5.13 4.23 - 5.6 M/uL  
 HGB 15.3 13.6 - 17.2 g/dL HCT 44.1 41.1 - 50.3 % MCV 86.0 79.6 - 97.8 FL  
 MCH 29.8 26.1 - 32.9 PG  
 MCHC 34.7 31.4 - 35.0 g/dL  
 RDW 14.2 11.9 - 14.6 % PLATELET 495 489 - 498 K/uL MPV 11.4 9.4 - 12.3 FL ABSOLUTE NRBC 0.00 0.0 - 0.2 K/uL  
 DF AUTOMATED NEUTROPHILS 79 (H) 43 - 78 % LYMPHOCYTES 9 (L) 13 - 44 % MONOCYTES 9 4.0 - 12.0 % EOSINOPHILS 1 0.5 - 7.8 % BASOPHILS 0 0.0 - 2.0 % IMMATURE GRANULOCYTES 1 0.0 - 5.0 %  
 ABS. NEUTROPHILS 16.8 (H) 1.7 - 8.2 K/UL  
 ABS. LYMPHOCYTES 2.0 0.5 - 4.6 K/UL  
 ABS. MONOCYTES 2.0 (H) 0.1 - 1.3 K/UL  
 ABS. EOSINOPHILS 0.1 0.0 - 0.8 K/UL  
 ABS. BASOPHILS 0.1 0.0 - 0.2 K/UL  
 ABS. IMM. GRANS. 0.3 0.0 - 0.5 K/UL  
POC ACTIVATED CLOTTING TIME Collection Time: 09/23/20  3:19 AM  
Result Value Ref Range Activated Clotting Time (POC) 169 (H) 70 - 128 SECS  
POC ACTIVATED CLOTTING TIME Collection Time: 09/23/20  4:23 AM  
Result Value Ref Range  Activated Clotting Time (POC) 169 (H) 70 - 128 SECS  
 POC ACTIVATED CLOTTING TIME Collection Time: 09/23/20  5:28 AM  
Result Value Ref Range Activated Clotting Time (POC) 158 (H) 70 - 839 SECS  
METABOLIC PANEL, BASIC Collection Time: 09/23/20  5:38 AM  
Result Value Ref Range Sodium 136 136 - 145 mmol/L Potassium 5.6 (H) 3.5 - 5.1 mmol/L Chloride 105 98 - 107 mmol/L  
 CO2 15 (L) 21 - 32 mmol/L Anion gap 16 7 - 16 mmol/L Glucose 454 (HH) 65 - 100 mg/dL BUN 31 (H) 8 - 23 MG/DL Creatinine 2.70 (H) 0.8 - 1.5 MG/DL  
 GFR est AA 29 (L) >60 ml/min/1.73m2 GFR est non-AA 24 (L) >60 ml/min/1.73m2 Calcium 7.8 (L) 8.3 - 10.4 MG/DL MAGNESIUM Collection Time: 09/23/20  5:38 AM  
Result Value Ref Range Magnesium 1.8 1.8 - 2.4 mg/dL LIPID PANEL Collection Time: 09/23/20  5:38 AM  
Result Value Ref Range LIPID PROFILE Cholesterol, total 132 <200 MG/DL Triglyceride 163 (H) 35 - 150 MG/DL  
 HDL Cholesterol 48 40 - 60 MG/DL  
 LDL, calculated 51.4 <100 MG/DL VLDL, calculated 32.6 (H) 6.0 - 23.0 MG/DL  
 CHOL/HDL Ratio 2.8    
TROPONIN-HIGH SENSITIVITY Collection Time: 09/23/20  5:38 AM  
Result Value Ref Range Troponin-High Sensitivity >25,000.0 () 0 - 14 pg/mL LACTIC ACID Collection Time: 09/23/20  5:38 AM  
Result Value Ref Range Lactic acid 10.0 (HH) 0.4 - 2.0 MMOL/L  
EKG, 12 LEAD, INITIAL Collection Time: 09/23/20  6:13 AM  
Result Value Ref Range Ventricular Rate 91 BPM  
 Atrial Rate 91 BPM  
 P-R Interval 140 ms QRS Duration 98 ms Q-T Interval 356 ms  
 QTC Calculation (Bezet) 437 ms Calculated P Axis 52 degrees Calculated R Axis 50 degrees Calculated T Axis 53 degrees Diagnosis Normal sinus rhythm Anterolateral infarct (cited on or before 23-SEP-2020) 
!!!! !!!! ACUTE MI !!!! !!!! 
Abnormal ECG When compared with ECG of 22-SEP-2020 16:31, No significant change was found Confirmed by MIGUEL IBARRA (), Tracey Busby ([id number]) on 9/23/2020 8:17:28 AM 
  
 POC ACTIVATED CLOTTING TIME Collection Time: 09/23/20  6:25 AM  
Result Value Ref Range Activated Clotting Time (POC) 164 (H) 70 - 128 SECS  
GLUCOSE, POC Collection Time: 09/23/20  7:50 AM  
Result Value Ref Range Glucose (POC) 470 (HH) 65 - 100 mg/dL POC ACTIVATED CLOTTING TIME Collection Time: 09/23/20  8:56 AM  
Result Value Ref Range Activated Clotting Time (POC) 158 (H) 70 - 128 SECS  
 
 
XR chest  
9/23/2020 IMPRESSION: 
  
1. Improved aeration of the lungs, consistent with resolving pulmonary edema. Imaging Current Outpatient Medications Medication Instructions  amLODIPine (NORVASC) 10 mg, Oral, DAILY  glyBURIDE-metFORMIN (GLUCOVANCE) 2.5-500 mg per tablet 2 Tabs, Oral, 2 TIMES DAILY WITH MEALS  
 hydroCHLOROthiazide (HYDRODIURIL) 25 mg, Oral, DAILY Current Facility-Administered Medications:  
  LORazepam (ATIVAN) injection 1 mg, 1 mg, IntraVENous, Q2H PRN, Celia Myles MD 
  insulin glargine (LANTUS) injection 10 Units, 10 Units, SubCUTAneous, DAILY, Troy Bass MD, 10 Units at 09/23/20 0904 
  0.9% sodium chloride infusion, 75 mL/hr, IntraVENous, CONTINUOUS, Chad Davis MD, Last Rate: 75 mL/hr at 09/22/20 1505, 75 mL/hr at 09/22/20 1505   sodium chloride (NS) flush 5-40 mL, 5-40 mL, IntraVENous, Q8H, Chad Davis MD, 10 mL at 09/23/20 5838   sodium chloride (NS) flush 5-40 mL, 5-40 mL, IntraVENous, PRN, Chad Davis MD 
  acetaminophen (TYLENOL) tablet 650 mg, 650 mg, Oral, Q4H PRN, Chad Davis MD 
  morphine injection 2 mg, 2 mg, IntraVENous, Q4H PRN, Chad Davis MD, 2 mg at 09/23/20 0149 
  nitroglycerin (NITROSTAT) tablet 0.4 mg, 0.4 mg, SubLINGual, Q5MIN PRN, Chad Davis MD 
  aspirin chewable tablet 81 mg, 81 mg, Oral, DAILY, Chad Davis MD, 81 mg at 09/23/20 0810 
  HYDROcodone-acetaminophen (NORCO) 5-325 mg per tablet 1 Tab, 1 Tab, Oral, Q4H PRN, Maria Luz Davis MD 
  ticagrelor [facility]) tablet 90 mg, 90 mg, Oral, Q12H, Maria Luz Davis MD, 90 mg at 09/23/20 0535 
  nitroglycerin (Tridil) 200 mcg/ml infusion, 0-20 mcg/min, IntraVENous, TITRATE, Maria Luz Davis MD, Last Rate: 4.5 mL/hr at 09/22/20 1611, 15 mcg/min at 09/22/20 1611 
  heparin (porcine) 25,000 units in 0.45% saline 500 ml infusion, 12-25 Units/kg/hr, IntraVENous, TITRATE, Maria Luz Davis MD, Last Rate: 14.7 mL/hr at 09/23/20 0900, 9 Units/kg/hr at 09/23/20 0900 
  heparin (porcine) 12,500 Units in dextrose 10% 1,000 mL for impella device, 4-20 mL/hr, IntraCATHeter- ARTerial, TITRATESusan Luci Pill, MD, Last Rate: 10.5 mL/hr at 09/22/20 1505, 10.5 mL/hr at 09/22/20 1505   heparinized saline 2 units/mL infusion, 3 Units/hr, IntraarTERial, CONTINUOUS, Maria Luz Davis MD, Stopped at 09/22/20 1315   heparin (porcine) 10,000 unit/mL injection 1,000-10,000 Units, 1,000-10,000 Units, IntraVENous, Multiple, Maria Luz Davis MD, 4,000 Units at 09/22/20 1134 
  amiodarone (CORDARONE) 450 mg in dextrose 5% 250 mL infusion, 1 mg/min, IntraVENous, TITRATE, Maria Luz Davis MD, Last Rate: 16.7 mL/hr at 09/22/20 1911, 0.5 mg/min at 09/22/20 1911 
  ondansetron (ZOFRAN) injection 4 mg, 4 mg, IntraVENous, Q4H PRN, Maria Luz Davis MD, 4 mg at 09/23/20 0745   hydrALAZINE (APRESOLINE) tablet 25 mg, 25 mg, Oral, QID, Maria Luz Davis MD, 25 mg at 09/23/20 4240   furosemide (LASIX) injection 40 mg, 40 mg, IntraVENous, Q12H, Maria Luz Davis MD, 40 mg at 09/23/20 0810 
  insulin lispro (HUMALOG) injection, , SubCUTAneous, AC&HS, Georgeanne Leventhal, MD, 10 Units at 09/23/20 7540   cefTRIAXone (ROCEPHIN) 1 g in 0.9% sodium chloride (MBP/ADV) 50 mL, 1 g, IntraVENous, Q24H, Georgeanne Leventhal, MD, Last Rate: 100 mL/hr at 09/22/20 1950, 1 g at 09/22/20 1950 Assessment:  
 
Hospital Problems  Never Reviewed Codes Class Noted POA STEMI (ST elevation myocardial infarction) ([facility] ICD-10-CM: I21.3 ICD-9-CM: 410.90  9/22/2020 Unknown Acute ST elevation myocardial infarction (STEMI) involving left anterior descending (LAD) coronary artery (HCC) ICD-10-CM: I21.02 
ICD-9-CM: 410.10  9/22/2020 Unknown Hypertension ICD-10-CM: I10 
ICD-9-CM: 401.9  9/22/2020 Unknown Diabetes mellitus type 2, controlled ([facility] ICD-10-CM: E11.9 ICD-9-CM: 250.00  9/22/2020 Unknown * (Principal) Acute cystitis without hematuria ICD-10-CM: N30.00 ICD-9-CM: 595.0  9/22/2020 Unknown Plan: STEMI  
S/P PTCA Being managed by cardiologist.  
 
Diabetes mellitus type 2 Monitor blood sugar. Cover with insulin sliding scale accordingly. Will stop PO medication for now due to need for easier control and adjustment. Will add Lantus. MAURICIO S/P STEMI and left heart catheterization With hyperkalemia and metabolic acidosis Ask nephrology to see. May need NaHCO3 IV drip. High risk for decompensation and may need dialysis. Patient has hematuria On heparin IV drip. On Empiric IV antibiotic, Ceftriaxone for presumed UTI for now. Will follow culture result. I have discussed the plan of care with patient and family member, daughter,  at bedside. DVT prophylaxis : heparin IV drip already. Signed By: Manuel Crowell MD   
 September 23, 2020

## 2020-09-24 NOTE — PROGRESS NOTES
FRANDY working with pt's family and has sent application for RC to [facility]. Pt may qualify for RC or emergency RC. He has been resident for 11 years, but not a citizen.  CM will continue to follow for any assist.

## 2020-09-24 NOTE — PROGRESS NOTES
[facility] PROGRESS NOTE Follow up for: 
 
Subjective:  
Patient seen and examined and discussed with family at bedside with help of [place] . ROS: 
Gen - no fever, no chills, appetite okay CV - no chest pain, no orthopnea Lung - no shortness of breath, no cough Ext - no edema Objective:  
Exam: 
Vitals:  
 09/24/20 3900 09/24/20 0559 09/24/20 3610 09/24/20 0463 BP: (!) 134/96 127/88 (!) 142/97 (!) 140/95 Pulse:  96 96 98 Resp:  26 20 Temp:   98.4 °F (36.9 °C) SpO2: 91% 93% 95% Intake/Output Summary (Last 24 hours) at 9/24/2020 9096 Last data filed at 9/24/2020 7076 Gross per 24 hour Intake 2429.43 ml Output 1325 ml Net 1104.43 ml  
 
 
Current Facility-Administered Medications Medication Dose Route Frequency  lip protectant (BLISTEX) ointment 1 Each  1 Each Topical PRN  
 furosemide (LASIX) injection 80 mg  80 mg IntraVENous Q12H  furosemide (LASIX) injection 40 mg  40 mg IntraVENous ONCE  
 LORazepam (ATIVAN) injection 1 mg  1 mg IntraVENous Q2H PRN  
 atorvastatin (LIPITOR) tablet 80 mg  80 mg Oral DAILY  insulin glargine (LANTUS) injection 20 Units  20 Units SubCUTAneous DAILY  labetaloL (NORMODYNE;TRANDATE) injection 20 mg  20 mg IntraVENous Q6H PRN  
 hydrALAZINE (APRESOLINE) tablet 50 mg  50 mg Oral QID  carvediloL (COREG) tablet 3.125 mg  3.125 mg Oral BID WITH MEALS  sodium chloride (NS) flush 5-40 mL  5-40 mL IntraVENous Q8H  
 sodium chloride (NS) flush 5-40 mL  5-40 mL IntraVENous PRN  
 acetaminophen (TYLENOL) tablet 650 mg  650 mg Oral Q4H PRN  
 morphine injection 2 mg  2 mg IntraVENous Q4H PRN  
 nitroglycerin (NITROSTAT) tablet 0.4 mg  0.4 mg SubLINGual Q5MIN PRN  
 aspirin chewable tablet 81 mg  81 mg Oral DAILY  HYDROcodone-acetaminophen (NORCO) 5-325 mg per tablet 1 Tab  1 Tab Oral Q4H PRN  
 ticagrelor (BRILINTA) tablet 90 mg  90 mg Oral Q12H  nitroglycerin (Tridil) 200 mcg/ml infusion  0-20 mcg/min IntraVENous TITRATE  heparin (porcine) 25,000 units in 0.45% saline 500 ml infusion  12-25 Units/kg/hr IntraVENous TITRATE  heparin (porcine) 12,500 Units in dextrose 10% 1,000 mL for impella device  4-20 mL/hr IntraCATHeter- ARTerial TITRATE  heparinized saline 2 units/mL infusion  3 Units/hr IntraarTERial CONTINUOUS  
 heparin (porcine) 10,000 unit/mL injection 1,000-10,000 Units  1,000-10,000 Units IntraVENous Multiple  amiodarone (CORDARONE) 450 mg in dextrose 5% 250 mL infusion  1 mg/min IntraVENous TITRATE  ondansetron (ZOFRAN) injection 4 mg  4 mg IntraVENous Q4H PRN  
 insulin lispro (HUMALOG) injection   SubCUTAneous AC&HS  cefTRIAXone (ROCEPHIN) 1 g in 0.9% sodium chloride (MBP/ADV) 50 mL  1 g IntraVENous Q24H EXAM 
GEN - sleepy but arousable CV - S1, S2, RRR, no rub, murmur, or gallop Lung - clear to auscultation bilaterally Abd - soft, nontender, BS present Ext - no edema Recent Labs  
  09/24/20 
0256 09/23/20 
1709 09/23/20 
0256 WBC 25.2* 23.6* 21.2* HGB 12.7* 13.5* 15.3 HCT 36.9* 38.7* 44.1  222 293 Recent Labs  
  09/24/20 
0256 09/23/20 
1125 09/23/20 
0538 09/22/20 
1057   --  136 139  
K 4.2 5.1 5.6* 3.5   --  105 103 CO2 22  --  15* 21 BUN 60*  --  31* 12  
CREA 5.01*  --  2.70* 1.42  
CA 8.7  --  7.8* 10.1 *  --  454* 254* MG 2.1  --  1.8  -- Assessment and Plan:  
1) MAURICIO-  Likely combination of ATN from ishcemia and contrast injury. Continues to make urine, but poor clearance and pulmonary edema on CXR. Needs dialysis. WIll have IR place a temp line today. 2) Hyperkalemia-  better 3) Lactic Acidosis- actually improved today. 4) STEMI- s/p LHC. Heparin gtt. Impella 5)Pulmonary edema-  Lasix, UF with HD. 6) Gross hematuria- likely leon trauma with Heparin gtt. Monitoring.  
 
Christiano Sumner MD

## 2020-09-24 NOTE — PROGRESS NOTES
[facility] CARDIOLOGY PROGRESS NOTE 
 
9/24/2020 7:53 AM 
 
Admit Date: 9/22/2020 Subjective:  
Stable overnight from CV standpoint with BP improving with addition of afterload reduction/BB therapy but  Intermittently confused and more SOB late yesterday, gave higher dose lasix dose last night but not much more UOP. Renal function considerably worse, despite impella at P-9 with 3.5L output. Respiratory status stable but tenuous. Needs HD most likely, renal following. Objective:  
  
Vitals:  
 09/24/20 0530 09/24/20 0544 09/24/20 0559 09/24/20 6169 BP: (!) 133/93 (!) 134/96 127/88 (!) 142/97 Pulse: 93  96 96 Resp:   26 20 Temp:    98.4 °F (36.9 °C) SpO2: 93% 91% 93% 95% Physical Exam: 
Neck- supple, ++ JVD 
CV- regular rate and rhythm no MRG Lung- clear bilaterally anteriorly, decreased laterally Abd- soft, nontender, nondistended Ext- no edema, groin access sites CDI Skin- warm and dry Data Review:  
Recent Labs  
  09/24/20 
0256 09/23/20 
1709 09/23/20 
1125 09/23/20 
5851   --   --  136  
K 4.2  --  5.1 5.6*  
MG 2.1  --   --  1.8 BUN 60*  --   --  31* CREA 5.01*  --   --  2.70* *  --   --  454* WBC 25.2* 23.6*  --   --   
HGB 12.7* 13.5*  --   --   
HCT 36.9* 38.7*  --   --   
 222  --   --   
CHOL  --   --   --  132 TRIGL  --   --   --  163* HDL  --   --   --  48 Assessment and Plan:  
 
 Acute ST elevation myocardial infarction (STEMI) involving left anterior descending (LAD) coronary artery (HCC) (9/22/2020)- stable without angina or clinical CHF, impella functioning well, continue heparin per protocol.  
  
Principal Problem: 
  Acute cystitis with hematuria (9/22/2020)- renal following, continue ABX   
  
Active Problems: 
  Acute systolic CHF- stable with impella, add coreg, titrate as tolerated. No ACE/ARB with acute renal failure. Titrate hydralazine and NTG as tolerated but avoid hypotension with ARF. LV apical thrombus- mural apical thrombus, impella pigtail not close to apex. Needs impella for now, continue heparin gtt per protocol.  
  
  Acute renal failure- likely hypoperfusion/contrast combo related- consult nephrology, follow hematuria, recheck BMP in AM 
  
  Hypertension (9/22/2020)- coreg 3.125mg BID now, afterload reduction as tolerated.    
  
  Diabetes mellitus type 2, controlled ([facility] [address] (9/22/2020)- per ICU protocol 
  
  
Renal function worse BP stable and tolerating afterload reduction, no ACE/ARB with acute renal failure Lactate trending down but WBC higher, more SOB today Leave impella in place, continue ABX, HD per renal as needed, recheck labs in AM 
Remains critically ill. Leopoldo Kocher, MD 
[facility] Cardiology Pager [phone]

## 2020-09-24 NOTE — PROGRESS NOTES
Hospital  rounded over-the-phone, discussing language services with RN Tracie Langford. Please consult the [facility] on-call calendar for [place]  to find  availability and phone number. Thank you,  
 
Kavitha Claros CERTIFIED HEALTHCARE INTERPRETERTM ([place]) Language Services Department  
[address] 
[phone] (phone)

## 2020-09-24 NOTE — DIALYSIS
Hemodialysis treatment initiated using right IJ catheter by Chris Oliveira.  VS stable. Machine settings per MD order. Will monitor during treatment.

## 2020-09-24 NOTE — PROGRESS NOTES
Bedside, Verbal and Written shift change report given to [phone] (oncoming nurse) by Ml Oliva (offgoing nurse). Report included the following information SBAR, Kardex, ED Summary, Procedure Summary, Intake/Output, MAR, Accordion, Recent Results, Med Rec Status, Cardiac Rhythm NSR and Alarm Parameters . Impella and [place] groin checks complete. Impella slightly out of position, impella rep notified. Will redress and tighten clamp at hub.

## 2020-09-24 NOTE — PROCEDURES
Asked to place dialysis line due to pt on Impella and unable to travel. Trialysis Line Placement A 20 cm Trialysis catheter was placed via the R IJV with US guidance. No air aspirated. All ports cleared of air and flushed with NSS. Line sewn into place using ties molded into catheter. CXR is pending.  
 
Ani Red MD

## 2020-09-24 NOTE — PROGRESS NOTES
present via phone for an encounter with Nephrologist Dr. Bertrand Yoon Thank you, [facility] INTERPRETERTM Language Services Department 
[address]  [facility] 
[phone] (phone)

## 2020-09-24 NOTE — DIALYSIS
Hemodialysis treatment finished. Patient Tomas Perkins had low pressure. BP would not take. Gave 300ml BP able to be read. Ended treatment 30 min early. Patient family at bedside   BP 95/69   P 89     
 
 1.7 Kgs removed. Flushed both ports with 10 mL of NS.  CVC dressing clean, dry, and intact, tego caps intact, Patient remains in 3109.

## 2020-09-24 NOTE — ROUTINE PROCESS
present via phone for signing of consents and procedure with Dr. Jacquelynn Angelucci. [facility] [address]  [employer] Language Services Department 
[address]  [facility] 
[phone] (phone)

## 2020-09-24 NOTE — PROGRESS NOTES
Progress Note Patient: Leonora Willis MRN: [id number]  SSN: [id number] YOB: 1940  Age: 79 y.o. Sex: male Admit Date: 9/22/2020 LOS: 2 days Subjective:  
 
Leonora Willis is a 79 y.o. male who is being seen for consultation for DM management, MAURICIO and hyperkalemia.  
  
Patient has DM and hypertension. He speaks only [place] and daughter is on the phone helping with communication.  
  
Patient has been admitted on 9/22/2020 due to chest pain. He had STEMI and underwent PTCA.  
  
\" PROCEDURES PERFORMED: 1.  Left heart cath, selective coronary arteriography, and left ventriculogram via the right radial approach. 2.  PCI and stenting of the LAD. 3.  IVUS of LAD after stenting. 4.  Impella CP LVAD placement via the right common femoral artery. 5.  Placement of a continuous cardiac output [place] catheter via the left femoral vein. \"  
  
His BS is elevated. His creatinine is up after left heart catheterization and he has hyperkalemia and metabolic acidosis as well. Nephrology is consulted. Patient has made urine with Lasix, however continues to have rising BUN, creatinine. He is planned for HD today. Objective:  
 
Vitals:  
 09/24/20 0559 09/24/20 4449 09/24/20 0829 09/24/20 1046 BP: 127/88 (!) 142/97 (!) 140/95 (!) 134/95 Pulse: 96 96 98 96 Resp: 26 20 Temp:  98.4 °F (36.9 °C) SpO2: 93% 95% Intake and Output: 
Current Shift: 09/24 0701 - 09/24 1900 In: -  
Out: 795 [SWXLZ:261] Last three shifts: 09/22 1901 - 09/24 0700 In: 4071.4 [P.O.:360; I.V.:3711.4] Out: 2385 [Urine:2385] Physical Exam:  
 
General:                    The patient is an elderly male in mild acute respiratory distress. alert and oriented to place, time and person. Lethargic. ZKCU:                                   FZZUKZAKOTFGS/CASYZGDAGD. Eyes:                                   palpebral pallor, no scleral icterus. ENT:                                    External auricular and nasal exam within normal limits.                                             IQAGHG membranes are moist. 
Neck:                                   Supple, non-tender, no JVD. Lungs:                       decreased to auscultation bilaterally without wheezes or crackles.                                             No respiratory distress or accessory muscle use. Heart:                                  Regular rate and rhythm, without murmurs, rubs, or gallops. Abdomen:                  Soft, non-tender, mildly distended with normoactive bowel sounds. Genitourinary:           No tenderness over the bladder or bilateral CVAs. Extremities:               Without clubbing, cyanosis, + edema. Skin:                                    Normal color, texture, and turgor. No rashes, lesions, or jaundice. Pulses:                      Radial and dorsalis pedis pulses present 2+ bilaterally.  
                                            Capillary refill <2s. Neurologic:                CN II-XII grossly intact and symmetrical.  
                                            Moving all four extremities well with no focal deficits. Psychiatric:                Pleasant demeanor, appropriate affect. Alert and oriented x 3 Lab/Data Review: 
 
Recent Results (from the past 24 hour(s)) POC ACTIVATED CLOTTING TIME Collection Time: 09/23/20  3:14 PM  
Result Value Ref Range Activated Clotting Time (POC) 158 (H) 70 - 128 SECS  
GLUCOSE, POC Collection Time: 09/23/20  4:48 PM  
Result Value Ref Range Glucose (POC) 410 (H) 65 - 100 mg/dL CBC WITH AUTOMATED DIFF Collection Time: 09/23/20  5:09 PM  
Result Value Ref Range WBC 23.6 (H) 4.3 - 11.1 K/uL  
 RBC 4.57 4.23 - 5.6 M/uL  
 HGB 13.5 (L) 13.6 - 17.2 g/dL HCT 38.7 (L) 41.1 - 50.3 % MCV 84.7 79.6 - 97.8 FL  
 MCH 29.5 26.1 - 32.9 PG  
 MCHC 34.9 31.4 - 35.0 g/dL RDW 14.4 11.9 - 14.6 % PLATELET 093 208 - 544 K/uL MPV 11.9 9.4 - 12.3 FL ABSOLUTE NRBC 0.00 0.0 - 0.2 K/uL  
 DF AUTOMATED NEUTROPHILS 79 (H) 43 - 78 % LYMPHOCYTES 10 (L) 13 - 44 % MONOCYTES 11 4.0 - 12.0 % EOSINOPHILS 0 (L) 0.5 - 7.8 % BASOPHILS 0 0.0 - 2.0 % IMMATURE GRANULOCYTES 1 0.0 - 5.0 %  
 ABS. NEUTROPHILS 18.6 (H) 1.7 - 8.2 K/UL  
 ABS. LYMPHOCYTES 2.3 0.5 - 4.6 K/UL  
 ABS. MONOCYTES 2.6 (H) 0.1 - 1.3 K/UL  
 ABS. EOSINOPHILS 0.0 0.0 - 0.8 K/UL  
 ABS. BASOPHILS 0.1 0.0 - 0.2 K/UL  
 ABS. IMM. GRANS. 0.2 0.0 - 0.5 K/UL  
POC ACTIVATED CLOTTING TIME Collection Time: 09/23/20  7:04 PM  
Result Value Ref Range Activated Clotting Time (POC) 147 (H) 70 - 128 SECS  
POC ACTIVATED CLOTTING TIME Collection Time: 09/23/20  8:06 PM  
Result Value Ref Range Activated Clotting Time (POC) 153 (H) 70 - 128 SECS  
POC ACTIVATED CLOTTING TIME Collection Time: 09/23/20  9:09 PM  
Result Value Ref Range Activated Clotting Time (POC) 164 (H) 70 - 128 SECS  
GLUCOSE, POC Collection Time: 09/23/20 10:42 PM  
Result Value Ref Range Glucose (POC) 378 (H) 65 - 100 mg/dL POC ACTIVATED CLOTTING TIME Collection Time: 09/23/20 10:44 PM  
Result Value Ref Range Activated Clotting Time (POC) 169 (H) 70 - 128 SECS  
POC ACTIVATED CLOTTING TIME Collection Time: 09/24/20 12:23 AM  
Result Value Ref Range Activated Clotting Time (POC) 158 (H) 70 - 128 SECS  
POC ACTIVATED CLOTTING TIME Collection Time: 09/24/20  1:14 AM  
Result Value Ref Range Activated Clotting Time (POC) 164 (H) 70 - 128 SECS  
CBC WITH AUTOMATED DIFF Collection Time: 09/24/20  2:56 AM  
Result Value Ref Range WBC 25.2 (H) 4.3 - 11.1 K/uL  
 RBC 4.39 4.23 - 5.6 M/uL  
 HGB 12.7 (L) 13.6 - 17.2 g/dL HCT 36.9 (L) 41.1 - 50.3 % MCV 84.1 79.6 - 97.8 FL  
 MCH 28.9 26.1 - 32.9 PG  
 MCHC 34.4 31.4 - 35.0 g/dL  
 RDW 14.5 11.9 - 14.6 % PLATELET 773 609 - 620 K/uL MPV 12.0 9.4 - 12.3 FL ABSOLUTE NRBC 0.02 0.0 - 0.2 K/uL  
 DF AUTOMATED NEUTROPHILS 79 (H) 43 - 78 % LYMPHOCYTES 11 (L) 13 - 44 % MONOCYTES 9 4.0 - 12.0 % EOSINOPHILS 0 (L) 0.5 - 7.8 % BASOPHILS 0 0.0 - 2.0 % IMMATURE GRANULOCYTES 1 0.0 - 5.0 %  
 ABS. NEUTROPHILS 19.9 (H) 1.7 - 8.2 K/UL  
 ABS. LYMPHOCYTES 2.8 0.5 - 4.6 K/UL  
 ABS. MONOCYTES 2.2 (H) 0.1 - 1.3 K/UL  
 ABS. EOSINOPHILS 0.0 0.0 - 0.8 K/UL  
 ABS. BASOPHILS 0.1 0.0 - 0.2 K/UL  
 ABS. IMM. GRANS. 0.4 0.0 - 0.5 K/UL METABOLIC PANEL, BASIC Collection Time: 09/24/20  2:56 AM  
Result Value Ref Range Sodium 136 136 - 145 mmol/L Potassium 4.2 3.5 - 5.1 mmol/L Chloride 103 98 - 107 mmol/L  
 CO2 22 21 - 32 mmol/L Anion gap 11 7 - 16 mmol/L Glucose 283 (H) 65 - 100 mg/dL BUN 60 (H) 8 - 23 MG/DL Creatinine 5.01 (H) 0.8 - 1.5 MG/DL  
 GFR est AA 14 (L) >60 ml/min/1.73m2 GFR est non-AA 12 (L) >60 ml/min/1.73m2 Calcium 8.7 8.3 - 10.4 MG/DL MAGNESIUM Collection Time: 09/24/20  2:56 AM  
Result Value Ref Range Magnesium 2.1 1.8 - 2.4 mg/dL LACTIC ACID Collection Time: 09/24/20  2:56 AM  
Result Value Ref Range Lactic acid 3.4 (HH) 0.4 - 2.0 MMOL/L  
POC ACTIVATED CLOTTING TIME Collection Time: 09/24/20  3:03 AM  
Result Value Ref Range Activated Clotting Time (POC) 153 (H) 70 - 128 SECS  
POC ACTIVATED CLOTTING TIME Collection Time: 09/24/20  5:07 AM  
Result Value Ref Range Activated Clotting Time (POC) 158 (H) 70 - 128 SECS  
GLUCOSE, POC Collection Time: 09/24/20  8:02 AM  
Result Value Ref Range Glucose (POC) 251 (H) 65 - 100 mg/dL POC ACTIVATED CLOTTING TIME Collection Time: 09/24/20  9:21 AM  
Result Value Ref Range Activated Clotting Time (POC) 147 (H) 70 - 128 SECS  
GLUCOSE, POC Collection Time: 09/24/20 10:48 AM  
Result Value Ref Range Glucose (POC) 226 (H) 65 - 100 mg/dL XR chest  
9/23/2020 IMPRESSION: 
  
 1. Improved aeration of the lungs, consistent with resolving pulmonary edema. XR chest  
9/24/2020 IMPRESSION: 
  
1. Progression of bilateral interstitial densities, likely pulmonary edema. Current Facility-Administered Medications:  
  lip protectant (BLISTEX) ointment 1 Each, 1 Each, Topical, PRN, Moisés Rachel MD 
  furosemide (LASIX) injection 80 mg, 80 mg, IntraVENous, Q12H, Angelika Nunez MD 
  LORazepam (ATIVAN) injection 1 mg, 1 mg, IntraVENous, Q2H PRN, Tiera Myles MD, 1 mg at 09/24/20 1230 
  atorvastatin (LIPITOR) tablet 80 mg, 80 mg, Oral, DAILY, Tiera Myles MD, 80 mg at 09/24/20 0829 
  insulin glargine (LANTUS) injection 20 Units, 20 Units, SubCUTAneous, DAILY, Troy Bass MD, 20 Units at 09/24/20 0829 
  labetaloL (NORMODYNE;TRANDATE) injection 20 mg, 20 mg, IntraVENous, Q6H PRN, Tiera Myles MD 
  hydrALAZINE (APRESOLINE) tablet 50 mg, 50 mg, Oral, QID, Davis Davis MD, 50 mg at 09/24/20 6237   carvediloL (COREG) tablet 3.125 mg, 3.125 mg, Oral, BID WITH MEALS, Davis Davis MD, 3.125 mg at 09/24/20 0829 
  sodium chloride (NS) flush 5-40 mL, 5-40 mL, IntraVENous, Q8H, Davis Davis MD, 10 mL at 09/24/20 0507   sodium chloride (NS) flush 5-40 mL, 5-40 mL, IntraVENous, PRN, Davis Davis MD 
  acetaminophen (TYLENOL) tablet 650 mg, 650 mg, Oral, Q4H PRN, Davis Davis MD 
  morphine injection 2 mg, 2 mg, IntraVENous, Q4H PRN, Davis Davis MD, 2 mg at 09/24/20 1230 
  nitroglycerin (NITROSTAT) tablet 0.4 mg, 0.4 mg, SubLINGual, Q5MIN PRN, Davis Davis MD 
  aspirin chewable tablet 81 mg, 81 mg, Oral, DAILY, Davis Davis MD, 81 mg at 09/24/20 0829 
  HYDROcodone-acetaminophen (NORCO) 5-325 mg per tablet 1 Tab, 1 Tab, Oral, Q4H PRN, Davis Davis MD, 1 Tab at 09/23/20 2035   ticagrelor (BRILINTA) tablet 90 mg, 90 mg, Oral, Q12H, Susan Kendy Sesay MD, 90 mg at 09/24/20 0506 
  nitroglycerin (Tridil) 200 mcg/ml infusion, 0-20 mcg/min, IntraVENous, TITRATE, Kendy Davis MD, Last Rate: 6 mL/hr at 09/23/20 1849, 20 mcg/min at 09/23/20 1849 
  heparin (porcine) 25,000 units in 0.45% saline 500 ml infusion, 12-25 Units/kg/hr, IntraVENous, TITRATE, Kendy Davis MD, Stopped at 09/24/20 1035   heparin (porcine) 12,500 Units in dextrose 10% 1,000 mL for impella device, 4-20 mL/hr, IntraCATHeter- ARTerial, TITRATE, Kendy Davis MD, Last Rate: 10.3 mL/hr at 09/23/20 1423, 10.3 mL/hr at 09/23/20 1423   heparinized saline 2 units/mL infusion, 3 Units/hr, IntraarTERial, CONTINUOUS, Kendy Davis MD, Stopped at 09/22/20 1315   heparin (porcine) 10,000 unit/mL injection 1,000-10,000 Units, 1,000-10,000 Units, IntraVENous, Multiple, Kendy Davis MD, 4,000 Units at 09/22/20 1134 
  amiodarone (CORDARONE) 450 mg in dextrose 5% 250 mL infusion, 1 mg/min, IntraVENous, TITRATE, Kendy Davis MD, Last Rate: 16.7 mL/hr at 09/24/20 0448, 0.5 mg/min at 09/24/20 0448   ondansetron (ZOFRAN) injection 4 mg, 4 mg, IntraVENous, Q4H PRN, Kendy Davis MD, 4 mg at 09/23/20 0745   insulin lispro (HUMALOG) injection, , SubCUTAneous, AC&HS, Edison Rey MD, 6 Units at 09/24/20 5299   cefTRIAXone (ROCEPHIN) 1 g in 0.9% sodium chloride (MBP/ADV) 50 mL, 1 g, IntraVENous, Q24H, Edison Rey MD, Last Rate: 100 mL/hr at 09/23/20 1806, 1 g at 09/23/20 1806 Assessment:  
 
Principal Problem: 
  Acute cystitis without hematuria (9/22/2020) Active Problems: STEMI (ST elevation myocardial infarction) ([facility] [address] (9/22/2020) Acute ST elevation myocardial infarction (STEMI) involving left anterior descending (LAD) coronary artery ([facility] [address] (9/22/2020) Hypertension (9/22/2020) Diabetes mellitus type 2, controlled ([facility] [address] (9/22/2020) Acute renal failure (ARF) ([facility] [facility] (9/23/2020) Plan: STEMI  
 S/P PTCA On heparin, Amiodarone MAURICIO S/P PTCA, left heart catheterization Pulmonary edema Fluid overload Uremia For HD today Diabetes mellitus type 2 Monitor blood sugar. Cover with insulin sliding scale accordingly. Lantus is increased Hypertension Monitor blood pressure and manage accordingly. BP is acceptable now. I have discussed the plan of care with patient and spouse at bedside. DVT prophylaxis : heparin IV already Signed By: Tanna Elaine MD   
 September 24, 2020

## 2020-09-25 PROBLEM — I21.3 STEMI (ST ELEVATION MYOCARDIAL INFARCTION) (HCC): Status: RESOLVED | Noted: 2020-01-01 | Resolved: 2020-01-01

## 2020-09-25 PROBLEM — R41.0 DELIRIUM: Status: ACTIVE | Noted: 2020-01-01

## 2020-09-25 PROBLEM — I47.29 NSVT (NONSUSTAINED VENTRICULAR TACHYCARDIA): Status: ACTIVE | Noted: 2020-01-01

## 2020-09-25 PROBLEM — R57.0 CARDIOGENIC SHOCK (HCC): Status: ACTIVE | Noted: 2020-01-01

## 2020-09-25 NOTE — PROGRESS NOTES
Discussed nitro gtt with cardiology NP. Nitro ordered for chest pain. Patient no longer with chest pain and pressures have been marginal since dialysis this afternoon. Okay'd to turn off nitro gtt at this time.

## 2020-09-25 NOTE — PROGRESS NOTES
[facility] CARDIOLOGY PROGRESS NOTE 
      
 
9/25/2020 8:10 AM 
 
Admit Date: 9/22/2020 Subjective:  
Patient remains in ICU. Sedated and in restraints due to confusion. BP lower after dialysis. Limited urine output. In sinus rhythm without arrhythmias. Impella in place providing good support. ROS: 
Unable to obtain due to sedation. Objective:  
  
Vitals:  
 09/25/20 0867 09/25/20 0714 09/25/20 0730 09/25/20 6151 BP: 112/71 103/72 104/67 108/69 Pulse: 86 84 84 84 Resp: (!) 38 (!) 32 (!) 35 20 Temp:      
SpO2: 95% 96% 93% 92% Physical Exam: 
General-sedated  male in NAD Neck- supple, no JVD 
CV- regular rate and rhythm no MRG Lung- clear bilaterally Abd- soft, nontender, nondistended Ext- no edema bilaterally. Skin- warm and dry Psychiatric:  Sedated. Neurologic:  Sedated. Data Review:  
Labs:  
Recent Labs  
  09/25/20 
0405 09/25/20 
0404 09/24/20 
1551 09/24/20 
0256  09/23/20 
3117 NA  --  137  --  136  --  136 K  --  4.0  --  4.2   < > 5.6*  
MG  --  2.3  --  2.1  --  1.8 BUN  --  72*  --  60*  --  31* CREA  --  6.49*  --  5.01*  --  2.70* GLU  --  205*  --  283*  --  454* WBC 21.6*  --  22.8* 25.2*   < >  --   
HGB 11.1*  --  11.6* 12.7*   < >  --   
HCT 31.7*  --  32.2* 36.9*   < >  --   
  --  177 198   < >  --   
TRIGL  --   --   --   --   --  163* HDL  --   --   --   --   --  48  
 < > = values in this interval not displayed. No results found for: OTONIEL Greene TELEMETRY:  Sinus rhythm. Assessment/Plan:  
 
Principal Problem: 
  Acute cystitis without hematuria (9/22/2020) Conitnue Rocephin. Active Problems: 
  Acute ST elevation myocardial infarction (STEMI) involving left anterior descending (LAD) coronary artery ([facility] [address] (9/22/2020) S/P PCI of prox to mid LAD. ON ASA and Brilinta. Hypertension (9/22/2020) Decrease Hydralazine to 25 TID. Diabetes mellitus type 2, controlled ([facility] (9/22/2020) Continue insulin Acute renal failure (ARF) ([facility] (9/23/2020) Appreciate nephrology assistance. COntinue dialysis Cardiogenic shock ([facility] [facility] (9/25/2020) Impella in place. Will need to continue support. Check LActic acid today. Conitnue IV heparin. Recheck limited echo today. Delirium (9/25/2020) Complicates management. NSVT (nonsustained ventricular tachycardia) ([facility] [address] (9/25/2020) Occurred in cath lab. No recurrence. Stop amiodarone. Apical thrombus Seen on prior echo. Unable to remove impella. Continue IV heparin. Check echo.   
 
 
 
 
 
 
Jeane Elizondo MD 
9/25/2020 8:10 AM

## 2020-09-25 NOTE — PROGRESS NOTES
Discussed minimal UOP with nephrology MD. Less than 10cc UOP since 1600. Pressures have also been soft, but improved since turning off nitro gtt. Orders received to go ahead and give ordered 80mg dose of lasix. Patient to receive dialysis again tomorrow, nephro will reassess lasix order then.

## 2020-09-25 NOTE — INTERDISCIPLINARY ROUNDS
Interdisciplinary team rounds were held 9/25/2020 with the following team members:Care Management, Nursing, Nutrition, Pastoral Care, Pharmacy, Physical Therapy and Physician and the patient. Plan of care discussed. See clinical pathway and/or care plan for interventions and desired outcomes.

## 2020-09-25 NOTE — PROGRESS NOTES
[facility] PROGRESS NOTE Follow up for: 
 
Subjective:  
Patient seen and examined on SLED today. Sleeping. Difficult to arouse. Spoke with family with help of the . ROS:  UTO Objective:  
Exam: 
Vitals:  
 09/25/20 0844 09/25/20 0853 09/25/20 1000 09/25/20 1003 BP: 109/76 119/69  117/75 Pulse: 85 85 85 84 Resp: (!) 34   (!) 36 Temp:      
SpO2: 94%   95% Intake/Output Summary (Last 24 hours) at 9/25/2020 1024 Last data filed at 9/25/2020 1000 Gross per 24 hour Intake 1710.24 ml Output 2847 ml Net -1136.76 ml  
 
 
Current Facility-Administered Medications Medication Dose Route Frequency  hydrALAZINE (APRESOLINE) tablet 25 mg  25 mg Oral TID  lip protectant (BLISTEX) ointment 1 Each  1 Each Topical PRN  
 furosemide (LASIX) injection 80 mg  80 mg IntraVENous Q12H  
 LORazepam (ATIVAN) injection 1 mg  1 mg IntraVENous Q2H PRN  
 atorvastatin (LIPITOR) tablet 80 mg  80 mg Oral DAILY  insulin glargine (LANTUS) injection 20 Units  20 Units SubCUTAneous DAILY  labetaloL (NORMODYNE;TRANDATE) injection 20 mg  20 mg IntraVENous Q6H PRN  
 carvediloL (COREG) tablet 3.125 mg  3.125 mg Oral BID WITH MEALS  sodium chloride (NS) flush 5-40 mL  5-40 mL IntraVENous Q8H  
 sodium chloride (NS) flush 5-40 mL  5-40 mL IntraVENous PRN  
 acetaminophen (TYLENOL) tablet 650 mg  650 mg Oral Q4H PRN  
 morphine injection 2 mg  2 mg IntraVENous Q4H PRN  
 nitroglycerin (NITROSTAT) tablet 0.4 mg  0.4 mg SubLINGual Q5MIN PRN  
 aspirin chewable tablet 81 mg  81 mg Oral DAILY  HYDROcodone-acetaminophen (NORCO) 5-325 mg per tablet 1 Tab  1 Tab Oral Q4H PRN  
 ticagrelor (BRILINTA) tablet 90 mg  90 mg Oral Q12H  
 heparin (porcine) 25,000 units in 0.45% saline 500 ml infusion  12-25 Units/kg/hr IntraVENous TITRATE  heparin (porcine) 12,500 Units in dextrose 10% 1,000 mL for impella device  4-20 mL/hr IntraCATHeter- ARTerial TITRATE  heparinized saline 2 units/mL infusion  3 Units/hr IntraarTERial CONTINUOUS  
 heparin (porcine) 10,000 unit/mL injection 1,000-10,000 Units  1,000-10,000 Units IntraVENous Multiple  ondansetron (ZOFRAN) injection 4 mg  4 mg IntraVENous Q4H PRN  
 insulin lispro (HUMALOG) injection   SubCUTAneous AC&HS  cefTRIAXone (ROCEPHIN) 1 g in 0.9% sodium chloride (MBP/ADV) 50 mL  1 g IntraVENous Q24H EXAM 
GEN - sleeping, difficult to arouse CV - S1, S2, RRR, no rub, murmur, or gallop Lung - clear to auscultation bilaterally Abd - soft, nontender, BS present Ext - 1+ edema Recent Labs  
  09/25/20 
0405 09/24/20 
1551 09/24/20 
0256 WBC 21.6* 22.8* 25.2* HGB 11.1* 11.6* 12.7* HCT 31.7* 32.2* 36.9*  
 177 198 Recent Labs  
  09/25/20 
0404 09/24/20 
0256 09/23/20 
1125 09/23/20 
7583  136  --  136  
K 4.0 4.2 5.1 5.6*  
 103  --  105 CO2 26 22  --  15* BUN 72* 60*  --  31* CREA 6.49* 5.01*  --  2.70* CA 8.5 8.7  --  7.8*  
* 283*  --  454* MG 2.3 2.1  --  1.8 Assessment and Plan:  
1) MAURICIO-  Likely combination of ATN from ishcemia and contrast injury. Initiated on dialysis yesterday. Poor clearance with HD yesterday. SLED today and likely tomorrow. 2) Hyperkalemia-  better 3) Lactic Acidosis- resolved. 4) STEMI- s/p LHC. Heparin gtt. Impella 5)Pulmonary edema-  Lasix, UF with HD. 6) Gross hematuria- likely leon trauma with Heparin gtt. resolving Danie Mitchell MD

## 2020-09-25 NOTE — PROGRESS NOTES
Patient too lethargic and confused to swallow meds at this time. Darcy Brown, JORDYN cardiology notified. Orders received to insert NGT for medication admin.

## 2020-09-25 NOTE — DIALYSIS
12 HR SLED initiated using  Right CVC. Aspirated and flushed both catheter ports without problem. Machine settings per MD order. 150  DFR  250 UFR Heparin 0 unit bolus and 0 ml/hr. Pt on systemic heparin. Reported to primary nurse. Dialysis nurse available as needed.

## 2020-09-25 NOTE — PROGRESS NOTES
Progress Note Patient: Emili Romeo MRN: [id number]  SSN: [id number] YOB: 1940  Age: 79 y.o. Sex: male Admit Date: 9/22/2020 LOS: 3 days Subjective:  
 
Emili Romeo is a 79 y.o. male who is being seen for consultation for DM management, MAURICIO and hyperkalemia.  
  
Patient has DM and hypertension. He speaks only [place] and daughter is on the phone helping with communication.  
  
Patient has been admitted on 9/22/2020 due to chest pain. He had STEMI and underwent PTCA.  
  
\" PROCEDURES PERFORMED: 1.  Left heart cath, selective coronary arteriography, and left ventriculogram via the right radial approach. 2.  PCI and stenting of the LAD. 3.  IVUS of LAD after stenting. 4.  Impella CP LVAD placement via the right common femoral artery. 5.  Placement of a continuous cardiac output [place] catheter via the left femoral vein. \"  
  
His BS is elevated. His creatinine is up after left heart catheterization and he has hyperkalemia and metabolic acidosis. Nephrology is consulted. Patient has made urine with Lasix, however continues to have rising BUN, creatinine. He had first HD on 9/24/2020 and is having HD again today. Objective:  
 
Vitals:  
 09/25/20 0844 09/25/20 0853 09/25/20 1000 09/25/20 1003 BP: 109/76 119/69  117/75 Pulse: 85 85 85 84 Resp: (!) 34   (!) 36 Temp:      
SpO2: 94%   95% Intake and Output: 
Current Shift: 09/25 0701 - 09/25 1900 In: 390 Out: 347 [Urine:50] Last three shifts: 09/23 1901 - 09/25 0700 In: 1895.5 [I.V.:1895.5] Out: 3470 [OBQRK:6046] Physical Exam:  
 
General:                    The patient is an elderly male in mild acute respiratory distress. sleeping on HD now. QJKE:                                   FKKRTXTUKGIZN/CZLEXSIKIA. Eyes:                                   palpebral pallor, no scleral icterus.  
ENT:                                    External auricular and nasal exam within normal limits.                                             ZPMYHL membranes are moist. 
Neck:                                   Supple, non-tender, no JVD. Right IJ catheter is in place Lungs:                       decreased to auscultation bilaterally without wheezes or crackles.                                             No respiratory distress or accessory muscle use. Heart:                                  Regular rate and rhythm, without murmurs, rubs, or gallops. Abdomen:                  Soft, non-tender, mildly distended with normoactive bowel sounds. Genitourinary:           No tenderness over the bladder or bilateral CVAs. Extremities:               Without clubbing, cyanosis, + edema. Skin:                                    Normal color, texture, and turgor. No rashes, lesions, or jaundice. Pulses:                      Radial and dorsalis pedis pulses present 2+ bilaterally.  
                                            Capillary refill <2s. Neurologic:                CN II-XII grossly intact and symmetrical.  
                                            Moving all four extremities well with no focal deficits. Psychiatric:                Pleasant demeanor, appropriate affect. Alert and oriented x 3 Lab/Data Review: 
 
Recent Results (from the past 24 hour(s)) GLUCOSE, POC Collection Time: 09/24/20 10:48 AM  
Result Value Ref Range Glucose (POC) 226 (H) 65 - 100 mg/dL POC ACTIVATED CLOTTING TIME Collection Time: 09/24/20  1:49 PM  
Result Value Ref Range Activated Clotting Time (POC) 142 (H) 70 - 128 SECS  
POC ACTIVATED CLOTTING TIME Collection Time: 09/24/20  2:54 PM  
Result Value Ref Range Activated Clotting Time (POC) 142 (H) 70 - 128 SECS  
CBC WITH AUTOMATED DIFF Collection Time: 09/24/20  3:51 PM  
Result Value Ref Range WBC 22.8 (H) 4.3 - 11.1 K/uL  
 RBC 3.90 (L) 4.23 - 5.6 M/uL  
 HGB 11.6 (L) 13.6 - 17.2 g/dL HCT 32.2 (L) 41.1 - 50.3 % MCV 82.6 79.6 - 97.8 FL  
 MCH 29.7 26.1 - 32.9 PG  
 MCHC 36.0 (H) 31.4 - 35.0 g/dL  
 RDW 14.6 11.9 - 14.6 % PLATELET 918 194 - 774 K/uL MPV 12.1 9.4 - 12.3 FL ABSOLUTE NRBC 0.02 0.0 - 0.2 K/uL  
 DF AUTOMATED NEUTROPHILS 77 43 - 78 % LYMPHOCYTES 12 (L) 13 - 44 % MONOCYTES 8 4.0 - 12.0 % EOSINOPHILS 0 (L) 0.5 - 7.8 % BASOPHILS 0 0.0 - 2.0 % IMMATURE GRANULOCYTES 2 0.0 - 5.0 %  
 ABS. NEUTROPHILS 17.6 (H) 1.7 - 8.2 K/UL  
 ABS. LYMPHOCYTES 2.8 0.5 - 4.6 K/UL  
 ABS. MONOCYTES 1.9 (H) 0.1 - 1.3 K/UL  
 ABS. EOSINOPHILS 0.0 0.0 - 0.8 K/UL  
 ABS. BASOPHILS 0.1 0.0 - 0.2 K/UL  
 ABS. IMM. GRANS. 0.5 0.0 - 0.5 K/UL  
HEP B SURFACE AG Collection Time: 09/24/20  3:51 PM  
Result Value Ref Range Hep B Surface Ag NONREACTIVE NR    
POC ACTIVATED CLOTTING TIME Collection Time: 09/24/20  4:07 PM  
Result Value Ref Range Activated Clotting Time (POC) 158 (H) 70 - 128 SECS  
GLUCOSE, POC Collection Time: 09/24/20  4:13 PM  
Result Value Ref Range Glucose (POC) 226 (H) 65 - 100 mg/dL POC ACTIVATED CLOTTING TIME Collection Time: 09/24/20  5:14 PM  
Result Value Ref Range Activated Clotting Time (POC) 169 (H) 70 - 128 SECS  
POC ACTIVATED CLOTTING TIME Collection Time: 09/24/20  6:36 PM  
Result Value Ref Range Activated Clotting Time (POC) 158 (H) 70 - 128 SECS  
POC ACTIVATED CLOTTING TIME Collection Time: 09/24/20  8:01 PM  
Result Value Ref Range Activated Clotting Time (POC) 164 (H) 70 - 128 SECS  
GLUCOSE, POC Collection Time: 09/24/20  9:40 PM  
Result Value Ref Range Glucose (POC) 249 (H) 65 - 100 mg/dL POC ACTIVATED CLOTTING TIME Collection Time: 09/24/20 10:06 PM  
Result Value Ref Range Activated Clotting Time (POC) 164 (H) 70 - 128 SECS  
POC ACTIVATED CLOTTING TIME Collection Time: 09/25/20 12:01 AM  
Result Value Ref Range  Activated Clotting Time (POC) 164 (H) 70 - 495 SECS  
METABOLIC PANEL, BASIC  
 Collection Time: 09/25/20  4:04 AM  
Result Value Ref Range Sodium 137 136 - 145 mmol/L Potassium 4.0 3.5 - 5.1 mmol/L Chloride 101 98 - 107 mmol/L  
 CO2 26 21 - 32 mmol/L Anion gap 10 7 - 16 mmol/L Glucose 205 (H) 65 - 100 mg/dL BUN 72 (H) 8 - 23 MG/DL Creatinine 6.49 (H) 0.8 - 1.5 MG/DL  
 GFR est AA 11 (L) >60 ml/min/1.73m2 GFR est non-AA 9 (L) >60 ml/min/1.73m2 Calcium 8.5 8.3 - 10.4 MG/DL MAGNESIUM Collection Time: 09/25/20  4:04 AM  
Result Value Ref Range Magnesium 2.3 1.8 - 2.4 mg/dL CBC WITH AUTOMATED DIFF Collection Time: 09/25/20  4:05 AM  
Result Value Ref Range WBC 21.6 (H) 4.3 - 11.1 K/uL  
 RBC 3.80 (L) 4.23 - 5.6 M/uL  
 HGB 11.1 (L) 13.6 - 17.2 g/dL HCT 31.7 (L) 41.1 - 50.3 % MCV 83.4 79.6 - 97.8 FL  
 MCH 29.2 26.1 - 32.9 PG  
 MCHC 35.0 31.4 - 35.0 g/dL  
 RDW 14.6 11.9 - 14.6 % PLATELET 005 840 - 089 K/uL MPV 11.8 9.4 - 12.3 FL ABSOLUTE NRBC 0.04 0.0 - 0.2 K/uL  
 DF AUTOMATED NEUTROPHILS 78 43 - 78 % LYMPHOCYTES 11 (L) 13 - 44 % MONOCYTES 9 4.0 - 12.0 % EOSINOPHILS 0 (L) 0.5 - 7.8 % BASOPHILS 0 0.0 - 2.0 % IMMATURE GRANULOCYTES 2 0.0 - 5.0 %  
 ABS. NEUTROPHILS 16.8 (H) 1.7 - 8.2 K/UL  
 ABS. LYMPHOCYTES 2.3 0.5 - 4.6 K/UL  
 ABS. MONOCYTES 1.8 (H) 0.1 - 1.3 K/UL  
 ABS. EOSINOPHILS 0.0 0.0 - 0.8 K/UL  
 ABS. BASOPHILS 0.1 0.0 - 0.2 K/UL  
 ABS. IMM. GRANS. 0.5 0.0 - 0.5 K/UL  
POC ACTIVATED CLOTTING TIME Collection Time: 09/25/20  4:05 AM  
Result Value Ref Range Activated Clotting Time (POC) 158 (H) 70 - 128 SECS  
POC ACTIVATED CLOTTING TIME Collection Time: 09/25/20  8:23 AM  
Result Value Ref Range Activated Clotting Time (POC) 164 (H) 70 - 128 SECS  
GLUCOSE, POC Collection Time: 09/25/20  8:46 AM  
Result Value Ref Range Glucose (POC) 246 (H) 65 - 100 mg/dL LACTIC ACID Collection Time: 09/25/20  9:01 AM  
Result Value Ref Range  Lactic acid 1.7 0.4 - 2.0 MMOL/L  
 EKG, 12 LEAD, INITIAL Collection Time: 09/25/20  9:21 AM  
Result Value Ref Range Ventricular Rate 86 BPM  
 Atrial Rate 86 BPM  
 P-R Interval 144 ms QRS Duration 108 ms Q-T Interval 374 ms QTC Calculation (Bezet) 447 ms Calculated P Axis 57 degrees Calculated R Axis -79 degrees Calculated T Axis 88 degrees Diagnosis Normal sinus rhythm Left anterior fascicular block Anterior infarct (cited on or before 23-SEP-2020) Inferolateral injury pattern 
** ** ACUTE MI / STEMI ** ** Abnormal ECG When compared with ECG of 23-SEP-2020 06:13, Left anterior fascicular block is now Present ST elevation now present in Inferior leads XR chest  
9/23/2020 IMPRESSION: 
  
1. Improved aeration of the lungs, consistent with resolving pulmonary edema. XR chest  
9/24/2020 IMPRESSION: 
  
1. Progression of bilateral interstitial densities, likely pulmonary edema. Current Facility-Administered Medications:  
  hydrALAZINE (APRESOLINE) tablet 25 mg, 25 mg, Oral, TID, Maria Luz Davis MD, Stopped at 09/25/20 0900   lip protectant (BLISTEX) ointment 1 Each, 1 Each, Topical, PRN, Marcell Underwood MD 
  furosemide (LASIX) injection 80 mg, 80 mg, IntraVENous, Q12H, Pieter Nunez MD, 80 mg at 09/25/20 9741   LORazepam (ATIVAN) injection 1 mg, 1 mg, IntraVENous, Q2H PRN, Aimee Motley MD, 1 mg at 09/25/20 0626 
  atorvastatin (LIPITOR) tablet 80 mg, 80 mg, Oral, DAILY, Lenore Myles MD, 80 mg at 09/25/20 0846 
  insulin glargine (LANTUS) injection 20 Units, 20 Units, SubCUTAneous, DAILY, Troy Bass MD, 20 Units at 09/25/20 0847 
  labetaloL (NORMODYNE;TRANDATE) injection 20 mg, 20 mg, IntraVENous, Q6H PRN, Lenore Myles MD 
  carvediloL (COREG) tablet 3.125 mg, 3.125 mg, Oral, BID WITH MEALS, Maria Luz Davis MD, 3.125 mg at 09/25/20 0847 
  sodium chloride (NS) flush 5-40 mL, 5-40 mL, IntraVENous, Q8H, Pierre Zhang MD, 10 mL at 09/25/20 0236 
  sodium chloride (NS) flush 5-40 mL, 5-40 mL, IntraVENous, PRN, Varun Davis MD 
  acetaminophen (TYLENOL) tablet 650 mg, 650 mg, Oral, Q4H PRN, Varun Davis MD 
  morphine injection 2 mg, 2 mg, IntraVENous, Q4H PRN, Varun Davis MD, 2 mg at 09/25/20 3415 
  nitroglycerin (NITROSTAT) tablet 0.4 mg, 0.4 mg, SubLINGual, Q5MIN PRN, Varun Davis MD 
  aspirin chewable tablet 81 mg, 81 mg, Oral, DAILY, Varun Davis MD, 81 mg at 09/25/20 0846 
  HYDROcodone-acetaminophen (NORCO) 5-325 mg per tablet 1 Tab, 1 Tab, Oral, Q4H PRN, Varun Davis MD, 1 Tab at 09/23/20 2035   ticagrelor (BRILINTA) tablet 90 mg, 90 mg, Oral, Q12H, Varun Davis MD, 90 mg at 09/25/20 3022   heparin (porcine) 25,000 units in 0.45% saline 500 ml infusion, 12-25 Units/kg/hr, IntraVENous, TITRATE, Varun Davis MD, Last Rate: 18 mL/hr at 09/25/20 1007, 11 Units/kg/hr at 09/25/20 1007   heparin (porcine) 12,500 Units in dextrose 10% 1,000 mL for impella device, 4-20 mL/hr, IntraCATHeter- ARTerial, TITRATE, Varun Davis MD, Last Rate: 9.4 mL/hr at 09/24/20 1912, 9.4 mL/hr at 09/24/20 1912   heparinized saline 2 units/mL infusion, 3 Units/hr, IntraarTERial, CONTINUOUS, Varun Davis MD, Stopped at 09/22/20 1315   heparin (porcine) 10,000 unit/mL injection 1,000-10,000 Units, 1,000-10,000 Units, IntraVENous, Multiple, Varun Davis MD, 4,000 Units at 09/22/20 1134 
  ondansetron [facility]) injection 4 mg, 4 mg, IntraVENous, Q4H PRN, Varun Davis MD, 4 mg at 09/23/20 0745   insulin lispro (HUMALOG) injection, , SubCUTAneous, AC&HS, Karri Munguia MD, 4 Units at 09/25/20 4021   cefTRIAXone (ROCEPHIN) 1 g in 0.9% sodium chloride (MBP/ADV) 50 mL, 1 g, IntraVENous, Q24H, Karri Munguia MD, Last Rate: 100 mL/hr at 09/24/20 1829, 1 g at 09/24/20 1829 Assessment:  
 
Principal Problem: Acute cystitis without hematuria (9/22/2020) Active Problems: 
  Acute ST elevation myocardial infarction (STEMI) involving left anterior descending (LAD) coronary artery ([facility] [address] (9/22/2020) Hypertension (9/22/2020) Diabetes mellitus type 2, controlled ([facility] [address] (9/22/2020) Acute renal failure (ARF) ([address] (9/23/2020) Cardiogenic shock ([facility] [address] (9/25/2020) Delirium (9/25/2020) NSVT (nonsustained ventricular tachycardia) ([facility] [address] (9/25/2020) Plan: STEMI  
S/P PTCA Cardiology is managing. MAURICIO S/P PTCA, left heart catheterization Pulmonary edema Fluid overload Uremia For HD again today Diabetes mellitus type 2 Monitor blood sugar. Cover with insulin sliding scale accordingly. Lantus is increased BS is improved. Hypertension Monitor blood pressure and manage accordingly. BP is acceptable now. I have discussed the plan of care with patient and spouse at bedside. DVT prophylaxis : heparin IV already Signed By: Meredith Faria MD   
 September 25, 2020

## 2020-09-25 NOTE — PROGRESS NOTES
rounded on patient with nurse. Interpreting services offered when needed. [address] staff  available over the phone from 3:30 p.m. - midnight. Please call Kayla at [phone] with any interpreting requests. Nba Drake CERTIFIED [facility] INTERPRETERTM Language Services Department 
[address]  [facility] 
[phone] (phone)

## 2020-09-25 NOTE — PROGRESS NOTES
[facility] staff  available over the phone from 3:30 p.m. - midnight. Please call Kayla at [phone] with any interpreting requests. Dasia De Jesus CERTIFIED [facility] INTERPRETERTM Language Services Department 
[address] [place] [address] 
[phone] (phone)

## 2020-09-25 NOTE — PROGRESS NOTES
Bedside and Verbal shift change report given to [phone] (oncoming nurse) by Destinee Siddiqui (offgoing nurse). Report included the following information SBAR, Kardex, Intake/Output, MAR, Recent Results, Med Rec Status, Cardiac Rhythm SR 85 and Alarm Parameters .

## 2020-09-26 NOTE — PROGRESS NOTES
9/26/2020 11:18 AM 
 
Admit Date: 9/22/2020 Admit Diagnosis: STEMI (ST elevation myocardial infarction) ([facility] [address] [I21.3] Subjective:  
confused No ros Objective:  
  
Visit Vitals /69 Pulse 85 Temp 96.8 °F (36 °C) Resp 29 Wt 180 lb (81.6 kg) SpO2 95% BMI 29.05 kg/m² Physical Exam: 
R[place], Well Nourished, No Acute Distress, Alert & Oriented x 3, appropriate mood. Neck- supple, no JVD 
CV- regular rate and rhythm no MRG Lung- clear bilaterally Abd- soft, nontender, nondistended Ext- no edema bilaterally. Skin- warm and dry Data Review:  
Recent Labs  
  09/26/20 
2521   
K 4.4 BUN 41* CREA 4.29* WBC 19.1* HGB 10.8* HCT 31.7*  Assessment/Plan:  
 
Principal Problem: 
  Acute cystitis without hematuria (9/22/2020) Active Problems: 
  Acute ST elevation myocardial infarction (STEMI) involving left anterior descending (LAD) coronary artery ([address] (9/22/2020)- unchanged- supportive care Hypertension (9/22/2020) Diabetes mellitus type 2, controlled ([address] (9/22/2020) Acute renal failure (ARF) ([address] (9/23/2020) Cardiogenic shock ([address] (9/25/2020)-unchanged- slow wean off impella today to begin with ams will ask neuro to see Poor prognosis discussed with family Delirium (9/25/2020) NSVT (nonsustained ventricular tachycardia) ([address] (9/25/2020)

## 2020-09-26 NOTE — PROGRESS NOTES
Progress Note Patient: Driscilla Mcburney MRN: [id number]  SSN: [id number] YOB: 1940  Age: 79 y.o. Sex: male Admit Date: 9/22/2020 LOS: 4 days Subjective:  
 
Driscilla Mcburney is a 79 y.o. male who is being seen for consultation for DM management, MAURICIO and hyperkalemia.  
  
Patient has DM and hypertension. He speaks only [place] and daughter is on the phone helping with communication.  
  
Patient has been admitted on 9/22/2020 due to chest pain. He had STEMI and underwent PTCA.  
  
\" PROCEDURES PERFORMED: 1.  Left heart cath, selective coronary arteriography, and left ventriculogram via the right radial approach. 2.  PCI and stenting of the LAD. 3.  IVUS of LAD after stenting. 4.  Impella CP LVAD placement via the right common femoral artery. 5.  Placement of a continuous cardiac output Maren[place]-Richard catheter via the left femoral vein. \"  
  
His BS is elevated. His creatinine is up after left heart catheterization and he has hyperkalemia and metabolic acidosis. Nephrology is consulted. Patient has made little urine. He has been on HD for the past 2 days and is on HD again today. Objective:  
 
Vitals:  
 09/26/20 0800 09/26/20 6808 09/26/20 0829 09/26/20 9962 BP:  116/79 116/80 111/72 Pulse: 91 92 93 91 Resp: (!) 7 21 (!) 37 8 Temp:      
SpO2: 96% 98% 98% 97% Weight:      
  
 
Intake and Output: 
Current Shift: 09/26 0701 - 09/26 1900 In: 300 Out: 0 Last three shifts: 09/24 1901 - 09/26 0700 In: 1515.9 [I.V.:1085.9] Out: 3420 [Urine:420] Physical Exam:  
 
General:                    The patient is an elderly male in mild acute respiratory distress. somnolent. On HD session now. Daughter is at bedside. Patient is moaning when he is called. WNTA:                                   [id number]. Eyes:                                   palpebral pallor, no scleral icterus. ENT:                                    External auricular and nasal exam within normal limits.                                             KJGIIN membranes are moist. 
Neck:                                   Supple, non-tender, no JVD. Right IJ catheter is in place Lungs:                       decreased to auscultation bilaterally without wheezes or crackles.                                             No respiratory distress or accessory muscle use. Heart:                                  Regular rate and rhythm, without murmurs, rubs, or gallops. Abdomen:                  Soft, non-tender, mildly distended with normoactive bowel sounds. Genitourinary:           No tenderness over the bladder or bilateral CVAs. Wise's catheter in place. Extremities:               Without clubbing, cyanosis,mild edema. Right groin with catheter in place. Skin:                                    Normal color, texture, and turgor. No rashes, lesions, or jaundice. Pulses:                      Radial and dorsalis pedis pulses present 2+ bilaterally.  
                                            Capillary refill <2s. Neurologic:                CN II-XII grossly intact and symmetrical.  
                                            Moving all four extremities well with no focal deficits. Lab/Data Review: 
 
Recent Results (from the past 24 hour(s)) LACTIC ACID Collection Time: 09/25/20  9:01 AM  
Result Value Ref Range Lactic acid 1.7 0.4 - 2.0 MMOL/L  
EKG, 12 LEAD, INITIAL Collection Time: 09/25/20  9:21 AM  
Result Value Ref Range Ventricular Rate 86 BPM  
 Atrial Rate 86 BPM  
 P-R Interval 144 ms QRS Duration 108 ms Q-T Interval 374 ms QTC Calculation (Bezet) 447 ms Calculated P Axis 57 degrees Calculated R Axis -79 degrees Calculated T Axis 88 degrees Diagnosis Normal sinus rhythm Left anterior fascicular block Anterior infarct (cited on or before 23-SEP-2020) Inferolateral injury pattern 
** ** ACUTE MI / STEMI ** ** Abnormal ECG When compared with ECG of 23-SEP-2020 06:13, Left anterior fascicular block is now Present 
persistent anterior apical injury current since 9/22/20 Confirmed by MIGUEL IBARRA (), Aric Verenicepatsy ([id number]) on 9/25/2020 10:55:53 AM 
  
GLUCOSE, POC Collection Time: 09/25/20 12:13 PM  
Result Value Ref Range Glucose (POC) 121 (H) 65 - 100 mg/dL POC ACTIVATED CLOTTING TIME Collection Time: 09/25/20 12:15 PM  
Result Value Ref Range Activated Clotting Time (POC) 164 (H) 70 - 128 SECS  
GLUCOSE, POC Collection Time: 09/25/20  4:03 PM  
Result Value Ref Range Glucose (POC) 116 (H) 65 - 100 mg/dL POC ACTIVATED CLOTTING TIME Collection Time: 09/25/20  4:05 PM  
Result Value Ref Range Activated Clotting Time (POC) 153 (H) 70 - 128 SECS  
CBC WITH AUTOMATED DIFF Collection Time: 09/25/20  5:16 PM  
Result Value Ref Range WBC 18.9 (H) 4.3 - 11.1 K/uL  
 RBC 3.91 (L) 4.23 - 5.6 M/uL  
 HGB 11.2 (L) 13.6 - 17.2 g/dL HCT 32.9 (L) 41.1 - 50.3 % MCV 84.1 79.6 - 97.8 FL  
 MCH 28.6 26.1 - 32.9 PG  
 MCHC 34.0 31.4 - 35.0 g/dL  
 RDW 14.7 (H) 11.9 - 14.6 % PLATELET 692 018 - 348 K/uL MPV 11.7 9.4 - 12.3 FL ABSOLUTE NRBC 0.14 0.0 - 0.2 K/uL  
 DF AUTOMATED NEUTROPHILS 79 (H) 43 - 78 % LYMPHOCYTES 10 (L) 13 - 44 % MONOCYTES 8 4.0 - 12.0 % EOSINOPHILS 1 0.5 - 7.8 % BASOPHILS 0 0.0 - 2.0 % IMMATURE GRANULOCYTES 2 0.0 - 5.0 %  
 ABS. NEUTROPHILS 15.0 (H) 1.7 - 8.2 K/UL  
 ABS. LYMPHOCYTES 1.9 0.5 - 4.6 K/UL  
 ABS. MONOCYTES 1.5 (H) 0.1 - 1.3 K/UL  
 ABS. EOSINOPHILS 0.1 0.0 - 0.8 K/UL  
 ABS. BASOPHILS 0.1 0.0 - 0.2 K/UL  
 ABS. IMM. GRANS. 0.4 0.0 - 0.5 K/UL  
POC ACTIVATED CLOTTING TIME Collection Time: 09/25/20  8:12 PM  
Result Value Ref Range Activated Clotting Time (POC) 164 (H) 70 - 128 SECS  
GLUCOSE, POC Collection Time: 09/25/20 10:11 PM  
Result Value Ref Range Glucose (POC) 154 (H) 65 - 100 mg/dL POC ACTIVATED CLOTTING TIME Collection Time: 09/26/20 12:21 AM  
Result Value Ref Range Activated Clotting Time (POC) 164 (H) 70 - 128 SECS  
CBC WITH AUTOMATED DIFF Collection Time: 09/26/20  4:20 AM  
Result Value Ref Range WBC 19.1 (H) 4.3 - 11.1 K/uL  
 RBC 3.74 (L) 4.23 - 5.6 M/uL  
 HGB 10.8 (L) 13.6 - 17.2 g/dL HCT 31.7 (L) 41.1 - 50.3 % MCV 84.8 79.6 - 97.8 FL  
 MCH 28.9 26.1 - 32.9 PG  
 MCHC 34.1 31.4 - 35.0 g/dL  
 RDW 14.7 (H) 11.9 - 14.6 % PLATELET 116 748 - 982 K/uL MPV 12.2 9.4 - 12.3 FL ABSOLUTE NRBC 0.26 (H) 0.0 - 0.2 K/uL  
 DF AUTOMATED NEUTROPHILS 76 43 - 78 % LYMPHOCYTES 11 (L) 13 - 44 % MONOCYTES 11 4.0 - 12.0 % EOSINOPHILS 1 0.5 - 7.8 % BASOPHILS 0 0.0 - 2.0 % IMMATURE GRANULOCYTES 2 0.0 - 5.0 %  
 ABS. NEUTROPHILS 14.5 (H) 1.7 - 8.2 K/UL  
 ABS. LYMPHOCYTES 2.1 0.5 - 4.6 K/UL  
 ABS. MONOCYTES 2.0 (H) 0.1 - 1.3 K/UL  
 ABS. EOSINOPHILS 0.1 0.0 - 0.8 K/UL  
 ABS. BASOPHILS 0.1 0.0 - 0.2 K/UL  
 ABS. IMM. GRANS. 0.4 0.0 - 0.5 K/UL  
LACTIC ACID Collection Time: 09/26/20  4:20 AM  
Result Value Ref Range Lactic acid 2.1 (HH) 0.4 - 2.0 MMOL/L  
METABOLIC PANEL, BASIC Collection Time: 09/26/20  4:20 AM  
Result Value Ref Range Sodium 139 136 - 145 mmol/L Potassium 4.4 3.5 - 5.1 mmol/L Chloride 102 98 - 107 mmol/L  
 CO2 30 21 - 32 mmol/L Anion gap 7 7 - 16 mmol/L Glucose 169 (H) 65 - 100 mg/dL BUN 41 (H) 8 - 23 MG/DL Creatinine 4.29 (H) 0.8 - 1.5 MG/DL  
 GFR est AA 17 (L) >60 ml/min/1.73m2 GFR est non-AA 14 (L) >60 ml/min/1.73m2 Calcium 8.6 8.3 - 10.4 MG/DL  
HEPATIC FUNCTION PANEL Collection Time: 09/26/20  4:20 AM  
Result Value Ref Range Protein, total 7.2 6.3 - 8.2 g/dL Albumin 2.8 (L) 3.2 - 4.6 g/dL Globulin 4.4 (H) 2.3 - 3.5 g/dL A-G Ratio 0.6 (L) 1.2 - 3.5 Bilirubin, total 2.7 (H) 0.2 - 1.1 MG/DL  Bilirubin, direct 0.5 (H) <0.4 MG/DL  
 Alk. phosphatase 86 50 - 136 U/L  
 AST (SGOT) 1,531 (H) 15 - 37 U/L  
 ALT (SGPT) 421 (H) 12 - 65 U/L  
BILIRUBIN,INDIRECT Collection Time: 09/26/20  4:20 AM  
Result Value Ref Range Bilirubin, indirect 2.2 (H) 0.0 - 1.1 MG/DL  
POC ACTIVATED CLOTTING TIME Collection Time: 09/26/20  4:29 AM  
Result Value Ref Range Activated Clotting Time (POC) 158 (H) 70 - 128 SECS  
POC ACTIVATED CLOTTING TIME Collection Time: 09/26/20  8:02 AM  
Result Value Ref Range Activated Clotting Time (POC) 175 (H) 70 - 128 SECS  
 
XR chest  
9/23/2020 IMPRESSION: 
  
1. Improved aeration of the lungs, consistent with resolving pulmonary edema. XR chest  
9/24/2020 IMPRESSION: 
  
1. Progression of bilateral interstitial densities, likely pulmonary edema. Current Facility-Administered Medications:  
  hydrALAZINE (APRESOLINE) tablet 25 mg, 25 mg, Oral, TID, Pete Davis MD, Stopped at 09/25/20 0900   lip protectant (BLISTEX) ointment 1 Each, 1 Each, Topical, PRN, Natali Fletcher MD 
  furosemide (LASIX) injection 80 mg, 80 mg, IntraVENous, Q12H, Riki Nunez MD, Stopped at 09/25/20 2100 
  LORazepam (ATIVAN) injection 1 mg, 1 mg, IntraVENous, Q2H PRN, Leyda Sahu MD, 1 mg at 09/26/20 0430 
  atorvastatin (LIPITOR) tablet 80 mg, 80 mg, Oral, DAILY, Eva Myles MD, 80 mg at 09/26/20 0811 
  insulin glargine (LANTUS) injection 20 Units, 20 Units, SubCUTAneous, DAILY, Troy Bass MD, 20 Units at 09/26/20 0834 
  labetaloL (NORMODYNE;TRANDATE) injection 20 mg, 20 mg, IntraVENous, Q6H PRN, Eva Myles MD 
  carvediloL (COREG) tablet 3.125 mg, 3.125 mg, Oral, BID WITH MEALS, Pete Davis MD, Stopped at 09/25/20 1700 
  sodium chloride (NS) flush 5-40 mL, 5-40 mL, IntraVENous, Q8H, Pete Davis MD, 10 mL at 09/26/20 0216   sodium chloride (NS) flush 5-40 mL, 5-40 mL, IntraVENous, PRN, Pete Davis MD 
   acetaminophen (TYLENOL) tablet 650 mg, 650 mg, Oral, Q4H PRN, Varun Davis MD 
  morphine injection 2 mg, 2 mg, IntraVENous, Q4H PRN, Varun Davis MD, 2 mg at 09/26/20 0429 
  nitroglycerin (NITROSTAT) tablet 0.4 mg, 0.4 mg, SubLINGual, Q5MIN PRN, Varun Davis MD 
  aspirin chewable tablet 81 mg, 81 mg, Oral, DAILY, Varun Davis MD, 81 mg at 09/26/20 0811 
  HYDROcodone-acetaminophen (NORCO) 5-325 mg per tablet 1 Tab, 1 Tab, Oral, Q4H PRN, Varun Davis MD, 1 Tab at 09/23/20 2035   ticagrelor (BRILINTA) tablet 90 mg, 90 mg, Oral, Q12H, Varun Davis MD, 90 mg at 09/26/20 2764 
  heparin (porcine) 25,000 units in 0.45% saline 500 ml infusion, 12-25 Units/kg/hr, IntraVENous, TITRATE, Varun Davis MD, Last Rate: 16.3 mL/hr at 09/26/20 0810, 10 Units/kg/hr at 09/26/20 0810 
  heparin (porcine) 12,500 Units in dextrose 10% 1,000 mL for impella device, 4-20 mL/hr, IntraCATHeter- ARTerial, TITRATE, Varun Davis MD, Last Rate: 7.7 mL/hr at 09/25/20 2002, 7.7 mL/hr at 09/25/20 2002   heparinized saline 2 units/mL infusion, 3 Units/hr, IntraarTERial, CONTINUOUS, Varun Davis MD, Stopped at 09/22/20 1315   heparin (porcine) 10,000 unit/mL injection 1,000-10,000 Units, 1,000-10,000 Units, IntraVENous, Multiple, Varun Davis MD, 4,000 Units at 09/22/20 1134 
  ondansetron [facility]) injection 4 mg, 4 mg, IntraVENous, Q4H PRN, Varun Davis MD, 4 mg at 09/23/20 0745   insulin lispro (HUMALOG) injection, , SubCUTAneous, AC&HS, Karri Munguia MD, 2 Units at 09/26/20 6954   cefTRIAXone (ROCEPHIN) 1 g in 0.9% sodium chloride (MBP/ADV) 50 mL, 1 g, IntraVENous, Q24H, Karri Munguia MD, Last Rate: 100 mL/hr at 09/25/20 1811, 1 g at 09/25/20 1811 Assessment:  
 
Principal Problem: 
  Acute cystitis without hematuria (9/22/2020) Active Problems: 
  Acute ST elevation myocardial infarction (STEMI) involving left anterior descending (LAD) coronary artery ([facility] (9/22/2020) Hypertension (9/22/2020) Diabetes mellitus type 2, controlled ([facility] (9/22/2020) Acute renal failure (ARF) ([facility] (9/23/2020) Cardiogenic shock [facility] (9/25/2020) Delirium (9/25/2020) NSVT (nonsustained ventricular tachycardia) ([facility] (9/25/2020) Plan: STEMI  
S/P PTCA Cardiology is managing. MAURICIO S/P PTCA, left heart catheterization Pulmonary edema Fluid overload Uremia For HD today So far, no evidence of renal function return. Diabetes mellitus type 2 Monitor blood sugar. Cover with insulin sliding scale accordingly. Lantus is increased BS is improved. Hypertension Monitor blood pressure and manage accordingly. BP is acceptable now. I have discussed the plan of care with patient and daughter at bedside. DVT prophylaxis : heparin IV already Signed By: Aranza Regalado MD   
 September 26, 2020

## 2020-09-26 NOTE — PROGRESS NOTES
Bedside, Verbal and Written shift change report given to [phone] (oncoming nurse) by Shekhar Del Real and Gerson Johnson RN (offgoing nurse). Report included the following information SBAR, Kardex, ED Summary, Procedure Summary, Intake/Output, MAR, Accordion, Recent Results, Med Rec Status, Cardiac Rhythm NSR and Alarm Parameters . Groin and pulse checks complete. Heparin gtt dual verified.

## 2020-09-26 NOTE — DIALYSIS
12 HR SLED initiated using  Right IJ catheter. Aspirated and flushed both catheter ports without problem. Machine settings per MD order. 150  DFR  250 UFR Reported to primary nurse. Dialysis nurse available as needed.

## 2020-09-26 NOTE — PROGRESS NOTES
[facility] PROGRESS NOTE Follow up for: 
 
Subjective:  
Patient seen and examined at bedside. Tolerated SLED yesterday. Still not arousable. ROS:  UTO Objective:  
Exam: 
Vitals:  
 09/26/20 9975 09/26/20 0559 09/26/20 9918 09/26/20 4247 BP: 104/68 109/72 107/70 116/88 Pulse: 87 89 89 91 Resp: 10 19 23 16 Temp:      
SpO2: 97% 96% 96% 97% Weight:      
 
 
 
Intake/Output Summary (Last 24 hours) at 9/26/2020 0745 Last data filed at 9/26/2020 3500 Gross per 24 hour Intake 997.73 ml Output 3195 ml Net -2197.27 ml  
 
 
Current Facility-Administered Medications Medication Dose Route Frequency  hydrALAZINE (APRESOLINE) tablet 25 mg  25 mg Oral TID  lip protectant (BLISTEX) ointment 1 Each  1 Each Topical PRN  
 furosemide (LASIX) injection 80 mg  80 mg IntraVENous Q12H  
 LORazepam (ATIVAN) injection 1 mg  1 mg IntraVENous Q2H PRN  
 atorvastatin (LIPITOR) tablet 80 mg  80 mg Oral DAILY  insulin glargine (LANTUS) injection 20 Units  20 Units SubCUTAneous DAILY  labetaloL (NORMODYNE;TRANDATE) injection 20 mg  20 mg IntraVENous Q6H PRN  
 carvediloL (COREG) tablet 3.125 mg  3.125 mg Oral BID WITH MEALS  sodium chloride (NS) flush 5-40 mL  5-40 mL IntraVENous Q8H  
 sodium chloride (NS) flush 5-40 mL  5-40 mL IntraVENous PRN  
 acetaminophen (TYLENOL) tablet 650 mg  650 mg Oral Q4H PRN  
 morphine injection 2 mg  2 mg IntraVENous Q4H PRN  
 nitroglycerin (NITROSTAT) tablet 0.4 mg  0.4 mg SubLINGual Q5MIN PRN  
 aspirin chewable tablet 81 mg  81 mg Oral DAILY  HYDROcodone-acetaminophen (NORCO) 5-325 mg per tablet 1 Tab  1 Tab Oral Q4H PRN  
 ticagrelor (BRILINTA) tablet 90 mg  90 mg Oral Q12H  
 heparin (porcine) 25,000 units in 0.45% saline 500 ml infusion  12-25 Units/kg/hr IntraVENous TITRATE  heparin (porcine) 12,500 Units in dextrose 10% 1,000 mL for impella device  4-20 mL/hr IntraCATHeter- ARTerial TITRATE  heparinized saline 2 units/mL infusion  3 Units/hr IntraarTERial CONTINUOUS  
 heparin (porcine) 10,000 unit/mL injection 1,000-10,000 Units  1,000-10,000 Units IntraVENous Multiple  ondansetron (ZOFRAN) injection 4 mg  4 mg IntraVENous Q4H PRN  
 insulin lispro (HUMALOG) injection   SubCUTAneous AC&HS  cefTRIAXone (ROCEPHIN) 1 g in 0.9% sodium chloride (MBP/ADV) 50 mL  1 g IntraVENous Q24H EXAM 
GEN - sleeping, difficult to arouse CV - S1, S2, RRR, no rub, murmur, or gallop Lung - BS obscured by upper way sounds Abd - soft, nontender, BS present Ext - no edema Recent Labs  
  09/26/20 
0420 09/25/20 
1716 09/25/20 
0405 WBC 19.1* 18.9* 21.6* HGB 10.8* 11.2* 11.1*  
HCT 31.7* 32.9* 31.7*  159 153 Recent Labs  
  09/26/20 
0420 09/25/20 
0404 09/24/20 
0256  137 136  
K 4.4 4.0 4.2  101 103 CO2 30 26 22 BUN 41* 72* 60* CREA 4.29* 6.49* 5.01* CA 8.6 8.5 8.7 * 205* 283* MG  --  2.3 2.1 Assessment and Plan:  
1) MAURICIO-  Likely combination of ATN from ishcemia and contrast injury. Initiated on dialysis on the 24th. Plan on SLED again today. 2) Hyperkalemia-  better 3) Lactic Acidosis- resolved. 4) STEMI- s/p LHC. Heparin gtt. Impella 5)Pulmonary edema-  Persistent despite UF yesterday. UF with HD again today. 6) Gross hematuria- likely leon trauma with Heparin gtt.  
 
Sania Sexton MD

## 2020-09-26 NOTE — CONSULTS
Comprehensive Nutrition Assessment Type and Reason for Visit: Initial, Consult TF Management for Cardiology. Nutrition Recommendations/Plan: Start TF with Nepro @ 15 ml/hr with a 10 ml/hr water flush and advance as tolerated to the goal rate of 45 ml/hr - 1944 calories/day (100% calorie goal), 87 gm protein/day (~100% protein goal) in 1023 ml water/day. Nutrition Assessment:  Patient is a 79 y.o. male who presented to [address] with CP w EKG showing ST w anterior STEMI. H/O htn and DM. Pt transferred emergently downtown for [facility]. He began having severe chest pressure one hour earlier w SOB and diaphoresis, worse w exertion, pain severe. Pt given ASA, heparin and started on a nitro drip. Initial /99, decreased to 126/80 en route. Pt continuing to have severe pain, unable to quantify. He is s/p left heart cath with PCI and stent to LAD. He remains on Impella since cardiac cath shortly after admission. Estimated Daily Nutrient Needs: 
Energy (kcal):  8864-9790 (30-35 kirt/kgIBW/day using 65 kg) Protein (g):  78-85 (1.2-1.3 gm pro/kgIBW/day - MAURICIO on HD.) Fluid (ml/day):  minimal per renal guidelines Nutrition Related Findings:  9/26: Remains on Impella since admission. Dialysis was started on 9/24. Edema: pulmonary edema reported. Abdomen: semi-soft with active bowel sounds. Last bowel movement: unknown. Labs: AM glucose 169, BUN 41, creatinine 4.29. A1c 8 (9/22). Drip: Heparin. Meds: Lantus, SSI coverage, Lasix. Current Nutrition Therapies:  
NPO. Anthropometric Measures: 
· Height:  5'6\" · Current Body Wt:  81.7 kg (180 lb 0.8 oz)(unknown wgt source 9/25/2020) · Ideal Body Wt:  64.5 kg · BMI:  29   
· BMI Category: Overweight Nutrition Diagnosis:  
· Inadequate oral intake related to cognitive or neurological impairment as evidenced by nutrition support-enteral nutrition. Nutrition Interventions:  
Food and/or Nutrient Delivery: Start tube feeding Coordination of Nutrition Care: Continued inpatient monitoring, discussed with Keon Royal, TEQUILA. Goals: 
Meet daily nutrition needs via TF and water flush to meet >75% of his needs within 5 days. Nutrition Monitoring and Evaluation:  
Food/Nutrient Intake Outcomes: Enteral nutrition intake/tolerance Physical Signs/Symptoms Outcomes: Biochemical data, GI status, Nausea/vomiting Discharge Planning: Too soon to determine Electronically signed by Brant Feldman RD, LD, [id number]  on 9/26/2020 at 2:04 PM 
Contact: [phone]

## 2020-09-26 NOTE — CONSULTS
Consult Patient: Ryan Dodd MRN: [id number] YOB: 1940  Age: 79 y.o. Sex: male Subjective:  
  
Ryan Dodd is a 79 y.o. male who is being seen for altered mental status. The patient presented to the hospital 4 days ago for ST elevation myocardial infarction involving the left anterior descending coronary artery. He was started on aspirin, heparin, and nitroglycerin prior to left heart cath. Impella device placed for cardiogenic shock. The patient was also placed in restraints shortly after due to confusion. He was also diagnosed with acute kidney injury which is thought to be a result of ATN from ischemia and contrast.  He was started on SLED. Past Medical History:  
Diagnosis Date  Diabetes ([facility] [facility]  Hypertension No past surgical history on file. No family history on file. Social History Tobacco Use  Smoking status: Never Smoker  Smokeless tobacco: Never Used Substance Use Topics  Alcohol use: Not on file Current Facility-Administered Medications Medication Dose Route Frequency Provider Last Rate Last Dose  hydrALAZINE (APRESOLINE) tablet 25 mg  25 mg Oral TID Opal Davis MD   Stopped at 09/25/20 0900  lip protectant (BLISTEX) ointment 1 Each  1 Each Topical PRN Johnie Leahy MD      
 furosemide (LASIX) injection 80 mg  80 mg IntraVENous Q12H Bard Chase Nunez MD   Stopped at 09/25/20 2100  
 LORazepam (ATIVAN) injection 1 mg  1 mg IntraVENous Q2H PRN Marcelino Zimmerman MD   1 mg at 09/26/20 0430  
 atorvastatin (LIPITOR) tablet 80 mg  80 mg Oral DAILY Marcelino Zimmerman MD   80 mg at 09/26/20 0811  
 insulin glargine (LANTUS) injection 20 Units  20 Units SubCUTAneous DAILY Sana Wing MD   20 Units at 09/26/20 0834  
 labetaloL (NORMODYNE;TRANDATE) injection 20 mg  20 mg IntraVENous Q6H PRN Marcelino Zimmerman MD      
  carvediloL (COREG) tablet 3.125 mg  3.125 mg Oral BID WITH MEALS Megha Davis MD   Stopped at 09/25/20 1700  
 sodium chloride (NS) flush 5-40 mL  5-40 mL IntraVENous Q8H Megha Davis MD   10 mL at 09/26/20 7362  sodium chloride (NS) flush 5-40 mL  5-40 mL IntraVENous PRN Megha Davis MD      
 acetaminophen (TYLENOL) tablet 650 mg  650 mg Oral Q4H PRN Megha Davis MD      
 morphine injection 2 mg  2 mg IntraVENous Q4H PRN Megha Davis MD   2 mg at 09/26/20 0429  
 nitroglycerin (NITROSTAT) tablet 0.4 mg  0.4 mg SubLINGual Q5MIN PRN Megha Davis MD      
 aspirin chewable tablet 81 mg  81 mg Oral DAILY Megha Davis MD   81 mg at 09/26/20 0811  
 HYDROcodone-acetaminophen (NORCO) 5-325 mg per tablet 1 Tab  1 Tab Oral Q4H PRN Megha Davis MD   1 Tab at 09/23/20 2035  ticagrelor (BRILINTA) tablet 90 mg  90 mg Oral Q12H Megha Davis MD   90 mg at 09/26/20 2519  
 heparin (porcine) 25,000 units in 0.45% saline 500 ml infusion  12-25 Units/kg/hr IntraVENous TITRATE Megha Davis MD 16.3 mL/hr at 09/26/20 1009 10 Units/kg/hr at 09/26/20 1009  heparin (porcine) 12,500 Units in dextrose 10% 1,000 mL for impella device  4-20 mL/hr IntraCATHeter- ARTerial TITRATE Megha Davis MD 7.7 mL/hr at 09/25/20 2002 7.7 mL/hr at 09/25/20 2002  heparinized saline 2 units/mL infusion  3 Units/hr IntraarTERial CONTINUOUS Megha Davis MD   Stopped at 09/22/20 1315  heparin (porcine) 10,000 unit/mL injection 1,000-10,000 Units  1,000-10,000 Units IntraVENous Multiple Megha Davis MD   4,000 Units at 09/22/20 1134  
 ondansetron [facility]) injection 4 mg  4 mg IntraVENous Q4H PRN Megha Davis MD   4 mg at 09/23/20 0745  insulin lispro (HUMALOG) injection   SubCUTAneous AC&HS Lord Leandra MD   2 Units at 09/26/20 9438  cefTRIAXone (ROCEPHIN) 1 g in 0.9% sodium chloride (MBP/ADV) 50 mL  1 g IntraVENous Q24H Chris Esteban  mL/hr at 09/25/20 1811 1 g at 09/25/20 1811 No Known Allergies Review of Systems: 
Could not obtain due to altered mental status Objective:  
 
Vitals:  
 09/26/20 0944 09/26/20 0959 09/26/20 1002 09/26/20 1014 BP: 104/69 107/70 107/70 102/64 Pulse: 87 87 87 87 Resp: (!) 37 30  15 Temp:      
SpO2: 96% 96%  98% Weight:      
  
 
Physical Exam: 
General -agitated. HEENT - Normocephalic, atraumatic. Conjunctiva, tympanic membranes, and oropharynx are clear. Neck - Supple without masses, no bruits Cardiovascular - Regular rate and rhythm. Normal S1, S2  
Lungs -wheezes and crackles Abdomen - Soft, nontender with normal bowel sounds. Extremities - Peripheral pulses intact. No edema and no rashes. Neurological examination Level of consciousness-agitated. Does not follow commands. Brain stem reflexes 
-Pupillary reflex to bright light: Pupils are equal round, and reactive to light 
-Ciliospinal reflexes: Normal 
-Oculovestibular and/or oculocephalic reflex response: Normal 
-Corneal reflex: Normal 
-Facial movement to painful stimulus at supraorbital nerve, temporomandibular joint: Present 
-Cough/gag reflex: Normal 
 
Motor examination 
-Muscle tone: Normal 
-Motor response to pain: Withdraws in all 4 limbs -Muscle stretch reflexes: 2+ at the brachioradialis and 1+ at the patella, bilaterally 
-Response to plantar stimulation: Downgoing to plantar stimulation, bilaterally. Lab Results Component Value Date/Time Cholesterol, total 132 09/23/2020 05:38 AM  
 HDL Cholesterol 48 09/23/2020 05:38 AM  
 LDL, calculated 51.4 09/23/2020 05:38 AM  
 VLDL, calculated 32.6 (H) 09/23/2020 05:38 AM  
 Triglyceride 163 (H) 09/23/2020 05:38 AM  
 CHOL/HDL Ratio 2.8 09/23/2020 05:38 AM  
  
 
Lab Results Component Value Date/Time Hemoglobin A1c 8.0 (H) 2020 05:18 PM  
  
 
 
 
Results for orders placed or performed during the hospital encounter of 20 EKG, 12 LEAD, INITIAL Result Value Ref Range Ventricular Rate 86 BPM  
 Atrial Rate 86 BPM  
 P-R Interval 144 ms QRS Duration 108 ms Q-T Interval 374 ms QTC Calculation (Bezet) 447 ms Calculated P Axis 57 degrees Calculated R Axis -79 degrees Calculated T Axis 88 degrees Diagnosis Normal sinus rhythm Left anterior fascicular block Anterior infarct (cited on or before 23-SEP-2020) Inferolateral injury pattern 
** ** ACUTE MI / STEMI ** ** Abnormal ECG When compared with ECG of 23-SEP-2020 06:13, Left anterior fascicular block is now Present 
persistent anterior apical injury current since 20 Confirmed by MIGUEL IBARRA (), [place] ([id number]) on 2020 10:55:53 AM 
  
  
 
 
Most recent Echo Results for orders placed or performed during the hospital encounter of 20  
2D ECHO COMPLETE ADULT (TTE) W OR WO CONTR [facility] [address] [place], [address] [facility] 
[phone] Transthoracic Echocardiogram 
2D, M-mode, Doppler, and Color Doppler [facility] 
MR #: [id number] :  Age: 79 years Gender: Male Study date: 22-Sep-2020 Account #: [de-identified] Height: 66 in 
Weight: 179.5 lb 
BSA: 1.91 mï¾² Status:Routine Location: 3109 BP: 124/ 87 Allergies: NO KNOWN ALLERGENS Sonographer:  Margret Salinas RD Group:  [address] Cardiology Referring Physician:  Jonny Monahan. MD Susan [facility] [place] Reading Physician:  DR. SALVATORE WILLOUGHBY, DO 
 
INDICATIONS: Cardiogenic shock. Impella *Patient scanned supine due to impella. * PROCEDURE: This was a routine study. A transthoracic echocardiogram was 
performed. The study included complete 2D imaging, M-mode, complete spectral 
Doppler, and color Doppler. Intravenous contrast (Definity) was administered. Echocardiographic views were limited by restricted patient mobility and poor 
acoustic window availability. This was a technically difficult study. LEFT VENTRICLE: Size was normal. Systolic function was markedly reduced. Ejection fraction was estimated in the range of 15 % to 20 %. There was  
severe 
diffuse hypokinesis. There was akinesis of the mid-apical anterior, mid 
anteroseptal, mid-apical inferior, apical septal, apical lateral, and apical 
wall(s). Wall thickness was normal. There was a mass on the apical wall, 
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 
thrombus. The study was not technically sufficient to allow evaluation of LV 
diastolic function. RIGHT VENTRICLE: The size was normal. Systolic function was mildly reduced. The 
tricuspid jet envelope definition was inadequate for estimation of RV  
systolic 
pressure. LEFT ATRIUM: The atrium was mildly dilated. RIGHT ATRIUM: Size was normal. 
 
SYSTEMIC VEINS: IVC: The inferior vena cava was normal in size. The 
respirophasic change in diameter was more than 50%. AORTIC VALVE: The valve was not well visualized. Inadequate data to assess 
regurgitation or velocities due to the impella placement. MITRAL VALVE: Not well visualized. There was no evidence for stenosis. TRICUSPID VALVE: Not well visualized. There was no evidence for stenosis. There 
was no regurgitation. PULMONIC VALVE: Not well visualized. PERICARDIUM: There was no pericardial effusion. AORTA: The root exhibited normal size. SUMMARY: 
 
-  Left ventricle: Systolic function was markedly reduced. Ejection fraction 
was estimated in the range of 15 % to 20 %. There was severe diffuse 
hypokinesis. There was akinesis of the mid-apical anterior, mid anteroseptal, 
mid-apical inferior, apical septal, apical lateral, and apical wall(s). There 
was a mass on the apical wall, associated with an akinetic segment which  
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 -  Right ventricle: The size was normal. Systolic function was mildly  
reduced. -  Inferior vena cava, hepatic veins: The inferior vena cava was normal in 
size. The respirophasic change in diameter was more than 50%. -  Pericardium: There was no pericardial effusion. 
 
-  Additional impressions: Impella device visualized in appropriate position  
in 
left ventricle. Findings communicated to primary cardiologist at time of 
dictation. Prepared and signed by 
 
DR. Dennis Noyola DO Signed 23-Sep-2020 11:11:43 
  
2D ECHO LIMTED ADULT W OR WO CONTR Narrative [place] [facility] [address] [place], [address] [facility] 
[phone] Transthoracic Echocardiogram 
2D and Doppler PatientColton Schilling 
MR #: [id number] : [id number] Age: 79 years Gender: Male Study date: 25-Sep-2020 Account #: [de-identified] Height: 66 in 
Weight: 180 lb BSA: 1.91 mï¾² Status:Routine Location: 3109 BP: 119/ 69 Allergies: NO KNOWN ALLERGENS Sonographer:  Jordan Henry RD Group:  [address] Cardiology Referring Physician:  Abel Davis MD [facility] Reading Physician:  Abel Davis MD [facility] INDICATIONS: Cardiogenic shock. *Impella was present and patient was scanned supine. * PROCEDURE: This was a routine study. A transthoracic echocardiogram was 
performed. The study included limited 2D imaging and limited spectral  
Doppler. Echocardiographic views were limited by restricted patient mobility and poor 
acoustic window availability. This was a technically difficult study. LEFT VENTRICLE: No apical clot seen. Systolic function was markedly reduced. Ejection fraction was estimated in the range of 15 % to 20 %. There was  
severe 
diffuse hypokinesis.  There was akinesis of the mid-apical anterior, mid 
anteroseptal, midapical inferior, apical septal, apical lateral, and apical 
 wall(s). There was a mass on the apicalwall, associated with an akinetic 
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 There was  
akinesis 
of the entire anterior, mid anteroseptal, and apical wall(s). SUMMARY: 
 
-  Left ventricle: No apical clot seen. Systolic function was markedly  
reduced. Ejection fraction was estimated in the range of 15 % to 20 %. There was  
severe 
diffuse hypokinesis. There was akinesis of the mid-apical anterior, mid 
anteroseptal, midapical inferior, apical septal, apical lateral, and apical 
wall(s). There was a mass on the apicalwall, associated with an akinetic 
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 There was  
akinesis 
of the entire anterior, mid anteroseptal, and apical wall(s). Prepared and signed by 
 
Edis Us. MD Susan [facility] Signed 25-Sep-2020 11:48:50 Assessment:  
 
61-year-old man with acute kidney injury and recent STEMI who has altered mental status. Neurological examination is nonfocal.  I suspect acute metabolic encephalopathy in the setting of systemic illness. Plan:  
 
Management of Delirium Non-pharmacological intervention · Reorient the patient throughout the day · Window open and lights on during the day. Lights off, television off, noises down during the night. If able, decrease nursing checks at night · Therapies as often as possible · Avoid restraints to the best of your ability · Avoid sensory deprivation by using glasses and hearing aids, if applicable Pharmacological intervention · Replete electrolyte abnormalities and correct metabolic abnormalities · Limit benzodiazepines, antihistamines, narcotics, anticholinergics. Preference towards Precedex for sedation over fentanyl and benzodiazepines/GABAa agonists. · For dangerous behavior/aggression to self or others can consider Zyprexa or Seroquel if benefits outweigh risk · For persistent insomnia can use melatonin four hours prior to bedtime or Seroquel 25 mg at bedtime Signed By: Kathy Anderson DO September 26, 2020

## 2020-09-26 NOTE — PROGRESS NOTES
Verbal and bedside shift report received from [facility]. Heparin gtt verified with outgoing RN.

## 2020-09-27 NOTE — PROGRESS NOTES
Verbal and bedside shift report received from [facility]. Heparin gtt verified with outgoing RN.

## 2020-09-27 NOTE — PROGRESS NOTES
9/27/2020 10:19 AM 
 
Admit Date: 9/22/2020 Admit Diagnosis: STEMI (ST elevation myocardial infarction) ([facility] [address] [I21.3] Subjective: More alert- still some confusion Objective:  
  
Visit Vitals /61 Pulse 98 Temp 99.9 °F (37.7 °C) Resp 13 Wt 180 lb (81.6 kg) SpO2 97% BMI 29.05 kg/m² Physical Exam: 
Basilio Freeman, Well Nourished, No Acute Distress, Alert & Oriented x 3, appropriate mood. Neck- supple, no JVD 
CV- regular rate and rhythm no MRG Lung- clear bilaterally Abd- soft, nontender, nondistended Ext- no edema bilaterally. Skin- warm and dry Data Review:  
Recent Labs  
  09/27/20 0419 09/27/20 0418   --   
K 4.6  --   
BUN 46*  --   
CREA 4.00*  --   
WBC  --  16.8* HGB  --  11.0*  
HCT  --  32.3*  
PLT  --  170 Assessment/Plan:  
 
Principal Problem: 
  Acute cystitis without hematuria (9/22/2020) Active Problems: 
  Acute ST elevation myocardial infarction (STEMI) involving left anterior descending (LAD) coronary artery ([address] (9/22/2020)Improved with current therapy. Will continue medications Hypertension (9/22/2020) Diabetes mellitus type 2, controlled ([address] (9/22/2020) Acute renal failure (ARF) ([address] (9/23/2020) Cardiogenic shock ([address] (9/25/2020)betetr but bp marginal- will stay at [facility] on impella and try to explant in am 
 
  Delirium (9/25/2020) NSVT (nonsustained ventricular tachycardia) ([address] (9/25/2020)Improved with current therapy. Will continue medications

## 2020-09-27 NOTE — PROGRESS NOTES
SWAN catheter noted to be further out than previously noted. When attempting to redress SWAN, sterile catheter noted to be loose and unable to be locked. SWAN easily manipulated. Bret Madera NP Cardiology called to the bedside. SWAN pulled by this RN with Milady's assistance. Patient tolerated well, no complications. Femoral venous sheath still in place.

## 2020-09-27 NOTE — PROGRESS NOTES
Request from family for  to visit  
patient was awake but did not converse during the visit  
family was calm thankful for the support given by the staff  
and also encouraged by the news that the patient is doing slightly better  
had prayer with the patient and family at bedside  
family is very thankful for all the support they have received since being with us 
 we assured them that our care will continue to the  highest level possible

## 2020-09-27 NOTE — PROGRESS NOTES
Neurology Daily Progress Note Assessment:  
 
Acute metabolic encephalopathy in the setting of systemic illness, improving. Admitted with acute kidney injury and recent STEMI who has altered mental status. Neurological examination is nonfocal. Patient more alert today. Oriented to person and place, not time. Follows simple 1 step commands. He was able to identify family at beside. Family updated at bedside. No additional neurological workup is needed at this time. Will sign off. Plan:  
 
Continue supportive therapy. Management of Delirium Non-pharmacological intervention · Reorient the patient throughout the day · Window open and lights on during the day. Lights off, television off, noises down during the night. If able, decrease nursing checks at night · Therapies as often as possible · Avoid restraints to the best of your ability · Avoid sensory deprivation by using glasses and hearing aids, if applicable Pharmacological intervention · Replete electrolyte abnormalities and correct metabolic abnormalities · Limit benzodiazepines, antihistamines, narcotics, anticholinergics. Preference towards Precedex for sedation over fentanyl and benzodiazepines/GABAa agonists. · For dangerous behavior/aggression to self or others can consider Zyprexa or Seroquel if benefits outweigh risk · For persistent insomnia can use melatonin four hours prior to bedtime or Seroquel 25 mg at bedtime Subjective: Interval history: 
 
Patient more alert today. Oriented to person and place, not time. Follows simple 1 step commands. He was able to identify family at beside. Family updated at bedside. History: 
 
Ryan Montano is a 79 y.o. male who is being seen for AMS. Review of systems negative with exception of pertinent positives and negatives noted above. Objective:  
 
Vitals:  
 09/27/20 1159 09/27/20 1200 09/27/20 1229 09/27/20 1259 BP: (!) 99/58 (!) 99/58 105/65 97/61 Pulse: 98 86 95 97 Resp: 12 18 26 11 Temp:      
SpO2: 99% 99% 97% 98% Weight:      
  
 
 
Current Facility-Administered Medications:  
  senna-docusate (PERICOLACE) 8.6-50 mg per tablet 1 Tab, 1 Tab, Per NG tube, DAILY, Jovanni Akers MD, 1 Tab at 09/27/20 9934   hydrALAZINE (APRESOLINE) tablet 25 mg, 25 mg, Oral, TID, Abel Davsi MD, Stopped at 09/25/20 0900   lip protectant (BLISTEX) ointment 1 Each, 1 Each, Topical, PRN, Mele Bhatti MD 
  LORazepam (ATIVAN) injection 1 mg, 1 mg, IntraVENous, Q2H PRN, Josee Royal MD, 1 mg at 09/26/20 0430 
  atorvastatin (LIPITOR) tablet 80 mg, 80 mg, Oral, DAILY, Josee Ryoal MD, 80 mg at 09/27/20 4074   insulin glargine (LANTUS) injection 20 Units, 20 Units, SubCUTAneous, DAILY, Troy Bass MD, 20 Units at 09/27/20 0834 
  labetaloL (NORMODYNE;TRANDATE) injection 20 mg, 20 mg, IntraVENous, Q6H PRN, Morena Myles MD 
  carvediloL (COREG) tablet 3.125 mg, 3.125 mg, Oral, BID WITH MEALS, Abel Davis MD, Stopped at 09/25/20 1700 
  sodium chloride (NS) flush 5-40 mL, 5-40 mL, IntraVENous, Q8H, Abel Davis MD, 10 mL at 09/27/20 3528   sodium chloride (NS) flush 5-40 mL, 5-40 mL, IntraVENous, PRN, Abel Davis MD 
  acetaminophen (TYLENOL) tablet 650 mg, 650 mg, Oral, Q4H PRN, Abel Davis MD, 650 mg at 09/27/20 6465   morphine injection 2 mg, 2 mg, IntraVENous, Q4H PRN, Abel Davis MD, 2 mg at 09/27/20 9860   nitroglycerin (NITROSTAT) tablet 0.4 mg, 0.4 mg, SubLINGual, Q5MIN PRN, Abel Davis MD 
  aspirin chewable tablet 81 mg, 81 mg, Oral, DAILY, Abel Davis MD, 81 mg at 09/27/20 0824 
  HYDROcodone-acetaminophen (NORCO) 5-325 mg per tablet 1 Tab, 1 Tab, Oral, Q4H PRN, Abel Davis MD, 1 Tab at 09/23/20 5083   ticagrelor (BRILINTA) tablet 90 mg, 90 mg, Oral, Q12H, Abel Davis MD, 90 mg at 09/27/20 3890   heparin (porcine) 25,000 units in 0.45% saline 500 ml infusion, 12-25 Units/kg/hr, IntraVENous, TITRATE, Kendy Davis MD, Last Rate: 8.2 mL/hr at 09/27/20 1205, 5 Units/kg/hr at 09/27/20 1205   heparin (porcine) 12,500 Units in dextrose 10% 1,000 mL for impella device, 4-20 mL/hr, IntraCATHeter- ARTerial, TITRATE, Kendy Davis MD, Last Rate: 7.7 mL/hr at 09/25/20 2002, 7.7 mL/hr at 09/25/20 2002   heparinized saline 2 units/mL infusion, 3 Units/hr, IntraarTERial, CONTINUOUS, Kendy Davis MD, Stopped at 09/22/20 1315   heparin (porcine) 10,000 unit/mL injection 1,000-10,000 Units, 1,000-10,000 Units, IntraVENous, Multiple, Kendy Davis MD, 4,000 Units at 09/22/20 1134 
  ondansetron [facility]) injection 4 mg, 4 mg, IntraVENous, Q4H PRN, Kendy Davis MD, 4 mg at 09/23/20 0745   insulin lispro (HUMALOG) injection, , SubCUTAneous, AC&HS, Edison Rey MD, 6 Units at 09/27/20 1205 Recent Results (from the past 12 hour(s)) POC ACTIVATED CLOTTING TIME Collection Time: 09/27/20  2:12 AM  
Result Value Ref Range Activated Clotting Time (POC) 164 (H) 70 - 128 SECS  
CBC WITH AUTOMATED DIFF Collection Time: 09/27/20  4:18 AM  
Result Value Ref Range WBC 16.8 (H) 4.3 - 11.1 K/uL  
 RBC 3.80 (L) 4.23 - 5.6 M/uL  
 HGB 11.0 (L) 13.6 - 17.2 g/dL HCT 32.3 (L) 41.1 - 50.3 % MCV 85.0 79.6 - 97.8 FL  
 MCH 28.9 26.1 - 32.9 PG  
 MCHC 34.1 31.4 - 35.0 g/dL  
 RDW 14.9 (H) 11.9 - 14.6 % PLATELET 108 219 - 043 K/uL MPV 12.2 9.4 - 12.3 FL ABSOLUTE NRBC 0.35 (H) 0.0 - 0.2 K/uL  
 DF AUTOMATED NEUTROPHILS 76 43 - 78 % LYMPHOCYTES 10 (L) 13 - 44 % MONOCYTES 11 4.0 - 12.0 % EOSINOPHILS 0 (L) 0.5 - 7.8 % BASOPHILS 1 0.0 - 2.0 % IMMATURE GRANULOCYTES 3 0.0 - 5.0 %  
 ABS. NEUTROPHILS 12.8 (H) 1.7 - 8.2 K/UL  
 ABS. LYMPHOCYTES 1.7 0.5 - 4.6 K/UL  
 ABS. MONOCYTES 1.8 (H) 0.1 - 1.3 K/UL  
 ABS. EOSINOPHILS 0.0 0.0 - 0.8 K/UL ABS. BASOPHILS 0.1 0.0 - 0.2 K/UL  
 ABS. IMM. GRANS. 0.5 0.0 - 0.5 K/UL  
LACTIC ACID Collection Time: 09/27/20  4:19 AM  
Result Value Ref Range Lactic acid 2.5 (HH) 0.4 - 2.0 MMOL/L  
METABOLIC PANEL, BASIC Collection Time: 09/27/20  4:19 AM  
Result Value Ref Range Sodium 138 136 - 145 mmol/L Potassium 4.6 3.5 - 5.1 mmol/L Chloride 100 98 - 107 mmol/L  
 CO2 28 21 - 32 mmol/L Anion gap 10 7 - 16 mmol/L Glucose 255 (H) 65 - 100 mg/dL BUN 46 (H) 8 - 23 MG/DL Creatinine 4.00 (H) 0.8 - 1.5 MG/DL  
 GFR est AA 19 (L) >60 ml/min/1.73m2 GFR est non-AA 15 (L) >60 ml/min/1.73m2 Calcium 9.0 8.3 - 10.4 MG/DL  
POC ACTIVATED CLOTTING TIME Collection Time: 09/27/20  4:19 AM  
Result Value Ref Range Activated Clotting Time (POC) 164 (H) 70 - 128 SECS  
POC ACTIVATED CLOTTING TIME Collection Time: 09/27/20  8:08 AM  
Result Value Ref Range Activated Clotting Time (POC) 158 (H) 70 - 128 SECS  
POC ACTIVATED CLOTTING TIME Collection Time: 09/27/20 12:05 PM  
Result Value Ref Range Activated Clotting Time (POC) 164 (H) 70 - 128 SECS Physical Exam: 
General -calm HEENT - Normocephalic, atraumatic. Conjunctiva, tympanic membranes, and oropharynx are clear. Neck - Supple without masses, no bruits Cardiovascular - Regular rate and rhythm. Normal S1, S2  
Lungs -wheezes and crackles Abdomen - Soft, nontender with normal bowel sounds. Extremities - Peripheral pulses intact. No edema and no rashes.  
  
Neurological examination 
  
Level of consciousness-Calm. Follows commands intermittently. 
  
Brain stem reflexes 
-Pupillary reflex to bright light: Pupils are equal round, and reactive to light 
-Ciliospinal reflexes: Normal 
-Oculovestibular and/or oculocephalic reflex response: Normal 
-Corneal reflex: Normal 
-Facial movement to painful stimulus at supraorbital nerve, temporomandibular joint: Present 
-Cough/gag reflex: Normal 
  
Motor examination -Muscle tone: Normal 
-Motor response to pain: Withdraws in all 4 limbs -Muscle stretch reflexes: 2+ at the brachioradialis and 1+ at the patella, bilaterally 
-Response to plantar stimulation: Downgoing to plantar stimulation, bilaterally. 
  
 
 
Signed By: Yanely Simon NP September 27, 2020

## 2020-09-27 NOTE — PROGRESS NOTES
Progress Note Patient: Norma Wellington MRN: [id number]  SSN: [id number] YOB: 1940  Age: 79 y.o. Sex: male Admit Date: 9/22/2020 LOS: 5 days Subjective:  
 
Norma Wellington is a 79 y.o. male who is being seen for consultation for DM management, MAURICIO and hyperkalemia.  
  
Patient has DM and hypertension. He speaks only [place] and daughter is on the phone helping with communication.  
  
Patient has been admitted on 9/22/2020 due to chest pain. He had STEMI and underwent PTCA.  
  
\" PROCEDURES PERFORMED: 1.  Left heart cath, selective coronary arteriography, and left ventriculogram via the right radial approach. 2.  PCI and stenting of the LAD. 3.  IVUS of LAD after stenting. 4.  Impella CP LVAD placement via the right common femoral artery. 5.  Placement of a continuous cardiac output Dow-Richard catheter via the left femoral vein. \"  
  
His BS is elevated. His creatinine is up after left heart catheterization and he has hyperkalemia and metabolic acidosis. Nephrology is consulted. Patient has made little urine. He has been on HD for the past 2 days with SLED. Patient tolerated it. Patient continues to be somnolent. Objective:  
 
Vitals:  
 09/27/20 9442 09/27/20 9321 09/27/20 0559 09/27/20 6978 BP: (!) 104/59 97/63 110/63 106/61 Pulse: 100 100 97 98 Resp: 28 15 12 13 Temp:      
SpO2: 95% 98% 92% 97% Weight:      
  
 
Intake and Output: 
Current Shift: No intake/output data recorded. Last three shifts: 09/25 1901 - 09/27 0700 In: 1675.5 [I.V.:812.5] Out: 8944 [Urine:38] Physical Exam:  
 
General:                    The patient is an elderly male in mild acute respiratory distress. somnolent. Daughter is at bedside. Patient is moaning and fidgeting at times. OOVC:                                   [id number]. Eyes:                                   palpebral pallor, no scleral icterus. ENT:                                    External auricular and nasal exam within normal limits.                                             [facility] membranes are moist. 
Neck:                                   Supple, non-tender, no JVD. Right IJ catheter is in place Lungs:                       decreased to auscultation bilaterally without wheezes or crackles.                                             No respiratory distress or accessory muscle use. Heart:                                  Regular rate and rhythm, without murmurs, rubs, or gallops. Abdomen:                  Soft, non-tender, mildly distended with normoactive bowel sounds. Genitourinary:           No tenderness over the bladder or bilateral CVAs. Wise's catheter in place. Extremities:               Without clubbing, cyanosis,mild edema. Right groin with catheter in place. Skin:                                    Normal color, texture, and turgor. No rashes, lesions, or jaundice. Pulses:                      Radial and dorsalis pedis pulses present 2+ bilaterally.  
                                            Capillary refill <2s. Neurologic:                CN II-XII grossly intact and symmetrical.  
                                            Moving all four extremities well with no focal deficits. Lab/Data Review: 
 
Recent Results (from the past 24 hour(s)) POC ACTIVATED CLOTTING TIME Collection Time: 09/26/20 10:03 AM  
Result Value Ref Range Activated Clotting Time (POC) 158 (H) 70 - 128 SECS  
GLUCOSE, POC Collection Time: 09/26/20 12:00 PM  
Result Value Ref Range Glucose (POC) 100 65 - 100 mg/dL POC ACTIVATED CLOTTING TIME Collection Time: 09/26/20 12:02 PM  
Result Value Ref Range Activated Clotting Time (POC) 164 (H) 70 - 128 SECS  
POC ACTIVATED CLOTTING TIME Collection Time: 09/26/20  2:12 PM  
Result Value Ref Range  Activated Clotting Time (POC) 169 (H) 70 - 128 SECS  
 POC ACTIVATED CLOTTING TIME Collection Time: 09/26/20  3:08 PM  
Result Value Ref Range Activated Clotting Time (POC) 169 (H) 70 - 128 SECS  
POC ACTIVATED CLOTTING TIME Collection Time: 09/26/20  5:05 PM  
Result Value Ref Range Activated Clotting Time (POC) 169 (H) 70 - 128 SECS  
POC ACTIVATED CLOTTING TIME Collection Time: 09/26/20  6:04 PM  
Result Value Ref Range Activated Clotting Time (POC) 164 (H) 70 - 128 SECS  
POC ACTIVATED CLOTTING TIME Collection Time: 09/26/20  7:04 PM  
Result Value Ref Range Activated Clotting Time (POC) 164 (H) 70 - 128 SECS  
POC ACTIVATED CLOTTING TIME Collection Time: 09/26/20  9:38 PM  
Result Value Ref Range Activated Clotting Time (POC) 169 (H) 70 - 128 SECS  
GLUCOSE, POC Collection Time: 09/26/20 10:46 PM  
Result Value Ref Range Glucose (POC) 206 (H) 65 - 100 mg/dL POC ACTIVATED CLOTTING TIME Collection Time: 09/26/20 10:49 PM  
Result Value Ref Range Activated Clotting Time (POC) 164 (H) 70 - 128 SECS  
POC ACTIVATED CLOTTING TIME Collection Time: 09/26/20 11:58 PM  
Result Value Ref Range Activated Clotting Time (POC) 164 (H) 70 - 128 SECS  
POC ACTIVATED CLOTTING TIME Collection Time: 09/27/20  2:12 AM  
Result Value Ref Range Activated Clotting Time (POC) 164 (H) 70 - 128 SECS  
CBC WITH AUTOMATED DIFF Collection Time: 09/27/20  4:18 AM  
Result Value Ref Range WBC 16.8 (H) 4.3 - 11.1 K/uL  
 RBC 3.80 (L) 4.23 - 5.6 M/uL  
 HGB 11.0 (L) 13.6 - 17.2 g/dL HCT 32.3 (L) 41.1 - 50.3 % MCV 85.0 79.6 - 97.8 FL  
 MCH 28.9 26.1 - 32.9 PG  
 MCHC 34.1 31.4 - 35.0 g/dL  
 RDW 14.9 (H) 11.9 - 14.6 % PLATELET 834 043 - 990 K/uL MPV 12.2 9.4 - 12.3 FL ABSOLUTE NRBC 0.35 (H) 0.0 - 0.2 K/uL  
 DF AUTOMATED NEUTROPHILS 76 43 - 78 % LYMPHOCYTES 10 (L) 13 - 44 % MONOCYTES 11 4.0 - 12.0 % EOSINOPHILS 0 (L) 0.5 - 7.8 % BASOPHILS 1 0.0 - 2.0 % IMMATURE GRANULOCYTES 3 0.0 - 5.0 % ABS. NEUTROPHILS 12.8 (H) 1.7 - 8.2 K/UL  
 ABS. LYMPHOCYTES 1.7 0.5 - 4.6 K/UL  
 ABS. MONOCYTES 1.8 (H) 0.1 - 1.3 K/UL  
 ABS. EOSINOPHILS 0.0 0.0 - 0.8 K/UL  
 ABS. BASOPHILS 0.1 0.0 - 0.2 K/UL  
 ABS. IMM. GRANS. 0.5 0.0 - 0.5 K/UL  
LACTIC ACID Collection Time: 09/27/20  4:19 AM  
Result Value Ref Range Lactic acid 2.5 (HH) 0.4 - 2.0 MMOL/L  
METABOLIC PANEL, BASIC Collection Time: 09/27/20  4:19 AM  
Result Value Ref Range Sodium 138 136 - 145 mmol/L Potassium 4.6 3.5 - 5.1 mmol/L Chloride 100 98 - 107 mmol/L  
 CO2 28 21 - 32 mmol/L Anion gap 10 7 - 16 mmol/L Glucose 255 (H) 65 - 100 mg/dL BUN 46 (H) 8 - 23 MG/DL Creatinine 4.00 (H) 0.8 - 1.5 MG/DL  
 GFR est AA 19 (L) >60 ml/min/1.73m2 GFR est non-AA 15 (L) >60 ml/min/1.73m2 Calcium 9.0 8.3 - 10.4 MG/DL  
POC ACTIVATED CLOTTING TIME Collection Time: 09/27/20  4:19 AM  
Result Value Ref Range Activated Clotting Time (POC) 164 (H) 70 - 128 SECS  
POC ACTIVATED CLOTTING TIME Collection Time: 09/27/20  8:08 AM  
Result Value Ref Range Activated Clotting Time (POC) 158 (H) 70 - 128 SECS  
 
XR chest  
9/23/2020 IMPRESSION: 
  
1. Improved aeration of the lungs, consistent with resolving pulmonary edema. XR chest  
9/24/2020 IMPRESSION: 
  
1. Progression of bilateral interstitial densities, likely pulmonary edema. Current Facility-Administered Medications:  
  senna-docusate (PERICOLACE) 8.6-50 mg per tablet 1 Tab, 1 Tab, Per NG tube, DAILY, Sinai Berg MD, 1 Tab at 09/27/20 5187   hydrALAZINE (APRESOLINE) tablet 25 mg, 25 mg, Oral, TID, Sandy Davis MD, Stopped at 09/25/20 0900   lip protectant (BLISTEX) ointment 1 Each, 1 Each, Topical, PRN, Letty Matta MD 
  LORazepam (ATIVAN) injection 1 mg, 1 mg, IntraVENous, Q2H PRN, Jose De La Torre MD, 1 mg at 09/26/20 0430 
  atorvastatin (LIPITOR) tablet 80 mg, 80 mg, Oral, DAILY, Jose De La Torre MD, 80 mg at 09/27/20 6856   insulin glargine (LANTUS) injection 20 Units, 20 Units, SubCUTAneous, DAILY, Troy Bass MD, 20 Units at 09/27/20 0834 
  labetaloL (NORMODYNE;TRANDATE) injection 20 mg, 20 mg, IntraVENous, Q6H PRN, Raheem Myles MD 
  carvediloL (COREG) tablet 3.125 mg, 3.125 mg, Oral, BID WITH MEALS, Nessmith, Vanice Cheadle, MD, Stopped at 09/25/20 1700 
  sodium chloride (NS) flush 5-40 mL, 5-40 mL, IntraVENous, Q8H, Nessmith, Vanice Cheadle, MD, 10 mL at 09/27/20 0810   sodium chloride (NS) flush 5-40 mL, 5-40 mL, IntraVENous, PRN, Nessmith, Vanice Cheadle, MD 
  acetaminophen (TYLENOL) tablet 650 mg, 650 mg, Oral, Q4H PRN, Nessmith, Vanice Cheadle, MD, 650 mg at 09/27/20 6903   morphine injection 2 mg, 2 mg, IntraVENous, Q4H PRN, Nessmith, Vanice Cheadle, MD, 2 mg at 09/27/20 1128   nitroglycerin (NITROSTAT) tablet 0.4 mg, 0.4 mg, SubLINGual, Q5MIN PRN, Nessmith, Vanice Cheadle, MD 
  aspirin chewable tablet 81 mg, 81 mg, Oral, DAILY, Nessmith, Vanice Cheadle, MD, 81 mg at 09/27/20 0824 
  HYDROcodone-acetaminophen (NORCO) 5-325 mg per tablet 1 Tab, 1 Tab, Oral, Q4H PRN, Nessmith, Vanice Cheadle, MD, 1 Tab at 09/23/20 2035   ticagrelor (BRILINTA) tablet 90 mg, 90 mg, Oral, Q12H, Nessmith, Vanice Cheadle, MD, 90 mg at 09/27/20 0617 
  heparin (porcine) 25,000 units in 0.45% saline 500 ml infusion, 12-25 Units/kg/hr, IntraVENous, TITRATE, Nessmith, Vanice Cheadle, MD, Last Rate: 8.2 mL/hr at 09/27/20 0812, 5 Units/kg/hr at 09/27/20 2050   heparin (porcine) 12,500 Units in dextrose 10% 1,000 mL for impella device, 4-20 mL/hr, IntraCATHeter- ARTerial, TITRATE, Nessmith, Vanice Cheadle, MD, Last Rate: 7.7 mL/hr at 09/25/20 2002, 7.7 mL/hr at 09/25/20 2002   heparinized saline 2 units/mL infusion, 3 Units/hr, IntraarTERial, CONTINUOUS, Nessmith, Vanice Cheadle, MD, Stopped at 09/22/20 1315   heparin (porcine) 10,000 unit/mL injection 1,000-10,000 Units, 1,000-10,000 Units, IntraVENous, Multiple, Nessmith, Vanice Cheadle, MD, 4,000 Units at 09/22/20 1134   ondansetron (ZOFRAN) injection 4 mg, 4 mg, IntraVENous, Q4H PRN, Linda Davis MD, 4 mg at 09/23/20 0784   insulin lispro (HUMALOG) injection, , SubCUTAneous, AC&HS, Efren Wise MD, 8 Units at 09/27/20 0282 Assessment:  
 
Principal Problem: 
  Acute cystitis without hematuria (9/22/2020) Active Problems: 
  Acute ST elevation myocardial infarction (STEMI) involving left anterior descending (LAD) coronary artery ([address] (9/22/2020) Hypertension (9/22/2020) Diabetes mellitus type 2, controlled ([address] (9/22/2020) Acute renal failure (ARF) ([address] (9/23/2020) Cardiogenic shock ([address] (9/25/2020) Delirium (9/25/2020) NSVT (nonsustained ventricular tachycardia) [address] (9/25/2020) Plan: STEMI  
S/P PTCA Cardiology is managing. BP on the low side. MAURICIO S/P PTCA, left heart catheterization Pulmonary edema Fluid overload Uremia Patient has been on HD for the past 3 days. Plan for HD again tomorrow. So far, no evidence of renal function return. Diabetes mellitus type 2 Monitor blood sugar. Cover with insulin sliding scale accordingly. Lantus is increased BS is improved and acceptable. Hypertension Monitor blood pressure and manage accordingly. BP is acceptable now. I have discussed the plan of care with patient and daughter at bedside. DVT prophylaxis : heparin IV already Signed By: Aranza Regalado MD   
 September 27, 2020

## 2020-09-27 NOTE — PROGRESS NOTES
[facility] PROGRESS NOTE Follow up for: 
 
Subjective:  
Patient seen and examined. Did well with SLED yesterday ROS:  UTO Objective:  
Exam: 
Vitals:  
 09/27/20 3164 09/27/20 0578 09/27/20 0559 09/27/20 7303 BP: (!) 104/59 97/63 110/63 106/61 Pulse: 100 100 97 98 Resp: 28 15 12 13 Temp:      
SpO2: 95% 98% 92% 97% Weight:      
 
 
 
Intake/Output Summary (Last 24 hours) at 9/27/2020 5429 Last data filed at 9/27/2020 7129 Gross per 24 hour Intake 929.48 ml Output 3052 ml Net -2122.52 ml Current Facility-Administered Medications Medication Dose Route Frequency  senna-docusate (PERICOLACE) 8.6-50 mg per tablet 1 Tab  1 Tab Per NG tube DAILY  hydrALAZINE (APRESOLINE) tablet 25 mg  25 mg Oral TID  lip protectant (BLISTEX) ointment 1 Each  1 Each Topical PRN  
 LORazepam (ATIVAN) injection 1 mg  1 mg IntraVENous Q2H PRN  
 atorvastatin (LIPITOR) tablet 80 mg  80 mg Oral DAILY  insulin glargine (LANTUS) injection 20 Units  20 Units SubCUTAneous DAILY  labetaloL (NORMODYNE;TRANDATE) injection 20 mg  20 mg IntraVENous Q6H PRN  
 carvediloL (COREG) tablet 3.125 mg  3.125 mg Oral BID WITH MEALS  sodium chloride (NS) flush 5-40 mL  5-40 mL IntraVENous Q8H  
 sodium chloride (NS) flush 5-40 mL  5-40 mL IntraVENous PRN  
 acetaminophen (TYLENOL) tablet 650 mg  650 mg Oral Q4H PRN  
 morphine injection 2 mg  2 mg IntraVENous Q4H PRN  
 nitroglycerin (NITROSTAT) tablet 0.4 mg  0.4 mg SubLINGual Q5MIN PRN  
 aspirin chewable tablet 81 mg  81 mg Oral DAILY  HYDROcodone-acetaminophen (NORCO) 5-325 mg per tablet 1 Tab  1 Tab Oral Q4H PRN  
 ticagrelor (BRILINTA) tablet 90 mg  90 mg Oral Q12H  
 heparin (porcine) 25,000 units in 0.45% saline 500 ml infusion  12-25 Units/kg/hr IntraVENous TITRATE  heparin (porcine) 12,500 Units in dextrose 10% 1,000 mL for impella device  4-20 mL/hr IntraCATHeter- ARTerial TITRATE  heparinized saline 2 units/mL infusion  3 Units/hr IntraarTERial CONTINUOUS  
 heparin (porcine) 10,000 unit/mL injection 1,000-10,000 Units  1,000-10,000 Units IntraVENous Multiple  ondansetron (ZOFRAN) injection 4 mg  4 mg IntraVENous Q4H PRN  
 insulin lispro (HUMALOG) injection   SubCUTAneous AC&HS  
 
 
EXAM 
GEN - sleeping, more alert per nursing CV - S1, S2, RRR, no rub, murmur, or gallop Lung - BS obscured by upper way sounds Abd - soft, nontender, BS present Ext - no edema Recent Labs  
  09/27/20 
0418 09/26/20 
0420 09/25/20 
1716 WBC 16.8* 19.1* 18.9* HGB 11.0* 10.8* 11.2* HCT 32.3* 31.7* 32.9*  
 157 159 Recent Labs  
  09/27/20 
0419 09/26/20 
0420 09/25/20 
0404  139 137  
K 4.6 4.4 4.0  
 102 101 CO2 28 30 26 BUN 46* 41* 72* CREA 4.00* 4.29* 6.49* CA 9.0 8.6 8.5 * 169* 205* MG  --   --  2.3 Assessment and Plan:  
1) MAURICIO-  Likely combination of ATN from ishcemia and contrast injury. Initiated on dialysis on the 24th. SLED the past 2 days. Will try patient on HD tomorrow. 2) Hyperkalemia-  better 3) Lactic Acidosis- resolved. 4) STEMI- s/p LHC. Heparin gtt. Impella 5)Pulmonary edema-  Better after UF past 2 days. 6) Gross hematuria- likely leon trauma with Heparin gtt.  
 
Christiano Sumner MD

## 2020-09-28 NOTE — PROGRESS NOTES
Discussed patient status with Aleyda Montgomery NP. Patient moaning, confused, unable to express and clarify if he's in pain. PRN morphine given, not much improvement. Orders received to start a precedex gtt, per neurology's recommendation.

## 2020-09-28 NOTE — PROGRESS NOTES
[facility] PROGRESS NOTE Follow up for: MAURICIO 
 
 
ROS:  UTO Objective:  
Exam: 
Vitals:  
 09/28/20 1395 09/28/20 6087 09/28/20 1087 09/28/20 0840 BP: (!) 96/53 (!) 91/54 (!) 90/52 (!) 90/52 Pulse: 87 90 84 87 Resp: 20 14 (!) 53 Temp: 97.9 °F (36.6 °C) SpO2: 99% 99% 92% Weight:      
 
 
 
Intake/Output Summary (Last 24 hours) at 9/28/2020 9735 Last data filed at 9/28/2020 2529 Gross per 24 hour Intake 2157.07 ml Output 5 ml Net 2152.07 ml Current Facility-Administered Medications Medication Dose Route Frequency  insulin glargine (LANTUS) injection 30 Units  30 Units SubCUTAneous DAILY  dexmedeTOMidine (PRECEDEX) 400 mcg in 0.9% sodium chloride 100 mL infusion  0.2-0.7 mcg/kg/hr IntraVENous TITRATE  senna-docusate (PERICOLACE) 8.6-50 mg per tablet 1 Tab  1 Tab Per NG tube DAILY  lip protectant (BLISTEX) ointment 1 Each  1 Each Topical PRN  
 LORazepam (ATIVAN) injection 1 mg  1 mg IntraVENous Q2H PRN  
 atorvastatin (LIPITOR) tablet 80 mg  80 mg Oral DAILY  labetaloL (NORMODYNE;TRANDATE) injection 20 mg  20 mg IntraVENous Q6H PRN  
 sodium chloride (NS) flush 5-40 mL  5-40 mL IntraVENous Q8H  
 sodium chloride (NS) flush 5-40 mL  5-40 mL IntraVENous PRN  
 acetaminophen (TYLENOL) tablet 650 mg  650 mg Oral Q4H PRN  
 morphine injection 2 mg  2 mg IntraVENous Q4H PRN  
 nitroglycerin (NITROSTAT) tablet 0.4 mg  0.4 mg SubLINGual Q5MIN PRN  
 aspirin chewable tablet 81 mg  81 mg Oral DAILY  HYDROcodone-acetaminophen (NORCO) 5-325 mg per tablet 1 Tab  1 Tab Oral Q4H PRN  
 ticagrelor (BRILINTA) tablet 90 mg  90 mg Oral Q12H  
 heparin (porcine) 25,000 units in 0.45% saline 500 ml infusion  12-25 Units/kg/hr IntraVENous TITRATE  heparin (porcine) 12,500 Units in dextrose 10% 1,000 mL for impella device  4-20 mL/hr IntraCATHeter- ARTerial TITRATE  heparinized saline 2 units/mL infusion  3 Units/hr IntraarTERial CONTINUOUS  
  heparin (porcine) 10,000 unit/mL injection 1,000-10,000 Units  1,000-10,000 Units IntraVENous Multiple  ondansetron (ZOFRAN) injection 4 mg  4 mg IntraVENous Q4H PRN  
 insulin lispro (HUMALOG) injection   SubCUTAneous AC&HS  
 
 
EXAM 
GEN - just moaning CV - S1, S2, RRR, no rub, murmur, or gallop Lung - CTA Abd - soft, nontender, BS present Ext - no edema Recent Labs  
  09/28/20 
0405 09/27/20 0418 09/26/20 
5279 WBC 15.7* 16.8* 19.1* HGB 9.3* 11.0* 10.8* HCT 27.6* 32.3* 31.7* * 170 157 Recent Labs  
  09/28/20 0405 09/27/20 0419 09/26/20 
8230  138 139  
K 4.5 4.6 4.4  
 100 102 CO2 26 28 30 * 46* 41* CREA 7.63* 4.00* 4.29* CA 8.4 9.0 8.6 * 255* 169* Assessment and Plan:  
1) MAURICIO-  Admission Cr 1.4. MAURICIO likely ATN from ischemic injury and contrast 
- Seen on SED for clearance and UF 
 
2) Hyperkalemia - resolved 3) Lactic Acidosis- resolved. 4) STEMI- s/p LHC. Impella removed today 5) Pulmonary edema-  Better after UF past 2 days. 6) Gross hematuria- likely leon trauma with Heparin gtt.  
 
Pieter Stanley MD

## 2020-09-28 NOTE — PROGRESS NOTES
Bedside, Verbal and Written shift change report given to [phone] (oncoming nurse) by Malorie Anderson and Faiza Hoskins RN (offgoing nurse). Report included the following information SBAR, Kardex, Procedure Summary, Intake/Output, MAR, Accordion, Recent Results, Med Rec Status, Cardiac Rhythm NSR and Alarm Parameters . Impella/groin/pulse check complete. Dual verified heparin gtt.

## 2020-09-28 NOTE — PROGRESS NOTES
rounded on patient with nurse. Interpreting services offered when needed. [facility] staff  available over the phone from 3:30 p.m. - midnight. Please call Kayla at [phone] with any interpreting requests. Ellie العلي CERTIFIED [facility] INTERPRETERTM Language Services Department 
[address]  [facility] 
[phone] (phone)

## 2020-09-28 NOTE — PROGRESS NOTES
Hospital  rounded over-the-phone with Nurse Aaron Acevedo Please consult the [facility] on-call calendar to find  on duty's contact information Thank you, Chris Coles CERTIFIED [facility]ALTHCARE INTERPRETERTM Language Services Department 
[address] 
[phone] (phone)

## 2020-09-28 NOTE — OP NOTES
[facility] CARDIOLOGY 
 
9/28/2020 9:51 AM 
 
Procedure: Removal of Impella left ventricular assist device. CPT code 56973 Indication: Hemodynamic stabilization post infarct. Stabilization of cardiogenic shock. Sedation:  1 mg Versed given by Stevenson Mitchell RN. Sedation start time 0946 with procedure completed 0185 Technical factors: Following support of 6 days, standard weaning protocol was employed and support was reduced by 2 P levels every 1 hour. Once the P level of P2 was achieved and satisfactory hemodynamic stability was observed, patient was brought to cardiac cath lab. Preclose had been done with two perclose devices at time of Impella insertion. Impella site prepped and draped and usual sterile fashion. Perclose sutures were identified. Sheath sutures were removed. The Impella device was removed from the ventricle by pulling the device back 20 cm. A flat motor current was noted and the patient was monitored for hemodynamic stability for approximately 2 minutes. The device was then pulled back into the femoral artery and removed with bleeding allowed to clear any clot around device. Perclose device was then deployed by standard fashion with good hemostasis. Pressure dressing applied. No complications. Doppler of distal pulses obtained with good flow CONCLUSIONS: Successful removal of Impella left ventricular assist device. Hold heparin for now. Will need to restart in AM given concerns of apical thrombus. Will need to transition to Coumadin.   
 
Tra Hernandez MD

## 2020-09-28 NOTE — PROGRESS NOTES
Comprehensive Nutrition Assessment Type and Reason for Visit: Reassess(management of TF for cardiology.) Nutrition Recommendations/Plan:  
Continue TF with Nepro @ 45 ml/hr and a 10 ml/hr water flush. Add 8 prosource tube feeding packets/day to meet revised protein needs. Administer 4 packets at 0900 and 4 packets at 2100. Flush with 50 ml free water following administration. At goal will provide 2264 kcal (100% estimated calorie needs), 175 grams protein (100% estimated protein needs) and 1123ml free fluid (100% of fluid needs). Nutrition Assessment:  Pt admitted s/p STEMI. S/P LHC, PCI, impella placement (9/22). HD was started on 9/24. PMH notable for HTN and DM. Pt off the floor at my time of visit this AM for impella extraction. 12 hour SLED SLED initiated this AM (held for transfer to cath lab) with plans to resume. Pt remains NPO. RN reports pt is taking in sips of water. NGT remains in place. GRVs 200, 180, 130 cc x last three checks. Pertinent labs: K+ 4.5, , Cr 7.63. Glucose 308 mg/dl; POC glucose 363 (9/28). Pertinent medications: precedex, 30 units Lantus (adjusted today), SSI, versed, daily pericolace; dulcolax x 1 given (9/26). Lasix d/c. Estimated Daily Nutrient Needs: 
Energy (kcal):  9643-7728 (30-35 kirt/kgIBW/day using 65 kg) Protein (g):  >163 gm/d (>2.5 gm/kg IBW 65 kg/d)(adjusted for SLED (9/28)) Fluid (ml/day):  minimal per renal guidelines Nutrition Related Findings:  Abdomen intact/soft with active bowel sounds. BM x 1 (9/27). Current Nutrition Therapies: 
Current Tube Feeding (TF) Orders: · Feeding Route: Nasogastric · Formula: Nepro · Schedule:Continuous · Regimen: 45 ml/hr · Additives/Modulars:   
· Water Flushes: 10 ml/hr · Current TF & Flush Orders Provides: 1944 kcal, 87 gm PRO, 1023 ml fluid/day · Goal TF & Flush Orders Provides: 2264 kcal, 175 gm PRO, 1123 ml fluid/day Anthropometric Measures: 
· Height:  5' 6\" (167.6 cm) · Current Body Wt:  81.7 kg (180 lb 0.8 oz)(unknown wgt source 9/25/2020) Body mass index is 29.05 kg/m². Nutrition Diagnosis:  
· Inadequate oral intake related to cognitive or neurological impairment as evidenced by nutrition support-enteral nutrition · Inadequate enteral nutrition infusion related to increased demand for energy/nutrients as evidenced by (initiation of SLED, meeting <100% of revised PRO needs.) Nutrition Interventions:  
Food and/or Nutrient Delivery: Modify tube feeding Coordination of Nutrition Care: (POC discussed with Rey Peña RN) Goals: 
Meet nutrient needs via TF and/or PO diet within 7 days. Nutrition Monitoring and Evaluation:  
Food/Nutrient Intake Outcomes: Diet advancement/tolerance, Enteral nutrition intake/tolerance Physical Signs/Symptoms Outcomes: Biochemical data, Chewing or swallowing, GI status, Hemodynamic status Discharge Planning: Too soon to determine Electronically signed by Magen Ace [address] [place] [address], [facility] [address], [address] 
on 9/28/2020 at 10:55 AM

## 2020-09-28 NOTE — PROGRESS NOTES
TRANSFER - IN REPORT: 
 
Verbal report received from Teresa Rivera RN(name) on Jailene Handler  being received from [facility](unit) for ordered procedure Report consisted of patients Situation, Background, Assessment and  
Recommendations(SBAR). Information from the following report(s) Procedure Summary and MAR was reviewed with the receiving nurse. Opportunity for questions and clarification was provided. Assessment completed upon patients arrival to unit and care assumed. Right groin site perclosed no hematoma noted site clean dry intact with gauze and tegaderm

## 2020-09-28 NOTE — PROGRESS NOTES
Progress Note Patient: Wendy Oakes MRN: [id number]  SSN: [id number] YOB: 1940  Age: 79 y.o. Sex: male Admit Date: 9/22/2020 LOS: 6 days Subjective:  
 
Wendy Oakes is a 79 y.o. male who is being seen for consultation for DM management, MAURICIO and hyperkalemia.  
  
Patient has DM and hypertension. He speaks only [address] and daughter is on the phone helping with communication.  
  
Patient has been admitted on 9/22/2020 due to chest pain. He had STEMI and underwent PTCA.  
  
\" PROCEDURES PERFORMED: 1.  Left heart cath, selective coronary arteriography, and left ventriculogram via the right radial approach. 2.  PCI and stenting of the LAD. 3.  IVUS of LAD after stenting. 4.  Impella CP LVAD placement via the right common femoral artery. 5.  Placement of a continuous cardiac output Fowler-Richard catheter via the left femoral vein. \"  
  
His BS is elevated. His creatinine is up after left heart catheterization and he has hyperkalemia and metabolic acidosis. Nephrology is consulted. Patient has made little urine. He has been on HD for the past 3 days with SLED. Patient tolerated it. Patient continues to be somnolent. Not responsive much to verbal command. BP is on the low side. EF is very low. Neurology is consulted to assist with mental status as well. Objective:  
 
Vitals:  
 09/28/20 7421 09/28/20 0840 09/28/20 8697 09/28/20 0654 BP: (!) 90/52 (!) 90/52 (!) 94/54 Pulse: 84 87 87 86 Resp: (!) 53  (!) 39 10 Temp:      
SpO2: 92%  97% 97% Weight:      
  
 
Intake and Output: 
Current Shift: 09/28 0701 - 09/28 1900 In: 300 Out: 0 Last three shifts: 09/26 1901 - 09/28 0700 In: 2530.1 [I.V.:621.1] Out: 707 [Urine:10] Physical Exam:  
 
General:                    The patient is an elderly male in mild acute respiratory distress. somnolent. Daughter is at bedside. XFL:                                   [id number]. Eyes:                                   palpebral pallor, no scleral icterus. ENT:                                    External auricular and nasal exam within normal limits.                                             MYQUMS membranes are moist. 
Neck:                                   Supple, non-tender, no JVD. Right IJ catheter is in place Lungs:                       decreased to auscultation bilaterally without wheezes or crackles.                                             No respiratory distress or accessory muscle use. Heart:                                  Regular rate and rhythm, without murmurs, rubs, or gallops. Abdomen:                  Soft, non-tender, mildly distended with normoactive bowel sounds. Genitourinary:           No tenderness over the bladder or bilateral CVAs. Wise's catheter in place. Extremities:               Without clubbing, cyanosis,mild edema. Right groin with catheter in place. Skin:                                    Normal color, texture, and turgor. No rashes, lesions, or jaundice. Pulses:                      Radial and dorsalis pedis pulses present 2+ bilaterally.  
                                            Capillary refill <2s. Neurologic:                CN II-XII grossly intact and symmetrical.  
                                            observed Moving all four extremities well with no focal deficits although he does not move his extremities much. Lab/Data Review: 
 
Recent Results (from the past 24 hour(s)) POC ACTIVATED CLOTTING TIME Collection Time: 09/27/20 12:05 PM  
Result Value Ref Range Activated Clotting Time (POC) 164 (H) 70 - 128 SECS  
GLUCOSE, POC Collection Time: 09/27/20  4:11 PM  
Result Value Ref Range Glucose (POC) 294 (H) 65 - 100 mg/dL POC ACTIVATED CLOTTING TIME Collection Time: 09/27/20  4:13 PM  
Result Value Ref Range  Activated Clotting Time (POC) 164 (H) 70 - 128 SECS  
POC ACTIVATED CLOTTING TIME  
 Collection Time: 09/27/20  8:18 PM  
Result Value Ref Range Activated Clotting Time (POC) 158 (H) 70 - 128 SECS  
GLUCOSE, POC Collection Time: 09/27/20  9:57 PM  
Result Value Ref Range Glucose (POC) 289 (H) 65 - 100 mg/dL POC ACTIVATED CLOTTING TIME Collection Time: 09/28/20 12:19 AM  
Result Value Ref Range Activated Clotting Time (POC) 169 (H) 70 - 128 SECS  
POC ACTIVATED CLOTTING TIME Collection Time: 09/28/20  1:30 AM  
Result Value Ref Range Activated Clotting Time (POC) 164 (H) 70 - 128 SECS  
POC ACTIVATED CLOTTING TIME Collection Time: 09/28/20  2:16 AM  
Result Value Ref Range Activated Clotting Time (POC) 164 (H) 70 - 128 SECS  
LACTIC ACID Collection Time: 09/28/20  4:05 AM  
Result Value Ref Range Lactic acid 1.9 0.4 - 2.0 MMOL/L  
CBC WITH AUTOMATED DIFF Collection Time: 09/28/20  4:05 AM  
Result Value Ref Range WBC 15.7 (H) 4.3 - 11.1 K/uL  
 RBC 3.20 (L) 4.23 - 5.6 M/uL HGB 9.3 (L) 13.6 - 17.2 g/dL HCT 27.6 (L) 41.1 - 50.3 % MCV 86.3 79.6 - 97.8 FL  
 MCH 29.1 26.1 - 32.9 PG  
 MCHC 33.7 31.4 - 35.0 g/dL  
 RDW 15.8 (H) 11.9 - 14.6 % PLATELET 310 (L) 549 - 450 K/uL MPV 11.9 9.4 - 12.3 FL ABSOLUTE NRBC 0.26 (H) 0.0 - 0.2 K/uL  
 DF AUTOMATED NEUTROPHILS 77 43 - 78 % LYMPHOCYTES 10 (L) 13 - 44 % MONOCYTES 10 4.0 - 12.0 % EOSINOPHILS 2 0.5 - 7.8 % BASOPHILS 0 0.0 - 2.0 % IMMATURE GRANULOCYTES 2 0.0 - 5.0 %  
 ABS. NEUTROPHILS 12.1 (H) 1.7 - 8.2 K/UL  
 ABS. LYMPHOCYTES 1.5 0.5 - 4.6 K/UL  
 ABS. MONOCYTES 1.5 (H) 0.1 - 1.3 K/UL  
 ABS. EOSINOPHILS 0.2 0.0 - 0.8 K/UL  
 ABS. BASOPHILS 0.0 0.0 - 0.2 K/UL  
 ABS. IMM. GRANS. 0.3 0.0 - 0.5 K/UL METABOLIC PANEL, BASIC Collection Time: 09/28/20  4:05 AM  
Result Value Ref Range Sodium 137 136 - 145 mmol/L Potassium 4.5 3.5 - 5.1 mmol/L Chloride 100 98 - 107 mmol/L  
 CO2 26 21 - 32 mmol/L Anion gap 11 7 - 16 mmol/L Glucose 308 (H) 65 - 100 mg/dL  (H) 8 - 23 MG/DL Creatinine 7.63 (H) 0.8 - 1.5 MG/DL  
 GFR est AA 9 (L) >60 ml/min/1.73m2 GFR est non-AA 7 (L) >60 ml/min/1.73m2 Calcium 8.4 8.3 - 10.4 MG/DL  
POC ACTIVATED CLOTTING TIME Collection Time: 09/28/20  4:11 AM  
Result Value Ref Range Activated Clotting Time (POC) 164 (H) 70 - 128 SECS  
GLUCOSE, POC Collection Time: 09/28/20  8:36 AM  
Result Value Ref Range Glucose (POC) 363 (H) 65 - 100 mg/dL POC ACTIVATED CLOTTING TIME Collection Time: 09/28/20  8:37 AM  
Result Value Ref Range Activated Clotting Time (POC) 158 (H) 70 - 128 SECS  
 
XR chest  
9/23/2020 IMPRESSION: 
  
1. Improved aeration of the lungs, consistent with resolving pulmonary edema. XR chest  
9/24/2020 IMPRESSION: 
  
1. Progression of bilateral interstitial densities, likely pulmonary edema. Current Facility-Administered Medications:  
  insulin glargine (LANTUS) injection 30 Units, 30 Units, SubCUTAneous, DAILY, Troy Bass MD, 30 Units at 09/28/20 0839 
  dexmedeTOMidine (PRECEDEX) 400 mcg in 0.9% sodium chloride 100 mL infusion, 0.2-0.7 mcg/kg/hr, IntraVENous, TITRATE, Jaylan ESTRADA, NP, Last Rate: 8.2 mL/hr at 09/28/20 0800, 0.4 mcg/kg/hr at 09/28/20 0800 
  senna-docusate (PERICOLACE) 8.6-50 mg per tablet 1 Tab, 1 Tab, Per NG tube, DAILY, Christel Schirmer, MD, 1 Tab at 09/28/20 3293   lip protectant (BLISTEX) ointment 1 Each, 1 Each, Topical, PRN, Colt Clark MD 
  LORazepam (ATIVAN) injection 1 mg, 1 mg, IntraVENous, Q2H PRN, Delmi Jung MD, 1 mg at 09/26/20 0430 
  atorvastatin (LIPITOR) tablet 80 mg, 80 mg, Oral, DAILY, Brian Myles MD, 80 mg at 09/28/20 0839 
  labetaloL (NORMODYNE;TRANDATE) injection 20 mg, 20 mg, IntraVENous, Q6H PRN, Brian Myles MD 
  sodium chloride (NS) flush 5-40 mL, 5-40 mL, IntraVENous, Q8H, Queenie Davis MD, 10 mL at 09/28/20 6483   sodium chloride (NS) flush 5-40 mL, 5-40 mL, IntraVENous, PRN, Sanna Davis MD 
  acetaminophen (TYLENOL) tablet 650 mg, 650 mg, Oral, Q4H PRN, Sanna Davis MD, 650 mg at 09/27/20 9673   morphine injection 2 mg, 2 mg, IntraVENous, Q4H PRN, Sanna Davis MD, 2 mg at 09/27/20 2055   nitroglycerin (NITROSTAT) tablet 0.4 mg, 0.4 mg, SubLINGual, Q5MIN PRN, Sanna Davis MD 
  aspirin chewable tablet 81 mg, 81 mg, Oral, DAILY, Sanna Davis MD, 81 mg at 09/28/20 0839 
  HYDROcodone-acetaminophen (NORCO) 5-325 mg per tablet 1 Tab, 1 Tab, Oral, Q4H PRN, Sanna Davis MD, 1 Tab at 09/23/20 2035   ticagrelor (BRILINTA) tablet 90 mg, 90 mg, Oral, Q12H, Sanna Davis MD, 90 mg at 09/28/20 0539 
  heparin (porcine) 25,000 units in 0.45% saline 500 ml infusion, 12-25 Units/kg/hr, IntraVENous, TITRATE, Sanna Davis MD, Last Rate: 6.5 mL/hr at 09/28/20 0846, 4 Units/kg/hr at 09/28/20 0846 
  heparin (porcine) 12,500 Units in dextrose 10% 1,000 mL for impella device, 4-20 mL/hr, IntraCATHeter- ARTerial, TITRATE, Sanna Davis MD, Last Rate: 7.7 mL/hr at 09/25/20 2002, 7.7 mL/hr at 09/25/20 2002   heparinized saline 2 units/mL infusion, 3 Units/hr, IntraarTERial, CONTINUOUS, Sanna Davis MD, Stopped at 09/22/20 1315   heparin (porcine) 10,000 unit/mL injection 1,000-10,000 Units, 1,000-10,000 Units, IntraVENous, Multiple, Sanna Davis MD, 4,000 Units at 09/22/20 1134 
  ondansetron [facility] injection 4 mg, 4 mg, IntraVENous, Q4H PRN, Sanna Davis MD, 4 mg at 09/23/20 0745   insulin lispro (HUMALOG) injection, , SubCUTAneous, AC&HS, Maria Luz Deluna MD, 10 Units at 09/28/20 0730 Assessment:  
 
Principal Problem: 
  Acute cystitis without hematuria (9/22/2020) Active Problems: 
  Acute ST elevation myocardial infarction (STEMI) involving left anterior descending (LAD) coronary artery ([facility] [address] (9/22/2020) Hypertension (9/22/2020) Diabetes mellitus type 2, controlled ([facility] [address] (9/22/2020) Acute renal failure (ARF) ([facility] [address] (9/23/2020) Cardiogenic shock ([facility] [address] (9/25/2020) Delirium (9/25/2020) NSVT (nonsustained ventricular tachycardia) ([facility] [address] (9/25/2020) Plan: STEMI  
S/P PTCA Cardiology is managing. BP on the low side. Poor EF. MAURICIO S/P PTCA, left heart catheterization Pulmonary edema Fluid overload Uremia Patient has been on HD for the past 3 sessions. Plan for HD again today? So far, no evidence of renal function return. Diabetes mellitus type 2 Monitor blood sugar. Cover with insulin sliding scale accordingly. Lantus is increased BS is fluctuating. Hypertension Monitor blood pressure and manage accordingly. BP is mostly low. I have discussed the plan of care daughters at bedside. DVT prophylaxis : heparin IV already Signed By: Lee Mcgraw MD   
 September 28, 2020

## 2020-09-28 NOTE — PROGRESS NOTES
9/28/2020 8:12 AM 
 
Admit Date: 9/22/2020 Admit Diagnosis: STEMI (ST elevation myocardial infarction) ([facility] [address] [I21.3] Subjective: Following commands- not moving left arm- non-verbal 
 
 
Objective:  
  
Visit Vitals BP (!) 96/53 (BP 1 Location: Left arm, BP Patient Position: At rest) Pulse 87 Temp 97.9 °F (36.6 °C) Resp 20 Wt 180 lb (81.6 kg) SpO2 99% BMI 29.05 kg/m² Physical Exam: 
Mallie Susan, Well Nourished, No Acute Distress, Alert & Oriented x 3, appropriate mood. Neck- supple, no JVD 
CV- regular rate and rhythm no MRG Lung- clear bilaterally Abd- soft, nontender, nondistended Ext- no edema bilaterally. Skin- warm and dry Data Review:  
Recent Labs  
  09/28/20 
0405   
K 4.5 * CREA 7.63* WBC 15.7* HGB 9.3* HCT 27.6*  
* Assessment/Plan:  
 
Principal Problem: 
  Acute cystitis without hematuria (9/22/2020) Active Problems: 
  Acute ST elevation myocardial infarction (STEMI) involving left anterior descending (LAD) coronary artery ([facility]) (9/22/2020)Stable. Continue current medical therapy. Hypertension (9/22/2020) Diabetes mellitus type 2, controlled ([address] (9/22/2020) Acute renal failure (ARF) ([address] (9/23/2020) Cardiogenic shock ([address] (9/25/2020)improved- on P2- will remove impella-discussed with pt family the need and risks Delirium (9/25/2020) NSVT (nonsustained ventricular tachycardia) ([address] (9/25/2020)

## 2020-09-28 NOTE — PROGRESS NOTES
TRANSFER - OUT REPORT: 
 
Verbal report given to Chloe Matt RN(name) on Brayan Mitchell  being transferred to [facility]unit[facility] for ordered procedure impella explant Report consisted of patients Situation, Background, Assessment and  
Recommendations(SBAR). Information from the following report(s) SBAR, Kardex, Intake/Output, MAR, Recent Results and Cardiac Rhythm SR 80's ST elevation was reviewed with the receiving nurse. Lines:  
Peripheral IV Left Forearm (Active) Site Assessment Clean, dry, & intact 09/28/20 0359 Phlebitis Assessment 0 09/28/20 0359 Infiltration Assessment 0 09/28/20 0359 Dressing Status Clean, dry, & intact 09/27/20 1900 Dressing Type Transparent;Tape 09/27/20 1900 Hub Color/Line Status Green;Flushed;Patent 09/27/20 1900 Alcohol Cap Used No 09/27/20 0729 Peripheral IV 09/23/20 Left Forearm (Active) Site Assessment Clean, dry, & intact 09/28/20 0359 Phlebitis Assessment 0 09/28/20 0359 Infiltration Assessment 0 09/28/20 0359 Dressing Status Clean, dry, & intact 09/27/20 1900 Dressing Type Tape;Transparent 09/27/20 1900 Hub Color/Line Status Pink;Flushed;Patent 09/27/20 1900 Alcohol Cap Used No 09/27/20 0729 Opportunity for questions and clarification was provided. Patient transported with: 
 Monitor Registered Nurse Tech

## 2020-09-28 NOTE — PROGRESS NOTES
Chart reviewed and pt discussed in am IDR. Remains ICU. Impella pulled this am by MD. CRRT per Nephrology. Pt has poor prognosis per MD notes. Followed by Cardiology, Nephrology, Hospitalist, and now Neurology. No payor source and pt not citizen. This will make difficult d/c planning. Family supportive and remains at bedside. IRC could be possible if pt could tolerate when stable. EF15-20%. CM to follow for any assist and d/c needs/POC when stable.

## 2020-09-28 NOTE — PROGRESS NOTES
Hospital  rounded over-the-phone with Staff Karel Zambrano) Please consult the [facility] on-call calendar to find  on duty's contact information Thank you, Viktoria Baig CERTIFIED HEALTHCARE INTERPRETERTM Language Services Department 
[address]  [facility] 
[phone] (phone)

## 2020-09-28 NOTE — INTERDISCIPLINARY ROUNDS
Interdisciplinary team rounds were held 9/28/2020 with the following team members:Care Management, Nursing, Nurse Practitioner, Palliative Care, Pastoral Care, Pharmacy, Physical Therapy, Physician, Respiratory Therapy and Clinical Coordinator. Plan of care discussed. See clinical pathway and/or care plan for interventions and desired outcomes.

## 2020-09-29 NOTE — PROGRESS NOTES
Progress Note Patient: Lc Murphy MRN: [id number]  SSN: [id number] YOB: 1940  Age: 79 y.o. Sex: male Admit Date: 9/22/2020 LOS: 7 days Subjective:  
 
Lc Murphy is a 79 y.o. male who is being seen for consultation for DM management, MAURICIO and hyperkalemia.  
  
Patient has DM and hypertension. He speaks only [place] and daughter is on the phone helping with communication.  
  
Patient has been admitted on 9/22/2020 due to chest pain. He had STEMI and underwent PTCA.  
  
\" PROCEDURES PERFORMED: 1.  Left heart cath, selective coronary arteriography, and left ventriculogram via the right radial approach. 2.  PCI and stenting of the LAD. 3.  IVUS of LAD after stenting. 4.  Impella CP LVAD placement via the right common femoral artery. 5.  Placement of a continuous cardiac output [place]-Richard catheter via the left femoral vein. \"  
  
His BS is elevated. His creatinine is up after left heart catheterization and he has hyperkalemia and metabolic acidosis. Nephrology is consulted. Patient has made little urine. He has been on HD for the past 3 days with SLED. Patient tolerated it. Interval History (9/29): patient examined at bedside. Impella taken out yesterday but patient's BP has been low to the point that Levophed had to be added by cardiology. Family currently at bedside. Patient denies chest pain or shortness of breath. Has some waxing/waning mentation that family says is improving. Objective:  
 
Vitals:  
 09/29/20 1444 09/29/20 1459 09/29/20 1514 09/29/20 1529 BP: 108/66 108/67 108/68 (!) 98/56 Pulse: 94 97 95 93 Resp:  14 24 10 Temp:      
SpO2: 98% 98% 97% 100% Weight:      
Height:      
  
 
Intake and Output: 
Current Shift: 09/29 0701 - 09/29 1900 In: 340 Out: - Last three shifts: 09/27 1901 - 09/29 0700 In: 3580.9 [I.V.:700.9] Out: 2092 Physical Exam: General:                    The patient is an elderly male in no apparent distress MGYM:                                   TIAYOZTVXBEIO/VUHXTVSOVZ. Eyes:                                   palpebral pallor, no scleral icterus. ENT:                                    External auricular and nasal exam within normal limits.                                             LCTRBT membranes are moist. 
Neck:                                   Supple, non-tender, no JVD. Right IJ catheter is in place Lungs:                       decreased to auscultation bilaterally without wheezes or crackles.                                             No respiratory distress or accessory muscle use. Heart:                                  Regular rate and rhythm, without murmurs, rubs, or gallops. Abdomen:                  Soft, non-tender, mildly distended with normoactive bowel sounds. Genitourinary:           No tenderness over the bladder or bilateral CVAs. Wise's catheter in place. Extremities:               Without clubbing, cyanosis,mild edema. Right groin with catheter in place. Skin:                                    Normal color, texture, and turgor. No rashes, lesions, or jaundice. Pulses:                      Radial and dorsalis pedis pulses present 2+ bilaterally.  
                                            Capillary refill <2s. Neurologic:                CN II-XII grossly intact and symmetrical.  
                                            observed Moving all four extremities well with no focal deficits Lab/Data Review: 
 
Recent Results (from the past 24 hour(s)) GLUCOSE, POC Collection Time: 09/28/20  5:31 PM  
Result Value Ref Range Glucose (POC) 205 (H) 65 - 100 mg/dL GLUCOSE, POC Collection Time: 09/28/20  9:33 PM  
Result Value Ref Range Glucose (POC) 184 (H) 65 - 100 mg/dL LACTIC ACID Collection Time: 09/29/20  4:00 AM  
Result Value Ref Range Lactic acid 1.6 0.4 - 2.0 MMOL/L  
METABOLIC PANEL, BASIC Collection Time: 09/29/20  4:00 AM  
Result Value Ref Range Sodium 138 136 - 145 mmol/L Potassium 4.6 3.5 - 5.1 mmol/L Chloride 101 98 - 107 mmol/L  
 CO2 30 21 - 32 mmol/L Anion gap 7 7 - 16 mmol/L Glucose 294 (H) 65 - 100 mg/dL BUN 46 (H) 8 - 23 MG/DL Creatinine 3.57 (H) 0.8 - 1.5 MG/DL  
 GFR est AA 21 (L) >60 ml/min/1.73m2 GFR est non-AA 18 (L) >60 ml/min/1.73m2 Calcium 8.5 8.3 - 10.4 MG/DL  
CBC WITH AUTOMATED DIFF Collection Time: 09/29/20  4:00 AM  
Result Value Ref Range WBC 14.4 (H) 4.3 - 11.1 K/uL  
 RBC 2.85 (L) 4.23 - 5.6 M/uL HGB 8.4 (L) 13.6 - 17.2 g/dL HCT 24.6 (L) 41.1 - 50.3 % MCV 86.3 79.6 - 97.8 FL  
 MCH 29.5 26.1 - 32.9 PG  
 MCHC 34.1 31.4 - 35.0 g/dL  
 RDW 15.4 (H) 11.9 - 14.6 % PLATELET 883 (L) 575 - 450 K/uL MPV 12.4 (H) 9.4 - 12.3 FL ABSOLUTE NRBC 0.14 0.0 - 0.2 K/uL  
 DF AUTOMATED NEUTROPHILS 66 43 - 78 % LYMPHOCYTES 14 13 - 44 % MONOCYTES 15 (H) 4.0 - 12.0 % EOSINOPHILS 2 0.5 - 7.8 % BASOPHILS 0 0.0 - 2.0 % IMMATURE GRANULOCYTES 3 0.0 - 5.0 %  
 ABS. NEUTROPHILS 9.5 (H) 1.7 - 8.2 K/UL  
 ABS. LYMPHOCYTES 2.0 0.5 - 4.6 K/UL  
 ABS. MONOCYTES 2.2 (H) 0.1 - 1.3 K/UL  
 ABS. EOSINOPHILS 0.3 0.0 - 0.8 K/UL  
 ABS. BASOPHILS 0.0 0.0 - 0.2 K/UL  
 ABS. IMM. GRANS. 0.4 0.0 - 0.5 K/UL GLUCOSE, POC Collection Time: 09/29/20  8:31 AM  
Result Value Ref Range Glucose (POC) 390 (H) 65 - 100 mg/dL GLUCOSE, POC Collection Time: 09/29/20 12:03 PM  
Result Value Ref Range Glucose (POC) 311 (H) 65 - 100 mg/dL GLUCOSE, POC Collection Time: 09/29/20  4:40 PM  
Result Value Ref Range Glucose (POC) 273 (H) 65 - 100 mg/dL XR chest  
9/23/2020 IMPRESSION: 
  
1. Improved aeration of the lungs, consistent with resolving pulmonary edema. XR chest  
9/24/2020 IMPRESSION: 
  
1.  Progression of bilateral interstitial densities, likely pulmonary edema. 
 
 
Current Facility-Administered Medications:  
  insulin glargine (LANTUS) injection 33 Units, 33 Units, SubCUTAneous, DAILY, Ciesco, Brayan, DO, 33 Units at 09/29/20 0841 
  NOREPINephrine (LEVOPHED) 4 mg in 5% dextrose 250 mL infusion, 0.5-16 mcg/min, IntraVENous, TITRATE, Anu Albright MD, Last Rate: 18.8 mL/hr at 09/29/20 1649, 5 mcg/min at 09/29/20 1649 
  insulin lispro (HUMALOG) injection, , SubCUTAneous, AC&HS, Ciesco, Brayan, DO, 9 Units at 09/29/20 1643   dexmedeTOMidine (PRECEDEX) 400 mcg in 0.9% sodium chloride 100 mL infusion, 0.2-0.7 mcg/kg/hr, IntraVENous, TITRATE, Kostas ESTRADA NP, Last Rate: 10.2 mL/hr at 09/29/20 1204, 0.5 mcg/kg/hr at 09/29/20 1204   senna-docusate (PERICOLACE) 8.6-50 mg per tablet 1 Tab, 1 Tab, Per NG tube, DAILY, Anu Albright MD, 1 Tab at 09/29/20 8328   lip protectant (BLISTEX) ointment 1 Each, 1 Each, Topical, PRN, Wyatt Vega MD 
  LORazepam (ATIVAN) injection 1 mg, 1 mg, IntraVENous, Q2H PRN, Max Wilhelm MD, 1 mg at 09/26/20 0430 
  atorvastatin (LIPITOR) tablet 80 mg, 80 mg, Oral, DAILY, Zhanna Myles MD, 80 mg at 09/29/20 0841 
  labetaloL (NORMODYNE;TRANDATE) injection 20 mg, 20 mg, IntraVENous, Q6H PRN, Zhanna Myles MD 
  sodium chloride (NS) flush 5-40 mL, 5-40 mL, IntraVENous, Q8H, Kendy Davis MD, 10 mL at 09/29/20 7899   sodium chloride (NS) flush 5-40 mL, 5-40 mL, IntraVENous, PRN, Kendy Davis MD 
  acetaminophen (TYLENOL) tablet 650 mg, 650 mg, Oral, Q4H PRN, Kendy Davis MD, 650 mg at 09/27/20 6597   morphine injection 2 mg, 2 mg, IntraVENous, Q4H PRN, Kendy Davis MD, 2 mg at 09/29/20 1521 
  nitroglycerin (NITROSTAT) tablet 0.4 mg, 0.4 mg, SubLINGual, Q5MIN PRN, Kendy Davis MD 
  aspirin chewable tablet 81 mg, 81 mg, Oral, DAILY, Kendy Davis MD, 81 mg at 09/29/20 0841 
  HYDROcodone-acetaminophen (NORCO) 5-325 mg per tablet 1 Tab, 1 Tab, Oral, Q4H PRN, Suzette Davis MD, 1 Tab at 09/29/20 8417   ticagrelor (BRILINTA) tablet 90 mg, 90 mg, Oral, Q12H, Suzette Davis MD, 90 mg at 09/29/20 0546 
  heparin (porcine) 10,000 unit/mL injection 1,000-10,000 Units, 1,000-10,000 Units, IntraVENous, Multiple, Suzette Davis MD, 4,000 Units at 09/22/20 1134 
  ondansetron [facility] injection 4 mg, 4 mg, IntraVENous, Q4H PRN, Suzette Davis MD, 4 mg at 09/23/20 0745 Assessment:  
 
Principal Problem: 
  Acute cystitis without hematuria (9/22/2020) Active Problems: 
  Acute ST elevation myocardial infarction (STEMI) involving left anterior descending (LAD) coronary artery ([facility] [address] (9/22/2020) Hypertension (9/22/2020) Diabetes mellitus type 2, controlled ([facility] [address] (9/22/2020) Acute renal failure (ARF) ([facility] [address] (9/23/2020) Cardiogenic shock ([facility] [address] (9/25/2020) Delirium (9/25/2020) NSVT (nonsustained ventricular tachycardia) ([facility] [address] (9/25/2020) Plan:  
 
# STEMI s/p PTCA now complicated by cardiogenic shock on Levophed gtt 
- cardiology managing - Impella discontinued 
- on Levoped gtt # Ischemic cardiomyopathy 
- management as above # MAURICIO, likely due to combination of IV contrast and cardiac ischemia 
- nephrology following 
- strict I's/O's and daily weights 
- avoid NSAIDs, IV contrast, and nephrotoxic meds 
- serial BMPs # DM type II 
- increase nighttime Lantus from 30 units to 33 units - switch from \"normal\" to \"resistant\" Humalog SSI 
- will likely have to continue to titrate as patient on tube feeds 
- goal CBGs between 140-180 while inpatient Ppx: heparin gtt for VTE Thank you for this consult. Hospitalist will continue to follow along. Please page/call with questions or concerns. Signed By: Kanu Montoya,  September 29, 2020

## 2020-09-29 NOTE — PROGRESS NOTES
9/29/2020 11:06 AM 
 
Admit Date: 9/22/2020 Admit Diagnosis: STEMI (ST elevation myocardial infarction) ([facility] [address] [I21.3] Subjective: More alert- no cp or sob Objective:  
  
Visit Vitals BP (!) 95/52 Pulse 87 Temp 97.5 °F (36.4 °C) Resp 22 Ht 5' 6\" (1.676 m) Wt 179 lb 14.3 oz (81.6 kg) Comment: bed scale not functioning/last filed value SpO2 99% BMI 29.04 kg/m² Physical Exam: 
Rogelio [place], Well Nourished, No Acute Distress, Alert & Oriented x 3, appropriate mood. Neck- supple, no JVD 
CV- regular rate and rhythm no MRG Lung- clear bilaterally Abd- soft, nontender, nondistended Ext- no edema bilaterally. Skin- warm and dry Data Review:  
Recent Labs  
  09/29/20 
0400   
K 4.6 BUN 46* CREA 3.57* WBC 14.4* HGB 8.4* HCT 24.6*  
* Assessment/Plan:  
 
Principal Problem: 
  Acute cystitis without hematuria (9/22/2020) Active Problems: 
  Acute ST elevation myocardial infarction (STEMI) involving left anterior descending (LAD) coronary artery (HCC) (9/22/2020)Stable. Continue current medical therapy. Hypertension (9/22/2020) Diabetes mellitus type 2, controlled ([address] (9/22/2020) Acute renal failure (ARF) ([address] (9/23/2020) Cardiogenic shock ([address] (9/25/2020)worse- impella removed and now hypotensive- starting levophed - wean as tolerated Delirium (9/25/2020) NSVT (nonsustained ventricular tachycardia) ([address] (9/25/2020)

## 2020-09-29 NOTE — PROGRESS NOTES
A follow up visit was made to the patient. Emotional support, spiritual presence and  
prayer were provided for the patient. He had family members with him who did understand some [place]. They assisted in communication to the patient. ELKIN Castillo

## 2020-09-29 NOTE — PROGRESS NOTES
Numerous family members asking if pt can have water. Explained to each one that pt can not have water due to possible aspiration.

## 2020-09-29 NOTE — PROGRESS NOTES
Hospital  rounded over-the-phone with Nurse Please consult the IRIS on-call calendar to find  on duty's contact information Thank you, Zechariah Fabian CERTIFIED HEALTHCARE INTERPRETERTM Language Services Department 
[address] 
[phone] (phone)

## 2020-09-29 NOTE — PROGRESS NOTES
Bedside and Verbal shift change report given to [facility] (oncoming nurse) by Karthik Edwards RN (offgoing nurse). Report included the following information SBAR, Kardex, ED Summary, Procedure Summary, Intake/Output, MAR, Recent Results, Med Rec Status, Cardiac Rhythm SR 80's and Alarm Parameters .

## 2020-09-29 NOTE — PROGRESS NOTES
Pt's Family wanted to discuss patient code status with primary physician. Cardiology made aware and discussed code status with Pt's family.

## 2020-09-29 NOTE — PROGRESS NOTES
Interdisciplinary team rounds were held 9/29/2020 with the following team members:Care Management, Nursing, Nurse Practitioner, Nutrition, Occupational Therapy, Pastoral Care, Pharmacy, Physical Therapy, Physician, Respiratory Therapy and Clinical Coordinator and the patient and child(gina). Plan of care discussed. See clinical pathway and/or care plan for interventions and desired outcomes.

## 2020-09-29 NOTE — PROGRESS NOTES
The Patient's nurse requested that the  come and visit the patient and his family members.  had visited the patient and some family members earlier today. When the  made this visit there were different family members present and they were requesting an additional prayer from the [place]. The  met the patient's wife, their son, and a nephew. ELKIN Cramer

## 2020-09-29 NOTE — PROGRESS NOTES
Head to toe assessment completed on pt this PM: 
 
Neuro: Pt is confused - disoriented x 4. PERRLA 3 mm and brisk to react. Able to follow simple commands. Able to DOTSON spontaneously. Resp: 2 L NC. Periods of apnea. Diminished breath sounds throughout. Cardiac: NSR with adequate BP. Levo gtt infusing. GI: NGT patent and infusing TF at goal. Active bowel sounds. Flexiseal patent and draining. : Wise - anuria. Plan to remove with bath. Skin: R groin site - CDI. L groin sheath - CDI.

## 2020-09-29 NOTE — PROGRESS NOTES
Received [employer] message that patient has been hypotensive since coming off of CRRT at 2230. Given history of NSVT and HRs currently in the 80-90s, will attempt low dose IV levophed with goal of MAP > 65.   
 
 
Tatyana Caraballo NP 
1:20 AM

## 2020-09-29 NOTE — PROGRESS NOTES
Pt's daughters upset that their brothers cannot see pt. They are afraid he will die and they have not seen him. Explained very clearly that for only today that they can have 2 at a time in the room and visit him today. They understand. They also asked for the valdemar. Christian paged and asked to come see pt.

## 2020-09-29 NOTE — PROGRESS NOTES
Bedside, Verbal and Written shift change report given to Allison (oncoming nurse) by Gisela Mullen (offgoing nurse). Report included the following information SBAR, Kardex, OR Summary, Intake/Output, MAR, Recent Results, Cardiac Rhythm NSR and Alarm Parameters .

## 2020-09-29 NOTE — PROGRESS NOTES
[facility] PROGRESS NOTE Follow up for: MAURICIO 
 
 
ROS:  UTO Objective:  
Exam: 
Vitals:  
 09/29/20 0557 09/29/20 0600 09/29/20 0615 09/29/20 0630 BP:  (!) 90/58 (!) 97/59 (!) 89/51 Pulse:  86 Resp:  12 Temp:      
SpO2:  99% Weight: 81.6 kg (179 lb 14.3 oz) Height:      
 
 
 
Intake/Output Summary (Last 24 hours) at 9/29/2020 4716 Last data filed at 9/29/2020 3282 Gross per 24 hour Intake 2476.94 ml Output 2092 ml Net 384.94 ml Current Facility-Administered Medications Medication Dose Route Frequency  NOREPINephrine (LEVOPHED) 4 mg in 5% dextrose 250 mL infusion  0.5-16 mcg/min IntraVENous TITRATE  insulin glargine (LANTUS) injection 33 Units  33 Units SubCUTAneous DAILY  dexmedeTOMidine (PRECEDEX) 400 mcg in 0.9% sodium chloride 100 mL infusion  0.2-0.7 mcg/kg/hr IntraVENous TITRATE  senna-docusate (PERICOLACE) 8.6-50 mg per tablet 1 Tab  1 Tab Per NG tube DAILY  lip protectant (BLISTEX) ointment 1 Each  1 Each Topical PRN  
 LORazepam (ATIVAN) injection 1 mg  1 mg IntraVENous Q2H PRN  
 atorvastatin (LIPITOR) tablet 80 mg  80 mg Oral DAILY  labetaloL (NORMODYNE;TRANDATE) injection 20 mg  20 mg IntraVENous Q6H PRN  
 sodium chloride (NS) flush 5-40 mL  5-40 mL IntraVENous Q8H  
 sodium chloride (NS) flush 5-40 mL  5-40 mL IntraVENous PRN  
 acetaminophen (TYLENOL) tablet 650 mg  650 mg Oral Q4H PRN  
 morphine injection 2 mg  2 mg IntraVENous Q4H PRN  
 nitroglycerin (NITROSTAT) tablet 0.4 mg  0.4 mg SubLINGual Q5MIN PRN  
 aspirin chewable tablet 81 mg  81 mg Oral DAILY  HYDROcodone-acetaminophen (NORCO) 5-325 mg per tablet 1 Tab  1 Tab Oral Q4H PRN  
 ticagrelor (BRILINTA) tablet 90 mg  90 mg Oral Q12H  
 heparin (porcine) 10,000 unit/mL injection 1,000-10,000 Units  1,000-10,000 Units IntraVENous Multiple  ondansetron (ZOFRAN) injection 4 mg  4 mg IntraVENous Q4H PRN  
  insulin lispro (HUMALOG) injection   SubCUTAneous AC&HS  
 
 
EXAM 
GEN - just moaning CV - S1, S2, RRR, no rub, murmur, or gallop Lung - CTA Abd - soft, nontender, BS present Ext - no edema Recent Labs  
  09/29/20 0400 09/28/20 0405 09/27/20 
5541 WBC 14.4* 15.7* 16.8* HGB 8.4* 9.3* 11.0*  
HCT 24.6* 27.6* 32.3*  
* 145* 170 Recent Labs  
  09/29/20 
0400 09/28/20 0405 09/27/20 
0419  137 138  
K 4.6 4.5 4.6  100 100 CO2 30 26 28 BUN 46* 104* 46* CREA 3.57* 7.63* 4.00* CA 8.5 8.4 9.0  
* 308* 255* Assessment and Plan:  
1) MAURICIO-  Admission Cr 1.4. MAURICIO likely ATN from ischemic injury and contrast.  Remains oliguric. 
- SED tomorrow 2) Hyperkalemia - resolved 3) Lactic Acidosis- resolved 4) STEMI- s/p LHC. Impella removed today 5) Pulmonary edema- better 6) Hypotension - Levophed Meera Mills MD

## 2020-09-29 NOTE — PROGRESS NOTES
Problem: Falls - Risk of 
Goal: *Absence of Falls Description: Document Ford Florencioangelic Fall Risk and appropriate interventions in the flowsheet. Outcome: Progressing Towards Goal 
Note: Fall Risk Interventions: 
  
 
Mentation Interventions: Adequate sleep, hydration, pain control, Bed/chair exit alarm, Door open when patient unattended, Evaluate medications/consider consulting pharmacy, Eyeglasses and hearing aids, Familiar objects from home, Family/sitter at bedside, Gait belt with transfers/ambulation, HELP ([address]) if available, Increase mobility, More frequent rounding, Reorient patient, Room close to nurse's station, Toileting rounds, Update white board Medication Interventions: Assess postural VS orthostatic hypotension, Bed/chair exit alarm, Evaluate medications/consider consulting pharmacy, Patient to call before getting OOB, Teach patient to arise slowly, Utilize gait belt for transfers/ambulation Elimination Interventions: Bed/chair exit alarm, Call light in reach, Elevated toilet seat, Patient to call for help with toileting needs, Stay With Me (per policy), Toilet paper/wipes in reach, Toileting schedule/hourly rounds Problem: Patient Education: Go to Patient Education Activity Goal: Patient/Family Education Outcome: Progressing Towards Goal 
  
Problem: Pressure Injury - Risk of 
Goal: *Prevention of pressure injury Description: Document Christiano Scale and appropriate interventions in the flowsheet. Outcome: Progressing Towards Goal 
Note: Pressure Injury Interventions: 
Sensory Interventions: Assess changes in LOC, Assess need for specialty bed, Keep linens dry and wrinkle-free, Maintain/enhance activity level, Minimize linen layers, Monitor skin under medical devices, Turn and reposition approx. every two hours (pillows and wedges if needed) Moisture Interventions: Absorbent underpads, Apply protective barrier, creams and emollients, Assess need for specialty bed, Check for incontinence Q2 hours and as needed, Internal/External urinary devices, Maintain skin hydration (lotion/cream), Minimize layers Activity Interventions: Assess need for specialty bed, Pressure redistribution bed/mattress(bed type) Mobility Interventions: Assess need for specialty bed, Float heels, HOB 30 degrees or less, Pressure redistribution bed/mattress (bed type), Turn and reposition approx. every two hours(pillow and wedges) Nutrition Interventions: Document food/fluid/supplement intake, Discuss nutritional consult with provider Friction and Shear Interventions: Apply protective barrier, creams and emollients, Foam dressings/transparent film/skin sealants, HOB 30 degrees or less, Minimize layers Problem: Patient Education: Go to Patient Education Activity Goal: Patient/Family Education Outcome: Progressing Towards Goal 
  
Problem: Diabetes Self-Management Goal: *Disease process and treatment process Description: Define diabetes and identify own type of diabetes; list 3 options for treating diabetes. Outcome: Progressing Towards Goal 
Goal: *Incorporating nutritional management into lifestyle Description: Describe effect of type, amount and timing of food on blood glucose; list 3 methods for planning meals. Outcome: Progressing Towards Goal 
Goal: *Incorporating physical activity into lifestyle Description: State effect of exercise on blood glucose levels. Outcome: Progressing Towards Goal 
Goal: *Developing strategies to promote health/change behavior Description: Define the ABC's of diabetes; identify appropriate screenings, schedule and personal plan for screenings. Outcome: Progressing Towards Goal 
Goal: *Using medications safely Description: State effect of diabetes medications on diabetes; name diabetes medication taking, action and side effects.  
Outcome: Progressing Towards Goal 
 Goal: *Monitoring blood glucose, interpreting and using results Description: Identify recommended blood glucose targets  and personal targets. Outcome: Progressing Towards Goal 
Goal: *Prevention, detection, treatment of acute complications Description: List symptoms of hyper- and hypoglycemia; describe how to treat low blood sugar and actions for lowering  high blood glucose level. Outcome: Progressing Towards Goal 
Goal: *Prevention, detection and treatment of chronic complications Description: Define the natural course of diabetes and describe the relationship of blood glucose levels to long term complications of diabetes. Outcome: Progressing Towards Goal 
Goal: *Developing strategies to address psychosocial issues Description: Describe feelings about living with diabetes; identify support needed and support network Outcome: Progressing Towards Goal 
Goal: *Insulin pump training Outcome: Progressing Towards Goal 
Goal: *Sick day guidelines Outcome: Progressing Towards Goal 
Goal: *Patient Specific Goal (EDIT GOAL, INSERT TEXT) Outcome: Progressing Towards Goal 
  
Problem: Patient Education: Go to Patient Education Activity Goal: Patient/Family Education Outcome: Progressing Towards Goal 
  
Problem: Delirium Treatment Goal: *Level of consciousness restored to baseline Outcome: Progressing Towards Goal 
Goal: *Level of environmental perceptions restored to baseline Outcome: Progressing Towards Goal 
Goal: *Sensory perception restored to baseline Outcome: Progressing Towards Goal 
Goal: *Emotional stability restored to baseline Outcome: Progressing Towards Goal 
Goal: *Functional assessment restored to baseline Outcome: Progressing Towards Goal 
Goal: *Absence of falls Outcome: Progressing Towards Goal 
Goal: *Will remain free of delirium, CAM Score negative Outcome: Progressing Towards Goal 
Goal: *Cognitive status will be restored to baseline Outcome: Progressing Towards Goal 
 Goal: Interventions Outcome: Progressing Towards Goal 
  
Problem: Patient Education: Go to Patient Education Activity Goal: Patient/Family Education Outcome: Progressing Towards Goal 
  
Problem: Nutrition Deficit Goal: *Optimize nutritional status Outcome: Progressing Towards Goal

## 2020-09-30 NOTE — PROGRESS NOTES
[address] staff  available over the phone from 3:30 p.m. - midnight. Please call Kayla at [phone] with any interpreting requests. Saintair Guerrero [facility] INTERPRETERTM Language Services Department 
[address]  [facility] 
[phone] (phone)

## 2020-09-30 NOTE — PROGRESS NOTES
Clarified visitation rules with family with assistance from pastoral care and clinical coordinator. Pt is able to have one visitor per day with a [address] speaking relative when Pt's wife visits. Pt may have [address] speaking relative present during night hours.

## 2020-09-30 NOTE — PROGRESS NOTES
Comprehensive Nutrition Assessment Type and Reason for Visit: Reassess, TF Management for Cardiology/Hospitalists. Duke Salazar Nutrition Recommendations/Plan: Continue same TF, water flush and ProSource - Nepro @ 45, water @ 10 and 4 pouches ProSource twice daily with 50 ml water before and after protein - 2264 calories/day (100% calorie goal), 175 gm protein/day (100% protein goal) in 1223 ml water/day. Nutrition Assessment:  Pt admitted s/p STEMI. S/P LHC, PCI, impella placement (9/22). HD was started on 9/24. PMH notable for HTN and DM. Estimated Daily Nutrient Needs: 
Energy (kcal):  0721-6908 (30-35 kirt/kgIBW/day using 65 kg) Protein (g):  >163 gm/d (>2.5 gm/kg IBW 65 kg/d)(adjusted for SLED (9/28)) Fluid (ml/day):  minimal per renal guidelines Nutrition Related Findings:  9/30: Abdomen: semi-soft, active bowel sounds. Last bowel movement: 9/29/2020 diarrhea. ST evaluation 9/30: continue NPO. Labs: AM glucose 254, , creatinine 6.98; POC glucose (9/29) 543,299,802,109 and (9/30) 332,265. NPH 18 units twice daily started today. Current Nutrition Therapies: DIET TUBE FEEDING 4 packets prosource at 0900, 4 packets prosource at 2100. 50 ml water flush before and after protein. Keep HOB elevated at least 30 degrees. Check gastric residual every 4 hours and hold TF for residual > than or = to 250 ml x 2 ... Current Tube Feeding (TF) Orders: · Feeding Route: Nasogastric · Formula: Nepro · Schedule:Continuous · Regimen: 45 ml/hr · Additives/Modulars: Protein · Water Flushes: 10 ml/hr · Current TF & Flush Orders Provides: 2264 kcal, 175 gm PRO, 1323 ml fluid/day · Goal TF & Flush Orders Provides: 2264 kcal, 175 gm PRO, 1223 ml fluid/day Anthropometric Measures: 
· Height:  5' 6\" (167.6 cm) · Current Body Wt:  83 kg (182 lb 15.7 oz)(ICU bed 9/30/2020) · Ideal Body Wt:  65 kg/142 lbs:     
· BMI:  29.5 · BMI Category:  overweight Nutrition Diagnosis: · Inadequate oral intake related to cognitive or neurological impairment as evidenced by nutrition support-enteral nutrition Nutrition Interventions:  
Food and/or Nutrient Delivery: Continue tube feeding Coordination of Nutrition Care: Continued inpatient monitoring, Interdisciplinary rounds, discussed with Hansa Foster RN. Goals: 
Meet nutrient needs via TF and/or PO diet within 7 days. Nutrition Monitoring and Evaluation:  
Food/Nutrient Intake Outcomes: Diet advancement/tolerance when allowed oral intake based on ST evaluation, Enteral nutrition intake/tolerance Physical Signs/Symptoms Outcomes: Biochemical data, Chewing or swallowing, GI status, Hemodynamic status Discharge Planning: Too soon to determine Electronically signed by Marisol Mcconnell RD, LD, [address] [place]  on 9/30/2020 at 3:26 PM 
Contact: [phone]

## 2020-09-30 NOTE — PROGRESS NOTES
[facility] staff  available over the phone from 3:30 p.m. - midnight. Please call Kayla at [phone] with any interpreting requests. Skyler Horne CERTIFIED HEALTHCARE INTERPRETERTM Language Services Department 
[place] [address] [place] [address] 
[phone] (phone)

## 2020-09-30 NOTE — MED STUDENT NOTES
History and Physical 
 
Patient: Brayan Mitchell MRN: [id number]  SSN: [id number] YOB: 1940  Age: 79 y.o. Sex: male Subjective:  
  
Brayan Mitchell is a 79 y.o. male who presents with mental status changes beginning 7 days ago. Pt does not speak English and requires an . He was initially brought to the ED on 9/22 after experiencing chest pain, which was a STEMI and underwent PTCA (percutaneous transluminal coronary angioplasty). He is now being monitored for DM management, MAURICIO, hyperkalemia, cardiogenic shock with resolving pulmonary edema, and now for mental status changes. His family stated that he has been \"doing well and talking\", however they report that he has been recently saying things that do not make sense. He is a war  and has been speaking of the war as present-tense. The speech therapist noticed dysphagia. The nurse reports that he was last normal 7 days ago, and he has slowly been declining in his mentation. He was once cooperative and compliant, and is now becoming more agitated. According to his nurse, he is becoming more confused. Unable to get history directly from patient, spoke with family members both in person and over the phone. They report that he has had no changes neurologically. He has had no history of strokes or seizures. He reportedly has had no changes in sensation, nor slurred speech, or dysphasia. Nurse has noted apneic pauses. Past Medical History:  
Diagnosis Date  Diabetes ([facility] [address]  Hypertension No past surgical history on file. No family history on file. Social History Tobacco Use  Smoking status: Never Smoker  Smokeless tobacco: Never Used Substance Use Topics  Alcohol use: Not on file Prior to Admission medications Medication Sig Start Date End Date Taking? Authorizing Provider  
amLODIPine (Norvasc) 10 mg tablet Take 10 mg by mouth daily.     Other, MD Claudy  
 hydroCHLOROthiazide (HYDRODIURIL) 25 mg tablet Take 25 mg by mouth daily. Claudy Joseph MD  
glyBURIDE-metFORMIN (GLUCOVANCE) 2.5-500 mg per tablet Take 2 Tabs by mouth two (2) times daily (with meals). Claudy Joseph MD  
  
 
No Known Allergies Objective:  
 
Vitals:  
 09/30/20 1300 09/30/20 1314 09/30/20 1317 09/30/20 1329 BP: (!) 107/55 (!) 97/59 (!) 97/59 107/61 Pulse: 100 99 100 98 Resp: 19 27 12 Temp:      
SpO2: 96% 94%  99% Weight:      
Height:      
  
 
Physical Exam: 
GENERAL: fatigued, non-cooperative, mild distress, appears stated age NEURO (exam performed by Dr. Marline Garza): diminished movement on the left side of the body, no Babinski reflex noted bilaterally Assessment:  
 
Hospital Problems  Never Reviewed Codes Class Noted POA Cardiogenic shock ([facility] ICD-10-CM: R57.0 ICD-9-CM: 785.51  9/25/2020 Unknown Delirium ICD-10-CM: R41.0 ICD-9-CM: 780.09  9/25/2020 Unknown NSVT (nonsustained ventricular tachycardia) ([facility]) ICD-10-CM: I47.2 ICD-9-CM: 427.1  9/25/2020 Unknown Acute renal failure (ARF) ([facility]) ICD-10-CM: N17.9 ICD-9-CM: 584.9  9/23/2020 No  
   
 Acute ST elevation myocardial infarction (STEMI) involving left anterior descending (LAD) coronary artery ([facility]) ICD-10-CM: I21.02 
ICD-9-CM: 410.10  9/22/2020 Yes Hypertension ICD-10-CM: I10 
ICD-9-CM: 401.9  9/22/2020 Yes Diabetes mellitus type 2, controlled ([facility] ICD-10-CM: E11.9 ICD-9-CM: 250.00  9/22/2020 Yes * (Principal) Acute cystitis without hematuria ICD-10-CM: N30.00 ICD-9-CM: 595.0  9/22/2020 Yes Plan:  
 
Differential diagnosis 1. CVA  
- obtain CT and MRI 
- according to his most recent Echo, he has a mass on his apical wall and akinesis of his entire anterior mid anteroseptal and apical walls which makes him more prone to having a stroke 2. MAURICIO-induced delirium - MAURICIO is a risk factor for postoperative delirium after cardiac surgery Signed By: Augusto Javed September 30, 2020 *ATTENTION:  This note has been created by a medical student for educational purposes only. Please do not refer to the content of this note for clinical decision-making, billing, or other purposes. Please see attending physicians note to obtain clinical information on this patient. *

## 2020-09-30 NOTE — PROGRESS NOTES
Dialysis machine pump not working. Pt given all blood back. Dialysis nurse notified to fix dialysis machine.

## 2020-09-30 NOTE — PROGRESS NOTES
12 HR SLED initiated using  CVC, right IJ. Aspirated and flushed both catheter ports without problem. Machine settings per MD order. 150  DFR  250ml/hr UFR No heparin this treatment. Reported to primary nurse. Dialysis nurse available as needed. 09/30/20 1030 Patient Information Informed Consent Verified Yes Dialyzer/Set Up Inspection Dialyzer/Set Up Inspection F-6 Alarms Verified Yes Test Pass Yes  
pH 7.2 Machine Conductivity 14.1 Meter Conductivity 14.1 Reverse Osmosis Safety Checks Reverse Osmosis Machine Log Completed Yes Total Chlorine Test Negative Machine Initiation Machine Number K2 Procedure Start Time 6349 Unused Lines Clamped Yes Machine Temperature 95.4 °F (35.2 °C) Dialysis Initiation All Connections Secured Yes NS Bag  Yes Saline Line Double Clamped Yes Prime Given Air Foam Detector Engaged Yes Dialysate NA (mEq/L) 140 Dialysate K (mEq/L) 4 Dialysate CA (mEq/L) 2.5 Dialysate HCO3 (mEq/L) 35 Citrasate No  
During Dialysis Pulse (Heart Rate) 96 /61 Transducer Checks Dry Saline Given (mL) 300 mL Heparin Bolus (units) 0 units Continuous Heparin Infusion (Units/hr) 0 Units/hr Blood Flow Rate (ml/min) 150 ml/min Dialysate Flow Rate (ml/hr) 300 ml/hr Arterial Access Pressure (mmHg) 90 Venous Return Pressure (mmHg) 40 Transmembrane Pressure (mmHg) 80 mmHg Hourly Chamber Checks Yes Ultrafiltration Rate (ml/hr) 250 ml/hr Hemodialysis Access 09/24/20 Placement Date: 09/24/20   Access Location: Internal jugular, right [employer] Being Utilized Yes Criteria for Appropriate Use Dialysis/apheresis Date Accessed  09/30/20 Access Time  1030 Site Assessment Clean, dry, & intact Date of Last Dressing Change 09/28/20 Dressing Status Clean, dry, & intact Dressing Type Disk with Chlorhexadine gluconate (CHG); Transparent Proximal Hub Color/Line Status Yellow; Flushed; Infusing Distal Hub Color/Line Status Yellow; Flushed; Infusing  
$$ Dialysis Charges  
$$ Method CRRT  
CRRT Dialysis Intake/Output Patient Location [place]

## 2020-09-30 NOTE — PROGRESS NOTES
This tech attempted EEG per Dr. Johnathan albrecht, unable to complete EEG due to dialysis in progress. Will re-attempt Thursday morning.  DIANA Flowers.T

## 2020-09-30 NOTE — CONSULTS
Consult Patient: Ines Salcido MRN: [id number]  SSN: [id number] YOB: 1940  Age: 79 y.o. Sex: male Assessment: 1. Diffuse encephalopathy 2. Doesn't seem to move LUE much - suspect stroke - likely embolic 3. Leukocytosis with history of UTI on admission Hospital Problems  Never Reviewed Codes Class Noted POA Cardiogenic shock ([facility] ICD-10-CM: R57.0 ICD-9-CM: 785.51  9/25/2020 Unknown Delirium ICD-10-CM: R41.0 ICD-9-CM: 780.09  9/25/2020 Unknown NSVT (nonsustained ventricular tachycardia) ([facility]) ICD-10-CM: I47.2 ICD-9-CM: 427.1  9/25/2020 Unknown Acute renal failure (ARF) ([facility]) ICD-10-CM: N17.9 ICD-9-CM: 584.9  9/23/2020 No  
   
 Acute ST elevation myocardial infarction (STEMI) involving left anterior descending (LAD) coronary artery ([facility]) ICD-10-CM: I21.02 
ICD-9-CM: 410.10  9/22/2020 Yes Hypertension ICD-10-CM: I10 
ICD-9-CM: 401.9  9/22/2020 Yes Diabetes mellitus type 2, controlled ([facility] ICD-10-CM: E11.9 ICD-9-CM: 250.00  9/22/2020 Yes * (Principal) Acute cystitis without hematuria ICD-10-CM: N30.00 ICD-9-CM: 595.0  9/22/2020 Yes Plan:  
 
CT head. Non contrast. IF negative may need MRI  
 
EEG Subjective:  
  
Ines Salcido is a 79 y.o. male who is being seen for evaluaiton of [facility]. Patient was originally admitted on September 22 with chest pain. Pollo Hathaway He underwent left heart catheterization which showed obstructive coronary artery disease which was stented he was in cardiogenic shock and required an Impella device. He was seen by Dr. Yves Tripathi on the 22nd with assessment of acute cystitis STEMI hypertension and type 2 diabetes. Blood cultures were drawn and the patient was started on Rocephin. Patient developed gross hematuria and acute kidney injury with increasing creatinine from 1.4-2.7 and was seen by nephrology on 9/23. ON 9/25 and echocardiogram demonstrated EF 10-20% with widespread hypokinesis and mural thrombus. according to the patient's nurse neurological changes began on 9/26 and neurology was consulted. Dr. Marcela Correa saw the patient and felt the exam was non focal and was consistent with metabolic encephalopathy. Since then the patient has continued require sedation with Precedex for safety. He is noted to have reduced spontaneious movement of the LUE. Past Medical History:  
Diagnosis Date  Diabetes ([facility]  Hypertension No past surgical history on file. No family history on file. Social History Tobacco Use  Smoking status: Never Smoker  Smokeless tobacco: Never Used Substance Use Topics  Alcohol use: Not on file Current Facility-Administered Medications Medication Dose Route Frequency Provider Last Rate Last Dose  insulin regular (NOVOLIN R, HUMULIN R) injection   SubCUTAneous Q4H Ciesco, Brayan, DO   6 Units at 09/30/20 1315  insulin NPH (NOVOLIN N, HUMULIN N) injection 18 Units  18 Units SubCUTAneous Q12H Ciesco, Jen Lemus, DO   18 Units at 09/30/20 4493  heparin (porcine) 1,000 unit/mL injection 5,000 Units  5,000 Units Hemodialysis DIALYSIS PRN Mini Reyes MD      
 NOREPINephrine (LEVOPHED) 4 mg in 5% dextrose 250 mL infusion  0.5-16 mcg/min IntraVENous TITRATE José Manuel Moreno MD 15 mL/hr at 09/30/20 0205 4 mcg/min at 09/30/20 0205  dexmedeTOMidine (PRECEDEX) 400 mcg in 0.9% sodium chloride 100 mL infusion  0.2-0.7 mcg/kg/hr IntraVENous TITRATE Caitlin Diaz NP 10.2 mL/hr at 09/30/20 0805 0.5 mcg/kg/hr at 09/30/20 2919  senna-docusate (PERICOLACE) 8.6-50 mg per tablet 1 Tab  1 Tab Per NG tube DAILY José Manuel Moreno MD   1 Tab at 09/30/20 0706  lip protectant (BLISTEX) ointment 1 Each  1 Each Topical PRN Wyatt Vega MD      
 LORazepam (ATIVAN) injection 1 mg  1 mg IntraVENous Q2H PRN Max Wilhelm MD   1 mg at 09/26/20 0430  
 atorvastatin (LIPITOR) tablet 80 mg  80 mg Oral DAILY Max Wilhelm MD   80 mg at 09/30/20 6967  labetaloL (NORMODYNE;TRANDATE) injection 20 mg  20 mg IntraVENous Q6H PRN Zhanna Myles MD      
 sodium chloride (NS) flush 5-40 mL  5-40 mL IntraVENous Q8H Kendy Davis MD   10 mL at 09/30/20 0600  
 sodium chloride (NS) flush 5-40 mL  5-40 mL IntraVENous PRN Kendy Davis MD      
 acetaminophen (TYLENOL) tablet 650 mg  650 mg Oral Q4H PRN Kendy Davis MD   650 mg at 09/27/20 6257  morphine injection 2 mg  2 mg IntraVENous Q4H PRN Kendy Davis MD   2 mg at 09/30/20 0009  nitroglycerin (NITROSTAT) tablet 0.4 mg  0.4 mg SubLINGual Q5MIN PRN Kendy Davis MD      
 aspirin chewable tablet 81 mg  81 mg Oral DAILY Kendy Davis MD   81 mg at 09/30/20 0951  
 HYDROcodone-acetaminophen (NORCO) 5-325 mg per tablet 1 Tab  1 Tab Oral Q4H PRN Kendy Davis MD   1 Tab at 09/30/20 1338  ticagrelor (BRILINTA) tablet 90 mg  90 mg Oral Q12H Kendy Davis MD   90 mg at 09/30/20 0529  
 ondansetron [facility]) injection 4 mg  4 mg IntraVENous Q4H PRN Kendy Davis MD   4 mg at 09/23/20 0745 No Known Allergies Review of Systems: 
 
The patient is unable to provide ROS due to his medical condition. Objective:  
 
Vitals:  
 09/30/20 1300 09/30/20 1314 09/30/20 1317 09/30/20 1329 BP: (!) 107/55 (!) 97/59 (!) 97/59 107/61 Pulse: 100 99 100 98 Resp: 19 27  12 Temp:      
SpO2: 96% 94%  99% Weight:      
Height:      
  
 
Physical Exam: General patient is noted to be somnolent. When stimulated he engages and nonpurposeful and semipurposeful movement but has more spontaneous movement of the right upper extremity than the right lower extremity. He vigorously resists examination of the pupils turning away and closing his eyes. No tremor or myoclonus is evident. Reflexes 1-3+ and symmetric plantar responses are withdrawal bilaterally. Recent Results (from the past 24 hour(s)) GLUCOSE, POC Collection Time: 09/29/20  4:40 PM  
Result Value Ref Range Glucose (POC) 273 (H) 65 - 100 mg/dL GLUCOSE, POC Collection Time: 09/29/20 10:46 PM  
Result Value Ref Range Glucose (POC) 294 (H) 65 - 100 mg/dL LACTIC ACID Collection Time: 09/30/20  3:48 AM  
Result Value Ref Range Lactic acid 1.3 0.4 - 2.0 MMOL/L  
CBC WITH AUTOMATED DIFF Collection Time: 09/30/20  3:48 AM  
Result Value Ref Range WBC 15.0 (H) 4.3 - 11.1 K/uL  
 RBC 2.86 (L) 4.23 - 5.6 M/uL HGB 8.0 (L) 13.6 - 17.2 g/dL HCT 24.4 (L) 41.1 - 50.3 % MCV 85.3 79.6 - 97.8 FL  
 MCH 28.0 26.1 - 32.9 PG  
 MCHC 32.8 31.4 - 35.0 g/dL  
 RDW 15.3 (H) 11.9 - 14.6 % PLATELET 096 (L) 867 - 450 K/uL MPV 12.3 9.4 - 12.3 FL ABSOLUTE NRBC 0.02 0.0 - 0.2 K/uL  
 DF AUTOMATED NEUTROPHILS 69 43 - 78 % LYMPHOCYTES 13 13 - 44 % MONOCYTES 14 (H) 4.0 - 12.0 % EOSINOPHILS 2 0.5 - 7.8 % BASOPHILS 0 0.0 - 2.0 % IMMATURE GRANULOCYTES 2 0.0 - 5.0 %  
 ABS. NEUTROPHILS 10.4 (H) 1.7 - 8.2 K/UL  
 ABS. LYMPHOCYTES 1.9 0.5 - 4.6 K/UL  
 ABS. MONOCYTES 2.0 (H) 0.1 - 1.3 K/UL  
 ABS. EOSINOPHILS 0.3 0.0 - 0.8 K/UL  
 ABS. BASOPHILS 0.0 0.0 - 0.2 K/UL  
 ABS. IMM. GRANS. 0.3 0.0 - 0.5 K/UL METABOLIC PANEL, BASIC Collection Time: 09/30/20  3:48 AM  
Result Value Ref Range Sodium 135 (L) 136 - 145 mmol/L Potassium 4.8 3.5 - 5.1 mmol/L Chloride 99 98 - 107 mmol/L  
 CO2 25 21 - 32 mmol/L Anion gap 11 7 - 16 mmol/L Glucose 254 (H) 65 - 100 mg/dL  (H) 8 - 23 MG/DL Creatinine 6.98 (H) 0.8 - 1.5 MG/DL  
 GFR est AA 10 (L) >60 ml/min/1.73m2 GFR est non-AA 8 (L) >60 ml/min/1.73m2 Calcium 8.4 8.3 - 10.4 MG/DL  
GLUCOSE, POC Collection Time: 09/30/20  7:09 AM  
Result Value Ref Range Glucose (POC) 332 (H) 65 - 100 mg/dL GLUCOSE, POC Collection Time: 09/30/20 12:51 PM  
Result Value Ref Range Glucose (POC) 265 (H) 65 - 100 mg/dL Lab Results Component Value Date/Time Cholesterol, total 132 09/23/2020 05:38 AM  
 HDL Cholesterol 48 09/23/2020 05:38 AM  
 LDL, calculated 51.4 09/23/2020 05:38 AM  
 VLDL, calculated 32.6 (H) 09/23/2020 05:38 AM  
 Triglyceride 163 (H) 09/23/2020 05:38 AM  
 CHOL/HDL Ratio 2.8 09/23/2020 05:38 AM  
  
 
Lab Results Component Value Date/Time Hemoglobin A1c 8.0 (H) 09/22/2020 05:18 PM  
  
 
CT Results (most recent): No results found for this or any previous visit. Results for orders placed or performed during the hospital encounter of 09/22/20 EKG, 12 LEAD, INITIAL Result Value Ref Range Ventricular Rate 86 BPM  
 Atrial Rate 86 BPM  
 P-R Interval 144 ms QRS Duration 108 ms Q-T Interval 374 ms QTC Calculation (Bezet) 447 ms Calculated P Axis 57 degrees Calculated R Axis -79 degrees Calculated T Axis 88 degrees Diagnosis Normal sinus rhythm Left anterior fascicular block Anterior infarct (cited on or before 23-SEP-2020) Inferolateral injury pattern 
** ** ACUTE MI / STEMI ** ** Abnormal ECG When compared with ECG of 23-SEP-2020 06:13, Left anterior fascicular block is now Present 
persistent anterior apical injury current since 9/22/20 Confirmed by MIGUEL IBARRA (), [place] ([id number]) on 9/25/2020 10:55:53 AM 
  
  
 
MRI Results (most recent): No results found for this or any previous visit. US Results (most recent): No results found for this or any previous visit. Most recent CTA No results found for this or any previous visit. Most recent MRI No results found for this or any previous visit. Signed By: Italo Tobias MD   
 September 30, 2020

## 2020-09-30 NOTE — PROGRESS NOTES
9/30/2020 3:33 PM 
 
Admit Date: 9/22/2020 Admit Diagnosis: STEMI (ST elevation myocardial infarction) ([facility] [I21.3] Subjective:  
Still confused but more alert at times- currently on HD Objective:  
  
Visit Vitals BP (!) 89/54 Pulse 100 Temp 97.1 °F (36.2 °C) Resp 12 Ht 5' 6\" (1.676 m) Wt 182 lb 15.7 oz (83 kg) SpO2 99% BMI 29.53 kg/m² Physical Exam: 
[place] [employer], Well Nourished, No Acute Distress, Alert & Oriented x 3, appropriate mood. Neck- supple, no JVD 
CV- regular rate and rhythm no MRG Lung- clear bilaterally Abd- soft, nontender, nondistended Ext- no edema bilaterally. Skin- warm and dry Data Review:  
Recent Labs  
  09/30/20 
7339 * K 4.8 * CREA 6.98* WBC 15.0* HGB 8.0*  
HCT 24.4*  
* Assessment/Plan:  
 
Principal Problem: 
  Acute cystitis without hematuria (9/22/2020) Active Problems: 
  Acute ST elevation myocardial infarction (STEMI) involving left anterior descending (LAD) coronary artery (HCC) (9/22/2020)Stable. Continue current medical therapy. Hypertension (9/22/2020) Diabetes mellitus type 2, controlled ([address] (9/22/2020) Acute renal failure (ARF) ([address] (9/23/2020) Cardiogenic shock ([address] (9/25/2020)better- on less levo- try to wean off Delirium (9/25/2020) NSVT (nonsustained ventricular tachycardia) ([address] (9/25/2020)Improved with current therapy. Will continue medications

## 2020-09-30 NOTE — PROGRESS NOTES
Problem: Dysphagia (Adult) Goal: *Acute Goals and Plan of Care (Insert Text) Description: LTG: Patient will tolerate least restrictive diet without overt signs or symptoms of airway compromise. STG: Patient will participate in ongoing po trials with speech therapist only to determine safest oral diet. STG: Patient will participate in modified barium swallow study as clinically indicated. Outcome: Progressing Towards Goal 
  
 
SPEECH LANGUAGE PATHOLOGY: DYSPHAGIA- Initial Assessment NAME/AGE/GENDER: Kalen Borden is a 79 y.o. male DATE: 9/30/2020 PRIMARY DIAGNOSIS: STEMI (ST elevation myocardial infarction) ([facility] [I21.3] ICD-10: Treatment Diagnosis: R13.12 Dysphagia, Oropharyngeal Phase RECOMMENDATIONS  
DIET:  
NPO with alternative means of nutrition MEDICATIONS: Non-oral 
  
ASPIRATION PRECAUTIONS Aggressive oral care 3-4x/daily COMPENSATORY STRATEGIES/MODIFICATIONS None EDUCATION: 
Recommendations discussed with Nursing Family Patient CONTINUATION OF SKILLED SERVICES/MEDICAL NECESSITY: 
Patient is expected to demonstrate progress in  swallow strength, swallow timeliness, swallow function, diet tolerance, and swallow safety in order to  improve swallow safety, work toward diet advancement, and decrease aspiration risk. Patient continues to require skilled intervention due to dysphagia. RECOMMENDATIONS for CONTINUED SPEECH THERAPY:  
YES: Anticipate need for ongoing speech therapy during this hospitalization and at next level of care. ASSESSMENT Patient is non-verbal with essentially no command following. Presents with significant oropharyngeal dysphagia symptom exacerbated by impaired mentation. No attempt to manipulate ice chip despite max cues. Required oral suctioning to remove bolus. Delayed coughing after single trial. Further trials deferred as patient is at high risk for aspiration due to impaired cognitive status Recommend strict NPO with temporary alternate means of nutrition. Medications non-oral. Will continue to follow for ongoing po trials to determine if safe to advance to oral diet. REHABILITATION POTENTIAL FOR STATED GOALS: Fair PLAN   
FREQUENCY/DURATION: Continue to follow patient 3 times a week for duration of hospital stay to address above goals. - Recommendations for next treatment session: Next treatment will address po trials SUBJECTIVE Patient awake and restless upon arrival. NGT in place. Non-verbal with essentially no command following. Family members at bedside.  assisted with communication via phone. RN reports family has been asking to give patient water. History of Present Injury/Illness: Mr. Gin Wu  has a past medical history of Diabetes ([address] and Hypertension. [place] Peaks He also  has no past surgical history on file. Problem List:  (Impairments causing functional limitations): Oropharyngeal dysphagia Previous Dysphagia: NONE REPORTED Diet Prior to Evaluation: NPO with NGT Orientation:  
Non-verbal 
 
Pain: Pain Scale 1: Adult Nonverbal Pain Scale Pain Intensity 1: 0 Cognitive-Linguistic Screen: 
Prior level of function: Patient lives at home with wife and daughter. Does not drive. Speech Production:  
Non-verbal 
Expressive Language: 
Impaired- non-verbal 
Receptive Language: 
Essentially no command following Cognition:  
Impaired- unable to furhter assess as patient is non-verbal and not following commands or responding to yes/no questions Recommendations: Given results of speech, language, cognitive screening,  patient may require cognitive linguistic assessment at later date if mental status does not improve; however, not clinically indicated at this time. OBJECTIVE Oral Motor: COMMENTS: Limited oral mech exam as patient is not following command. Has limited natural dentition. No facial asymmetry noted at rest 
 
Swallow evaluation: Patient presented with single ice chip. Restless and moving around. Would not open mouth when ice chip was presented by teaspoon; however, accepted trial when therapist brushed lower lip with ice chip. No attempt to achieve labial seal or masticate ice chip despite max verbal/tactile cues. Therapist attempted to remove ice chip from oral cavity; however, patient then pursed lips together. Therapist able to utilize oral suction to remove melted liquid from oral cavity. Delayed weak cough after single presentation. INTERDISCIPLINARY COLLABORATION: Registered Nurse PRECAUTIONS/ALLERGIES: Patient has no known allergies. Tool Used: Dysphagia Outcome and Severity Scale (JAVI) Score Comments Normal Diet  [] 7 With no strategies or extra time needed Functional Swallow  [] 6 May have mild oral or pharyngeal delay Mild Dysphagia  [] 5 Which may require one diet consistency restricted Mild-Moderate Dysphagia  [] 4 With 1-2 diet consistencies restricted Moderate Dysphagia  [] 3 With 2 or more diet consistencies restricted Moderate-Severe Dysphagia  [] 2 With partial PO strategies (trials with ST only) Severe Dysphagia  [x] 1 With inability to tolerate any PO safely Score:  Initial:1 Most Recent: x (Date 09/30/20 ) Interpretation of Tool: The Dysphagia Outcome and Severity Scale (JAVI) is a simple, easy-to-use, 7-point scale developed to systematically rate the functional severity of dysphagia based on objective assessment and make recommendations for diet level, independence level, and type of nutrition. Current Medications: No current facility-administered medications on file prior to encounter. Current Outpatient Medications on File Prior to Encounter Medication Sig Dispense Refill  
 amLODIPine (Norvasc) 10 mg tablet Take 10 mg by mouth daily. hydroCHLOROthiazide (HYDRODIURIL) 25 mg tablet Take 25 mg by mouth daily. glyBURIDE-metFORMIN (GLUCOVANCE) 2.5-500 mg per tablet Take 2 Tabs by mouth two (2) times daily (with meals). After treatment position/precautions: 
Upright in bed RN notified Family at bedside Total Treatment Duration:  
Time In: 8767 Time Out: 2553 Ansley Voss MS, CCC-SLP

## 2020-09-30 NOTE — PROGRESS NOTES
Progress Note Patient: Abiel Segura MRN: [id number]  SSN: [id number] YOB: 1940  Age: 79 y.o. Sex: male Admit Date: 9/22/2020 LOS: 8 days Subjective:  
 
Abiel Segura is a 79 y.o. male who is being seen for consultation for DM management, MAURICIO and hyperkalemia.  
  
Patient has DM and hypertension. He speaks only [place] and daughter is on the phone helping with communication.  
  
Patient has been admitted on 9/22/2020 due to chest pain. He had STEMI and underwent PTCA.  
  
\" PROCEDURES PERFORMED: 1.  Left heart cath, selective coronary arteriography, and left ventriculogram via the right radial approach. 2.  PCI and stenting of the LAD. 3.  IVUS of LAD after stenting. 4.  Impella CP LVAD placement via the right common femoral artery. 5.  Placement of a continuous cardiac output [place] catheter via the left femoral vein. \"  
  
His BS is elevated. His creatinine is up after left heart catheterization and he has hyperkalemia and metabolic acidosis. Nephrology is consulted. Patient has made little urine. He has been on HD for the past 3 days with SLED. Patient tolerated it. Interval History (9/30): patient examined at bedside. Worsening confusion and encephalopathy, patient's sons say he is not moving his upper extremities as much. Rest of history still limited by language barrier and with clinical condition. Still with very minimal urine output. Objective:  
 
Vitals:  
 09/30/20 1559 09/30/20 1614 09/30/20 1629 09/30/20 1641 BP: (!) 89/51 100/60 (!) 93/56 (!) 93/56 Pulse: 97 96 99 96 Resp: 22 29 23 Temp:      
SpO2: 95% 94% 97% Weight:      
Height:      
  
 
Intake and Output: 
Current Shift: 09/30 0701 - 09/30 1900 In: 300 Out: 1110 Last three shifts: 09/28 1901 - 09/30 0700 In: 3639.7 [I.V.:1151.7] Out: 401 [Urine:5; Drains:50] Physical Exam:  
 
General:                    DIFVUGB male that is encephalopathic JUGK:                                   MCYFFUAIEZIGQ/IHCJRWIIPH. Eyes:                                   palpebral pallor, no scleral icterus. ENT:                                    External auricular and nasal exam within normal limits.                                             EHQCDV membranes are moist. 
Neck:                                   Supple, non-tender, no JVD. Right IJ catheter is in place Lungs:                       decreased to auscultation bilaterally without wheezes or crackles.                                             No respiratory distress or accessory muscle use. Heart:                                  Regular rate and rhythm, without murmurs, rubs, or gallops. Abdomen:                  Soft, non-tender, mildly distended with normoactive bowel sounds. Genitourinary:           No tenderness over the bladder or bilateral CVAs. Wise's catheter in place. Extremities:               Without clubbing, cyanosis,mild edema. Right groin with catheter in place. Skin:                                    Normal color, texture, and turgor. No rashes, lesions, or jaundice. Pulses:                      Radial and dorsalis pedis pulses present 2+ bilaterally.  
                                            Capillary refill <2s. Neurologic:                moves all four extremities, not awake or oriented Lab/Data Review: 
 
Recent Results (from the past 24 hour(s)) GLUCOSE, POC Collection Time: 09/29/20 10:46 PM  
Result Value Ref Range Glucose (POC) 294 (H) 65 - 100 mg/dL LACTIC ACID Collection Time: 09/30/20  3:48 AM  
Result Value Ref Range Lactic acid 1.3 0.4 - 2.0 MMOL/L  
CBC WITH AUTOMATED DIFF Collection Time: 09/30/20  3:48 AM  
Result Value Ref Range WBC 15.0 (H) 4.3 - 11.1 K/uL  
 RBC 2.86 (L) 4.23 - 5.6 M/uL HGB 8.0 (L) 13.6 - 17.2 g/dL HCT 24.4 (L) 41.1 - 50.3 % MCV 85.3 79.6 - 97.8 FL  
 MCH 28.0 26.1 - 32.9 PG  
 MCHC 32.8 31.4 - 35.0 g/dL RDW 15.3 (H) 11.9 - 14.6 % PLATELET 082 (L) 111 - 450 K/uL MPV 12.3 9.4 - 12.3 FL ABSOLUTE NRBC 0.02 0.0 - 0.2 K/uL  
 DF AUTOMATED NEUTROPHILS 69 43 - 78 % LYMPHOCYTES 13 13 - 44 % MONOCYTES 14 (H) 4.0 - 12.0 % EOSINOPHILS 2 0.5 - 7.8 % BASOPHILS 0 0.0 - 2.0 % IMMATURE GRANULOCYTES 2 0.0 - 5.0 %  
 ABS. NEUTROPHILS 10.4 (H) 1.7 - 8.2 K/UL  
 ABS. LYMPHOCYTES 1.9 0.5 - 4.6 K/UL  
 ABS. MONOCYTES 2.0 (H) 0.1 - 1.3 K/UL  
 ABS. EOSINOPHILS 0.3 0.0 - 0.8 K/UL  
 ABS. BASOPHILS 0.0 0.0 - 0.2 K/UL  
 ABS. IMM. GRANS. 0.3 0.0 - 0.5 K/UL METABOLIC PANEL, BASIC Collection Time: 09/30/20  3:48 AM  
Result Value Ref Range Sodium 135 (L) 136 - 145 mmol/L Potassium 4.8 3.5 - 5.1 mmol/L Chloride 99 98 - 107 mmol/L  
 CO2 25 21 - 32 mmol/L Anion gap 11 7 - 16 mmol/L Glucose 254 (H) 65 - 100 mg/dL  (H) 8 - 23 MG/DL Creatinine 6.98 (H) 0.8 - 1.5 MG/DL  
 GFR est AA 10 (L) >60 ml/min/1.73m2 GFR est non-AA 8 (L) >60 ml/min/1.73m2 Calcium 8.4 8.3 - 10.4 MG/DL  
GLUCOSE, POC Collection Time: 09/30/20  7:09 AM  
Result Value Ref Range Glucose (POC) 332 (H) 65 - 100 mg/dL GLUCOSE, POC Collection Time: 09/30/20 12:51 PM  
Result Value Ref Range Glucose (POC) 265 (H) 65 - 100 mg/dL GLUCOSE, POC Collection Time: 09/30/20  4:21 PM  
Result Value Ref Range Glucose (POC) 221 (H) 65 - 100 mg/dL XR chest  
9/23/2020 IMPRESSION: 
  
1. Improved aeration of the lungs, consistent with resolving pulmonary edema. XR chest  
9/24/2020 IMPRESSION: 
  
1. Progression of bilateral interstitial densities, likely pulmonary edema. Current Facility-Administered Medications:  
  insulin regular (NOVOLIN R, HUMULIN R) injection, , SubCUTAneous, Q4H, Brayan Zendejas DO, 6 Units at 09/30/20 1315   insulin NPH (NOVOLIN N, HUMULIN N) injection 18 Units, 18 Units, SubCUTAneous, Q12H, Brayan Zendejas DO, 18 Units at 09/30/20 7500   heparin (porcine) 1,000 unit/mL injection 5,000 Units, 5,000 Units, Hemodialysis, DIALYSIS PRN, Graciela Ugarte MD 
  NOREPINephrine (LEVOPHED) 4 mg in 5% dextrose 250 mL infusion, 0.5-16 mcg/min, IntraVENous, TITRATE, Yanna Pacheco MD, Last Rate: 18.8 mL/hr at 09/30/20 1430, 5 mcg/min at 09/30/20 1430 
  dexmedeTOMidine (PRECEDEX) 400 mcg in 0.9% sodium chloride 100 mL infusion, 0.2-0.7 mcg/kg/hr, IntraVENous, TITRATE, Roque Hillman NP, Last Rate: 10.2 mL/hr at 09/30/20 0805, 0.5 mcg/kg/hr at 09/30/20 4886   senna-docusate (PERICOLACE) 8.6-50 mg per tablet 1 Tab, 1 Tab, Per NG tube, DAILY, Yanna Pacheco MD, 1 Tab at 09/30/20 1169   lip protectant (BLISTEX) ointment 1 Each, 1 Each, Topical, PRN, Lidia Beltre MD 
  LORazepam (ATIVAN) injection 1 mg, 1 mg, IntraVENous, Q2H PRN, Libby Reyes MD, 1 mg at 09/26/20 0430 
  atorvastatin (LIPITOR) tablet 80 mg, 80 mg, Oral, DAILY, Libby Reyes MD, 80 mg at 09/30/20 1128   labetaloL (NORMODYNE;TRANDATE) injection 20 mg, 20 mg, IntraVENous, Q6H PRN, Pratibha Myles MD 
  sodium chloride (NS) flush 5-40 mL, 5-40 mL, IntraVENous, Q8H, Wagner Davis MD, 10 mL at 09/30/20 1400 
  sodium chloride (NS) flush 5-40 mL, 5-40 mL, IntraVENous, PRN, Wagner Davis MD 
  acetaminophen (TYLENOL) tablet 650 mg, 650 mg, Oral, Q4H PRN, Wagner Davis MD, 650 mg at 09/27/20 8729   morphine injection 2 mg, 2 mg, IntraVENous, Q4H PRN, Wagner Davis MD, 2 mg at 09/30/20 0009   nitroglycerin (NITROSTAT) tablet 0.4 mg, 0.4 mg, SubLINGual, Q5MIN PRN, Wagner Davis MD 
  aspirin chewable tablet 81 mg, 81 mg, Oral, DAILY, Wagner Davis MD, 81 mg at 09/30/20 0951 
  HYDROcodone-acetaminophen (NORCO) 5-325 mg per tablet 1 Tab, 1 Tab, Oral, Q4H PRN, Wagner Davis MD, 1 Tab at 09/30/20 9397   ticagrelor (BRILINTA) tablet 90 mg, 90 mg, Oral, Q12H, Wagner Davis MD, 90 mg at 09/30/20 4433   ondansetron (ZOFRAN) injection 4 mg, 4 mg, IntraVENous, Q4H PRN, Maria Luz Davis MD, 4 mg at 09/23/20 0745 Assessment:  
 
Principal Problem: 
  Acute ST elevation myocardial infarction (STEMI) involving left anterior descending (LAD) coronary artery ([facility] [address] (9/22/2020) Active Problems: Hypertension (9/22/2020) Diabetes mellitus type 2, controlled ([facility] [address] (9/22/2020) Acute renal failure (ARF) ([address] (9/23/2020) Cardiogenic shock ([facility] [address] (9/25/2020) Delirium (9/25/2020) NSVT (nonsustained ventricular tachycardia) ([facility] [address] (9/25/2020) Plan:  
 
# STEMI s/p PTCA now complicated by cardiogenic shock on Levophed gtt 
- cardiology managing - Impella discontinued 
- on Levoped gtt # Worsening confusion/upper extremity weakness 
- concern for CVA 
- agree with CT head without contrast and EEG 
- neurology following 
- if confirmed ischemic CVA, would recommend holding all heparin products to avoid hemorrhagic conversion 
- has been on Precedex gtt for confusion/agitation 
- avoid sedative/hypnotics and narcotics # Ischemic cardiomyopathy 
- management as above # Anuric MAURICIO, likely due to combination of IV contrast and cardiac ischemia 
- nephrology following 
- strict I's/O's and daily weights 
- avoid NSAIDs, IV contrast, and nephrotoxic meds 
- serial BMPs # DM type II 
- switch from Lantus to NPH 18 units SQ q12h 
- switch from Humalog to regular insulin SSI 
- serial CBGs between 140-180 
- patient on continuous tube feeds Ppx: SCDs for VTE Thank you for this consult. Hospitalist will continue to follow along. Please page/call with questions or concerns. Signed By: Bonnie Castillo DO September 30, 2020

## 2020-09-30 NOTE — PROGRESS NOTES
A follow up visit was made to the patient. Emotional support, spiritual presence and  
prayer were provided for the patient. His wife and daughter were present. His wife speaks only a little [place]. The  communicated with the patient's daughter who understands [place]. Nidhi Leon MDIV

## 2020-09-30 NOTE — DIALYSIS
Multiple machine alarms on CRRT. Blood returned, Dr. Danny Alexander notified, and orders received to add heparin during treatment. Some clotting noted in dialyzer but able to return all of the patient's blood. New bloodlines placed and treatment resumed with heparin 2000 units bolus and 500 units/hr. Primary RN updated.

## 2020-09-30 NOTE — PROGRESS NOTES
Interdisciplinary team rounds were held 9/30/2020 with the following team members:Care Management, Nursing, Nurse Practitioner, Nutrition, Pharmacy, Physical Therapy, Physician, Respiratory Therapy and Clinical Coordinator and the patient and child(gina). Plan of care discussed. See clinical pathway and/or care plan for interventions and desired outcomes.

## 2020-09-30 NOTE — PROGRESS NOTES
[facility] PROGRESS NOTE Follow up for: MAURICIO 
 
 
ROS:  UTO Objective:  
Exam: 
Vitals:  
 09/30/20 0714 09/30/20 0729 09/30/20 1006 09/30/20 1030 BP: 101/64 101/65 105/64 107/61 Pulse: 96 91 98 96 Resp: 9 (!) 31 Temp:      
SpO2: 96% 94% Weight:      
Height:      
 
 
 
Intake/Output Summary (Last 24 hours) at 9/30/2020 1043 Last data filed at 9/30/2020 1006 Gross per 24 hour Intake 2387.68 ml Output 55 ml Net 2332.68 ml Current Facility-Administered Medications Medication Dose Route Frequency  insulin regular (NOVOLIN R, HUMULIN R) injection   SubCUTAneous Q4H  
 insulin NPH (NOVOLIN N, HUMULIN N) injection 18 Units  18 Units SubCUTAneous Q12H  
 NOREPINephrine (LEVOPHED) 4 mg in 5% dextrose 250 mL infusion  0.5-16 mcg/min IntraVENous TITRATE  dexmedeTOMidine (PRECEDEX) 400 mcg in 0.9% sodium chloride 100 mL infusion  0.2-0.7 mcg/kg/hr IntraVENous TITRATE  senna-docusate (PERICOLACE) 8.6-50 mg per tablet 1 Tab  1 Tab Per NG tube DAILY  lip protectant (BLISTEX) ointment 1 Each  1 Each Topical PRN  
 LORazepam (ATIVAN) injection 1 mg  1 mg IntraVENous Q2H PRN  
 atorvastatin (LIPITOR) tablet 80 mg  80 mg Oral DAILY  labetaloL (NORMODYNE;TRANDATE) injection 20 mg  20 mg IntraVENous Q6H PRN  
 sodium chloride (NS) flush 5-40 mL  5-40 mL IntraVENous Q8H  
 sodium chloride (NS) flush 5-40 mL  5-40 mL IntraVENous PRN  
 acetaminophen (TYLENOL) tablet 650 mg  650 mg Oral Q4H PRN  
 morphine injection 2 mg  2 mg IntraVENous Q4H PRN  
 nitroglycerin (NITROSTAT) tablet 0.4 mg  0.4 mg SubLINGual Q5MIN PRN  
 aspirin chewable tablet 81 mg  81 mg Oral DAILY  HYDROcodone-acetaminophen (NORCO) 5-325 mg per tablet 1 Tab  1 Tab Oral Q4H PRN  
 ticagrelor (BRILINTA) tablet 90 mg  90 mg Oral Q12H  
 ondansetron (ZOFRAN) injection 4 mg  4 mg IntraVENous Q4H PRN  
 
 
EXAM 
GEN - just moaning CV - S1, S2, RRR, no rub, murmur, or gallop Lung - CTA Abd - soft, nontender, BS present Ext - no edema Recent Labs  
  09/30/20 
0348 09/29/20 
0400 09/28/20 
0405 WBC 15.0* 14.4* 15.7* HGB 8.0* 8.4* 9.3* HCT 24.4* 24.6* 27.6*  
* 133* 145* Recent Labs  
  09/30/20 
0348 09/29/20 
0400 09/28/20 
0405 * 138 137  
K 4.8 4.6 4.5  
CL 99 101 100 CO2 25 30 26 * 46* 104* CREA 6.98* 3.57* 7.63* CA 8.4 8.5 8.4 * 294* 308* Assessment and Plan:  
1) MAURICIO-  Admission Cr 1.4. MAURICIO likely ATN from ischemic injury and contrast.  Remains oliguric. 
- Seen on SED for clearance and UF 
 
2) Hyperkalemia - resolved 3) Lactic Acidosis- resolved 4) STEMI- s/p LHC. Impella removed 5) Pulmonary edema- better 6) Hypotension - Levophed Carlos Vivas MD

## 2020-09-30 NOTE — PROGRESS NOTES
present via phone for an encounter with speech Therapist Dalene Kussmaul. Thank you, [facility] INTERPRETERTM Language Services Department 
[address] [place] [address] 
[phone] (phone)

## 2020-09-30 NOTE — PROGRESS NOTES
Bedside, Verbal and Written shift change report given to Allison (oncoming nurse) by Mennie Prader RN (offgoing nurse). Report included the following information SBAR, Kardex, ED Summary, Intake/Output, MAR, Recent Results, Cardiac Rhythm NSR and Alarm Parameters .

## 2020-09-30 NOTE — PROGRESS NOTES
A follow up visit was made to the patient. Emotional support, spiritual presence and  
prayer were provided for the patient. The family asked the  to help them get more family members in the hospital to visit. They had been granted special visiting privileges for a number of family members. The  contacted the ICU Coordinator, Dougie Blackman as well as discussed the family's request with the patient's nurse. The hospital's policy concerning visitors was communicated to the family by the patient's nurse. ELKIN Guzman

## 2020-09-30 NOTE — PROGRESS NOTES
Hospital  rounded over-the-phone with Nurse Ruslan Youssef and patient's son. Present during an encounter with nurse Harper and the patient's wife and daughter. Please consult the [facility] on-call calendar to find  on duty's contact information Thank you, Aminata Moreland [facility] INTERPRETERTM Language Services Department 
[address]  [facility] 
[phone] (phone)

## 2020-10-01 PROBLEM — I23.6 LV (LEFT VENTRICULAR) MURAL THROMBUS FOLLOWING MI (HCC): Status: ACTIVE | Noted: 2020-01-01

## 2020-10-01 PROBLEM — G93.41 METABOLIC ENCEPHALOPATHY: Status: ACTIVE | Noted: 2020-01-01

## 2020-10-01 NOTE — PROCEDURES
[facility] Neurology Routine Electroencephalogram Report DATE: October 1, 2020 time Start: 939 time End: 2810 EEG Number:  Indication: Altered mental status Medications:  
Current Facility-Administered Medications Medication Dose Route Frequency Provider Last Rate Last Dose  insulin NPH (NOVOLIN N, HUMULIN N) injection 22 Units  22 Units SubCUTAneous Q12H Ciesco Brayan, DO   22 Units at 10/01/20 0641  warfarin (COUMADIN) tablet 5 mg  5 mg Oral QHS Leigh Ann Myles MD      
 cefepime (MAXIPIME) 2 g in 0.9% sodium chloride (MBP/ADV) 100 mL  2 g IntraVENous Q24H Karri Munguia  mL/hr at 10/01/20 0904 2 g at 10/01/20 1009  aspirin chewable tablet 81 mg  81 mg Per NG tube DAILY Candy Gowers, MD   81 mg at 10/01/20 0921  
 atorvastatin (LIPITOR) tablet 80 mg  80 mg Per NG tube DAILY Candy Gowers, MD   80 mg at 10/01/20 5752  Vancomycin intermittent dosing per pharmacy    Other Rx Dosing/Monitoring Heide Phillips MD      
 famotidine (PEPCID) 40 mg/5 mL (8 mg/mL) oral suspension 20 mg  20 mg Oral BID PRN Sun Aldridge NP Or  
 famotidine (PF) (PEPCID) 20 mg in 0.9% sodium chloride 10 mL injection  20 mg IntraVENous BID PRN Sun Aldridge NP   20 mg at 10/01/20 1225  
 insulin regular (NOVOLIN R, HUMULIN R) injection   SubCUTAneous Q4H Brayan Zendejas, DO   8 Units at 10/01/20 1208  heparin (porcine) 1,000 unit/mL injection 5,000 Units  5,000 Units Hemodialysis DIALYSIS PRN Karlos Appiah MD      
 NOREPINephrine (LEVOPHED) 4 mg in 5% dextrose 250 mL infusion  0.5-16 mcg/min IntraVENous TITRATE Cynthia Arroyo MD 33.8 mL/hr at 10/01/20 1400 9 mcg/min at 10/01/20 1400  
 dexmedeTOMidine (PRECEDEX) 400 mcg in 0.9% sodium chloride 100 mL infusion  0.2-0.7 mcg/kg/hr IntraVENous TITRATE Keya Mott NP 8.2 mL/hr at 10/01/20 1203 0.4 mcg/kg/hr at 10/01/20 1203  
 senna-docusate (PERICOLACE) 8.6-50 mg per tablet 1 Tab  1 Tab Per NG tube DAILY Cynthia Arroyo MD   1 Tab at 10/01/20 5162  lip protectant (BLISTEX) ointment 1 Each  1 Each Topical PRN Jassi Adame MD      
 LORazepam (ATIVAN) injection 1 mg  1 mg IntraVENous Q2H PRN Candy Gowers, MD   1 mg at 10/01/20 6530  labetaloL (NORMODYNE;TRANDATE) injection 20 mg  20 mg IntraVENous Q6H PRN Leigh Ann Myles MD      
 sodium chloride (NS) flush 5-40 mL  5-40 mL IntraVENous Q8H Varun Davis MD   10 mL at 10/01/20 0600  
 sodium chloride (NS) flush 5-40 mL  5-40 mL IntraVENous PRN Varun Davis MD      
 acetaminophen (TYLENOL) tablet 650 mg  650 mg Oral Q4H PRN Varun Davis MD   650 mg at 10/01/20 0311  
 nitroglycerin (NITROSTAT) tablet 0.4 mg  0.4 mg SubLINGual Q5MIN PRN Varun Davis MD      
 [facility]) tablet 90 mg  90 mg Oral Q12H Varun Davis MD   90 mg at 10/01/20 0600  
 ondansetron (ZOFRAN) injection 4 mg  4 mg IntraVENous Q4H PRN Varun Davis MD   4 mg at 09/23/20 0745 Technique: The EEG was recorded on a 32 channel [employer] digital EEG machine. A full electrode headset was applied in accordance with the International 10-20 System of Electrode Placement. All impedances are less than 5000 Ohms. Time locked digital video of the patient was recorded and reviewed as needed for clinical correlation State of Consciousness: awake and drowsy Description: The waking EEG reveals continuous 5-7 Hz, 50-70 µV semirhythmic slowing which is present bilaterally and symmetrically. Reactivity to eye opening and eye closure is minimal.  No focal slowing or epileptiform activity is seen Activation Procedures: Hyperventilation: Was not performed Photic Stimulation: Did not alter the record Interpretation: This is a moderately abnormal EEG due to the presence of due to diffuse background slowing, finding that indicates a diffuse or metabolic disturbance of cerebral function.

## 2020-10-01 NOTE — CONSULTS
Palliative Care Patient: Ines Salcido MRN: [id number]  SSN: [id number] YOB: 1940  Age: 79 y.o. Sex: male Date of Request: 1/1/20 Date of Consult:  10/1/2020 Reason for Consult:  goals of care Requesting Physician: Dr Carlos Enrique Porter 
 
 Assessment/Plan:  
 
Principal Diagnosis:   
Encephalopathy, Unspecified  G93.40 Additional Diagnoses: · Advance Care Planning Counseling N49.60 · Dyspnea  R06.00 · Dysphagia  R13.10 · Frailty  R54 · Counseling, Encounter for Medical Advice  Z71.9 
· Encounter for Palliative Care  Z51.5 Palliative Performance Scale (PPS): PPS: 20 Medical Decision Making:  
Reviewed and summarized notes from admission to present. Discussed case with appropriate providers: ICU IDT rounds, TEQUILA Ramirez, [address] Kapoor, Chaplain Yelitza Ramirez Reviewed laboratory and x-ray data from admission to present. The pt is resting in bed, son Amalia Lazar (English speaking) and grandmattie Tony David (bilingual), at bedside. Via grandmattie Fuentes, Mr Carmell Bloch c/o acid reflux; ordered pepcid to be given via NGT. He denied pain, N/V, and other discomforts. In an extensive discussion with the two family members, we reviewed the pt's multiple conditions: STEMI and cardiogenic shock, likely stroke, need for HD, sHF, failed speech eval.  They understand that the pt is very ill, and will convey this to  Mrs Carmell Bloch. We reviewed that the pt is DNR, and discussed whether the pt and his wife would want him to be intubated for any period of time. Son Saira Rodas said that he feels she would want intubation and other aggressive care, and will discuss this with his mother and other family members. The family members' questions and concerns were answered and followed up. Caren Fuentes expressed hope that several family members could visit simultaneously. Discussed with LUCA Burnette, TEQUILA Ramirez, and [facility].   The plan is that the pt may have two family members visit at a time - one who is Romansh-speaking only (as is most of the family), and one who is bilingual.  RN Bea Carmichael will convey this to the next RN.  Lanny People expressed concern that the pt's needs on discharge for dialysis, rehab, and a possible Life Vest and AICD, may not be met because her understanding is that the pt is in the [address] illegally. In a second conversation with Genny Fontana and Bety Gil assured me that the pt has permanent resident status and that sister Yudy Carvajal would bring in the document this afternoon. Reuben Patel can be reached at [phone], and reina Ofe, who lives with the pt, at [phone]. Palliative Care will follow up with the pt's wife on her wishes concerning intubation and aggressiveness of care. Will discuss findings with members of the interdisciplinary team.   
 
Thank you for this referral.    
 
  
. 
In addition to the E&M described above, more than 50% of a 70-minute prolonged visit, from [phone], was spent on counseling and coordination of care. Subjective:  
 
History obtained from:  Family, Care Provider and Chart Chief Complaint: Encephalopathy, slowly improving History of Present Illness:   
 
  
Advance Directive: No      
Code Status:  DNR Health Care Power of : No - Patient does not have a [address]. Wife is health care surrogate; she receives support and advice from several of the 12 children. Past Medical History:  
Diagnosis Date  Diabetes ([facility]  Hypertension No past surgical history on file. No family history on file. Social History Tobacco Use  Smoking status: Never Smoker  Smokeless tobacco: Never Used Substance Use Topics  Alcohol use: Not on file Prior to Admission medications Medication Sig Start Date End Date Taking? Authorizing Provider  
amLODIPine (Norvasc) 10 mg tablet Take 10 mg by mouth daily.     Other, MD Claudy  
 hydroCHLOROthiazide (HYDRODIURIL) 25 mg tablet Take 25 mg by mouth daily. Claudy Joseph MD  
glyBURIDE-metFORMIN (GLUCOVANCE) 2.5-500 mg per tablet Take 2 Tabs by mouth two (2) times daily (with meals). Claudy Joseph MD  
 
 
No Known Allergies Review of Systems: 
Review of systems:  
Constitutional: debilitated, frail Respiratory: On O2 via NC 
GI: Acid reflux Objective:  
 
Visit Vitals /62 Pulse 100 Temp 97.2 °F (36.2 °C) Resp 12 Ht 5' 6\" (1.676 m) Wt 182 lb 15.7 oz (83 kg) SpO2 100% BMI 29.53 kg/m² Physical Exam: 
 
General:  Cooperative. No acute distress. Eyes:  Conjunctivae/corneas clear Nose: Nares normal. Septum midline. Neck: Supple, symmetrical, trachea midline, no JVD Lungs:   Clear to auscultation bilaterally, unlabored Heart:  Regular rate and rhythm, no murmur Abdomen:   Soft, non-tender, non-distended Extremities: Normal, atraumatic, no cyanosis or edema Skin: Skin color, texture, turgor normal. No rash or lesions. Neurologic: Nonfocal  
Psych: Alert and oriented Assessment:  
 
Hospital Problems  Never Reviewed Codes Class Noted POA  
 LV (left ventricular) mural thrombus following MI ([facility] ICD-10-CM: I23.6 ICD-9-CM: 429.79, 410.82  10/1/2020 Unknown Metabolic encephalopathy [id number]: G93.41 
ICD-9-CM: 348.31  10/1/2020 Unknown Cardiogenic shock ([facility] ICD-10-CM: R57.0 ICD-9-CM: 785.51  9/25/2020 Unknown Delirium ICD-10-CM: R41.0 ICD-9-CM: 780.09  9/25/2020 Unknown NSVT (nonsustained ventricular tachycardia) ([facility]) ICD-10-CM: I47.2 ICD-9-CM: 427.1  9/25/2020 Unknown Acute renal failure (ARF) ([facility]) ICD-10-CM: N17.9 ICD-9-CM: 584.9  9/23/2020 No  
   
 * (Principal) Acute ST elevation myocardial infarction (STEMI) involving left anterior descending (LAD) coronary artery ([facility]) ICD-10-CM: I21.02 
ICD-9-CM: 410.10  9/22/2020 Yes  Hypertension ICD-10-CM: [address] 
 ICD-9-CM: 401.9  9/22/2020 Yes Diabetes mellitus type 2, controlled ([facility] ICD-10-CM: E11.9 ICD-9-CM: 250.00  9/22/2020 Yes Signed By: Felix Fisher NP October 1, 2020

## 2020-10-01 NOTE — CONSULTS
Infectious Disease Consult Impression:  
· Leukocytosis: suspect reactive. No fever. Overall doubt infection. 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 · CAD with STEMI on admission · Type 2 DM 
· NSVT in cath lab; no recurrence. Plan: · Would consider getting remaining femoral line out as soon as possible. · Follow blood cx · Continue current ABX but if blood cx negative in 48-72 hours would stop ABX. · Repeat transaminases: if alk phos climbing could consider RUQ US but not tender on exam so would doubt acute acalculous cholecystitis or gangrenous GB. Anti-infectives: 1. CTX 9/22-9/26 2. Cefepime 10/1- 
3. Vanc 10/1- Subjective:  
Date of Consultation:  October 1, 2020 Date of Admission: 9/22/2020 Referring Physician: Bonnie Dee 
Reason for Consult: leukocytosis Patient is a 79 y.o. male with history of DM and HTN who was admitted on 9/22/2020 with complaints of chest pain with ST elevation consistent with acute MI. Heart cath showed occluded LAD and underwent PCI. Noted to be in cardiogenic shock and placed on Impella. Started on CTX on 9/22 for possible UTI:  Ua and culture negative. Developed MAURICIO felt due ATN from ischemia and contrastand has been started on SLED.  
9/26 consult to Neurology for AMS and again on 9/30 with Neurology noting LUE strength mismatch and suspecting CVA; likely embolic. Had been noted to have apical thrombus on TTE 9/25/20. being anticoagulated. Really n o fever of significance during hospitalization . WBC peaked around 25 on 9/24 and has trended down to 14.4 on 9/29 but hovering in mid teens since 9/27. Blood cx on 9?22 on admission negative. Reated today. Broad spectrum ABX restarted with Vanc and Cefepime. CXR negative on 9/28. AST 1531 on  along with , T. Bili 2.7 and alk phos 86. PCT 7.85 today but in setting renal failure this is hard to interpret and no prior values. Has been extubated and Impella removed from R groin; has line in left groin present since  and HD line in R IJ; both sites look ok. Has FMS with loose brown stool but on tube feeds. Head CT without contrast without hemorrhage and clear sinuses. Patient Active Problem List  
Diagnosis Code  Acute ST elevation myocardial infarction (STEMI) involving left anterior descending (LAD) coronary artery ([facility]) I21.02  
 Hypertension I10  
 Diabetes mellitus type 2, controlled ([facility] E11.9  Acute renal failure (ARF) ([facility]) N17.9  Cardiogenic shock ([facility]) R57.0  Delirium R41.0  NSVT (nonsustained ventricular tachycardia) ([facility]) I47.2 Past Medical History:  
Diagnosis Date  Diabetes ([facility]  Hypertension No family history on file. Social History Tobacco Use  Smoking status: Never Smoker  Smokeless tobacco: Never Used Substance Use Topics  Alcohol use: Not on file No past surgical history on file. Prior to Admission medications Medication Sig Start Date End Date Taking? Authorizing Provider  
amLODIPine (Norvasc) 10 mg tablet Take 10 mg by mouth daily. Other, MD Claudy  
hydroCHLOROthiazide (HYDRODIURIL) 25 mg tablet Take 25 mg by mouth daily. Claudy Joseph MD  
glyBURIDE-metFORMIN (GLUCOVANCE) 2.5-500 mg per tablet Take 2 Tabs by mouth two (2) times daily (with meals). Claudy Joseph MD  
 
No Known Allergies Review of Systems:  Review of systems not obtained due to patient factors. Objective:  
Blood pressure (!) 109/57, pulse 89, temperature (!) 96.7 °F (35.9 °C), resp. rate 27, height 5' 6\" (1.676 m), weight 83 kg (182 lb 15.7 oz), SpO2 100 %. Temp (24hrs), Av.6 °F (36.4 °C), Min:96.7 °F (35.9 °C), Max:98.1 °F (36.7 °C) Lines: R IJ, L femoral sheath Exam: General: NC/AT, awake, mildly agitated on exam, looks stated age, in NAD HEENT: PERRL, non-icteric sclera, no splinter hemorrhages Neck: supple, symmetric, no masses or lymphadenopathy Cardiac:Nl S1/S2, no murmurs/rubs/gallops/clicks, trace LE edema Pulmonary:clear bilaterally ermias/wheezes, good air movement Abdomen: soft, NT, non-distended, BS normal, Gentital:external genitalia normal Wise : none Skin:no rashes, lesions or wounds MSK:no enlarged or swollen joints in limbs or sternoclavicular area Extremities: no cords/clots/cyanosis, pulses palpable and symmetric Psychiatric: mildly agitated. Data Review:  
Recent Results (from the past 24 hour(s)) GLUCOSE, POC Collection Time: 09/30/20 12:51 PM  
Result Value Ref Range Glucose (POC) 265 (H) 65 - 100 mg/dL GLUCOSE, POC Collection Time: 09/30/20  4:21 PM  
Result Value Ref Range Glucose (POC) 221 (H) 65 - 100 mg/dL GLUCOSE, POC Collection Time: 09/30/20  7:38 PM  
Result Value Ref Range Glucose (POC) 212 (H) 65 - 100 mg/dL GLUCOSE, POC Collection Time: 10/01/20 12:35 AM  
Result Value Ref Range Glucose (POC) 260 (H) 65 - 100 mg/dL METABOLIC PANEL, BASIC Collection Time: 10/01/20  3:45 AM  
Result Value Ref Range Sodium 137 136 - 145 mmol/L Potassium 4.8 3.5 - 5.1 mmol/L Chloride 100 98 - 107 mmol/L  
 CO2 28 21 - 32 mmol/L Anion gap 9 7 - 16 mmol/L Glucose 274 (H) 65 - 100 mg/dL BUN 47 (H) 8 - 23 MG/DL Creatinine 3.44 (H) 0.8 - 1.5 MG/DL  
 GFR est AA 22 (L) >60 ml/min/1.73m2 GFR est non-AA 18 (L) >60 ml/min/1.73m2 Calcium 8.4 8.3 - 10.4 MG/DL  
CBC WITH AUTOMATED DIFF Collection Time: 10/01/20  3:45 AM  
Result Value Ref Range WBC 16.3 (H) 4.3 - 11.1 K/uL  
 RBC 2.86 (L) 4.23 - 5.6 M/uL HGB 8.1 (L) 13.6 - 17.2 g/dL HCT 24.5 (L) 41.1 - 50.3 % MCV 85.7 79.6 - 97.8 FL  
 MCH 28.3 26.1 - 32.9 PG  
 MCHC 33.1 31.4 - 35.0 g/dL  
 RDW 14.8 (H) 11.9 - 14.6 % PLATELET 674 440 - 661 K/uL MPV 12.1 9.4 - 12.3 FL ABSOLUTE NRBC 0.02 0.0 - 0.2 K/uL  
 DF AUTOMATED NEUTROPHILS 73 43 - 78 % LYMPHOCYTES 11 (L) 13 - 44 % MONOCYTES 12 4.0 - 12.0 % EOSINOPHILS 2 0.5 - 7.8 % BASOPHILS 0 0.0 - 2.0 % IMMATURE GRANULOCYTES 3 0.0 - 5.0 %  
 ABS. NEUTROPHILS 11.8 (H) 1.7 - 8.2 K/UL  
 ABS. LYMPHOCYTES 1.7 0.5 - 4.6 K/UL  
 ABS. MONOCYTES 2.0 (H) 0.1 - 1.3 K/UL  
 ABS. EOSINOPHILS 0.3 0.0 - 0.8 K/UL  
 ABS. BASOPHILS 0.1 0.0 - 0.2 K/UL  
 ABS. IMM. GRANS. 0.5 0.0 - 0.5 K/UL PROCALCITONIN Collection Time: 10/01/20  3:45 AM  
Result Value Ref Range Procalcitonin 7.85 ng/mL GLUCOSE, POC Collection Time: 10/01/20  5:02 AM  
Result Value Ref Range Glucose (POC) 286 (H) 65 - 100 mg/dL GLUCOSE, POC Collection Time: 10/01/20  9:08 AM  
Result Value Ref Range Glucose (POC) 373 (H) 65 - 100 mg/dL Microbiology:   
All Micro Results Procedure Component Value Units Date/Time CULTURE, BLOOD [[id number]] Collected:  10/01/20 0654 Order Status:  Completed Specimen:  Blood Updated:  10/01/20 1003 CULTURE, BLOOD [[id number]] Collected:  10/01/20 8600 Order Status:  Completed Specimen:  Blood Updated:  10/01/20 1002 CULTURE, BLOOD [[id number]] Collected:  09/22/20 2012 Order Status:  Completed Specimen:  Blood Updated:  09/27/20 6377 Special Requests: --     
  LEFT 
HAND Culture result: NO GROWTH 5 DAYS     
 CULTURE, BLOOD [[id number]] Collected:  09/22/20 2008 Order Status:  Completed Specimen:  Blood Updated:  09/27/20 3994 Special Requests: --     
  RIGHT 
HAND Culture result: NO GROWTH 5 DAYS     
 CULTURE, URINE [[id number]] Collected:  09/22/20 1897 Order Status:  Completed Specimen:  Cath Urine Updated:  09/25/20 5562 Special Requests: NO SPECIAL REQUESTS Culture result: NO GROWTH 2 DAYS Studies/Imaging:   
Transthoracic Echocardiogram 
2D and Doppler 
  
 Thalia Duran 
MR #: [id number] : [id number] Age: 79 years Gender: Male Study date: 25-Sep-2020 Account #: [de-identified] Height: 66 in 
Weight: 180 lb BSA: 1.91 mï¾² Status:Routine Location: 3109 BP: 119/ 69 
  
Allergies: NO KNOWN ALLERGENS 
  
Sonographer:  Chato Elizabeth RDCS Group:  [address] Cardiology Referring Physician:  Mehnaz Fields. MD [facility] Reading Physician:  Mehnaz Fields. MD [facility] 
  
INDICATIONS: Cardiogenic shock. 
  
*Impella was present and patient was scanned supine. * 
  
PROCEDURE: This was a routine study. A transthoracic echocardiogram was 
performed. The study included limited 2D imaging and limited spectral  
Doppler. Echocardiographic views were limited by restricted patient mobility and poor 
acoustic window availability. This was a technically difficult study. 
  
LEFT VENTRICLE: No apical clot seen. Systolic function was markedly reduced. Ejection fraction was estimated in the range of 15 % to 20 %. There was  
severe 
diffuse hypokinesis. There was akinesis of the mid-apical anterior, mid 
anteroseptal, midapical inferior, apical septal, apical lateral, and apical 
wall(s). There was a mass on the apicalwall, associated with an akinetic 
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 There was  
akinesis 
of the entire anterior, mid anteroseptal, and apical wall(s). 
  
SUMMARY: 
  
-  Left ventricle: No apical clot seen. Systolic function was markedly  
reduced. Ejection fraction was estimated in the range of 15 % to 20 %. There was  
severe 
diffuse hypokinesis. There was akinesis of the mid-apical anterior, mid 
anteroseptal, midapical inferior, apical septal, apical lateral, and apical 
wall(s). There was a mass on the apicalwall, associated with an akinetic 
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  There was  
akinesis 
of the entire anterior, mid anteroseptal, and apical wall(s). 
  
Prepared and signed by 
  
 Jessica Davis MD [facility] Signed 25-Sep-2020 11:48:50 Final [99]  9/28/2020  05:58  9/28/2020  06:31 Study Result EXAM: TEMPORARY 
  
INDICATION: Heart failure 
  
COMPARISON: 9/27/2020 
  
FINDINGS: A portable AP radiograph of the chest was obtained at 0555 hours. Left 
ventricular assist device in adequate position. Unremarkable dialysis catheter. Othelia Tray The lungs are clear. The cardiac and mediastinal contours and pulmonary 
vascularity are normal.  The bones and soft tissues are grossly within normal 
limits.  
  
IMPRESSION IMPRESSION: Normal chest.  
 
 
 
Signed By: Lisandra Jane MD   
 October 1, 2020

## 2020-10-01 NOTE — PROGRESS NOTES
Warfarin dosing per pharmacist 
 
yRan Dodd is a 79 y.o. male. Height: 5' 6\" (167.6 cm)    Weight: 83 kg (182 lb 15.7 oz)(bed scale not functioning, last filed value) Indication:  Apical thrombus Goal INR:  2.5-3.5 Home dose:  New start Risk factors or significant drug interactions:  -- Other anticoagulants:  -- 
 
Daily Monitoring Date  INR     Warfarin dose HGB              Notes 10/1  1.1  5 mg  8.1 Will start with 5 mg tonight and monitor INR daily. Please call with any questions. Thank you, Shelby Johnson, PharmD, [facility] Clinical [facility] 
[phone]

## 2020-10-01 NOTE — PROGRESS NOTES
SPEECH PATHOLOGY NOTE: 
 
Per RN, mentation remains significantly impaired and patient is not appropriate for po trials at this time. Will continue to follow.  
 
 
 
Elizabeth Obregon MS, CCC-SLP

## 2020-10-01 NOTE — PROGRESS NOTES
Chart reviewed and pt discussed in am IDR. Remains ICU. Precedex and levo gtts. Followed by Neurology, Cardiology, Nephrology, Hospitalist, ID, and now Palliative care. Pt remains confused per staff notes. SED per Nephrology. D/C POC will be difficult, as pt not citizen, and no payor source. He does have supportive family.  CM continues to follow for any assist.

## 2020-10-01 NOTE — PROGRESS NOTES
Pharmacokinetic Consult to Pharmacist 
 
Nereida Alfonso is a 79 y.o. male being treated with vancomycin. Height: 5' 6\" (167.6 cm)  Weight: 83 kg (182 lb 15.7 oz)(bed scale not functioning, last filed value) Lab Results Component Value Date/Time BUN 47 (H) 10/01/2020 03:45 AM  
 Creatinine 3.44 (H) 10/01/2020 03:45 AM  
 WBC 16.3 (H) 10/01/2020 03:45 AM  
 Procalcitonin 7.85 10/01/2020 03:45 AM  
 Lactic acid 1.3 09/30/2020 03:48 AM  
  
Estimated Creatinine Clearance: 17.6 mL/min (A) (based on SCr of 3.44 mg/dL (H)). CULTURES: 
pending Day 1 of vancomycin. Goal trough is 15-20. Vancomycin dose initiated at 1750 mg x 1, then dose around hemodialysis. Will continue to follow patient and order levels when clinically indicated. Thank you, Colleen Sicard, PharmD, [facility] 
[phone]

## 2020-10-01 NOTE — PROGRESS NOTES
Hospital  rounded over-the-phone with Staff. Please consult the [facility] on-call calendar to find  on duty's contact information Thank you, Zechariah Fabian CERTIFIED HEALTHCARE INTERPRETERTM Language Services Department 
pauline [address] [place] [address] 
[phone] (phone)

## 2020-10-01 NOTE — PROGRESS NOTES
Neurology Daily Progress Note Assessment:  
 
79year old male admitted for STEMI s/p PCI with persistent encephalopathy. He has an apical thromus on echo, currently being treated with warfarin and DAPT. The patient has decreased movement on the left side, concern for stroke. Will obtain MRI and EEG. Plan:  
 
Routine EEG 
 
MRI brain Continue aspirin, Brillinta, warfarin per cardiology Subjective: Interval history: 
 
Patient is able to follow commands. Able to move all extremities to command R>L. EEG in process. No acute changes on CT. History: 
 
Rosalina Taylor is a 79 y.o. male who is being seen for AMS. Review of systems negative with exception of pertinent positives and negatives noted above. Objective:  
 
Vitals:  
 10/01/20 1861 10/01/20 1349 10/01/20 0759 10/01/20 6039 BP: (!) 99/58 (!) 97/53 (!) 97/57 (!) 109/57 Pulse: 83 82 91 89 Resp: 12 28  27 Temp: (!) 96.7 °F (35.9 °C) SpO2: 100% 100% 100% 100% Weight:      
Height:      
  
 
 
Current Facility-Administered Medications:  
  insulin NPH (NOVOLIN N, HUMULIN N) injection 22 Units, 22 Units, SubCUTAneous, Q12H, Ciesco, Brayan, DO, 22 Units at 10/01/20 5177   warfarin (COUMADIN) tablet 5 mg, 5 mg, Oral, QHS, Tessa Myles MD 
  vancomycin (VANCOCIN) 1750 mg in  ml infusion, 1,750 mg, IntraVENous, ONCE, Asiya Chavez MD 
  cefepime (MAXIPIME) 2 g in 0.9% sodium chloride (MBP/ADV) 100 mL, 2 g, IntraVENous, Q24H, Asiya Chavez MD, Last Rate: 200 mL/hr at 10/01/20 0904, 2 g at 10/01/20 3373   aspirin chewable tablet 81 mg, 81 mg, Per NG tube, DAILY, Tessa Myles MD, 81 mg at 10/01/20 0921 
  atorvastatin (LIPITOR) tablet 80 mg, 80 mg, Per NG tube, DAILY, Tessa Myles MD, 80 mg at 10/01/20 0921 
  insulin regular (NOVOLIN R, HUMULIN R) injection, , SubCUTAneous, Q4H, Brayan Zendejas DO, 10 Units at 10/01/20 4689   heparin (porcine) 1,000 unit/mL injection 5,000 Units, 5,000 Units, Hemodialysis, DIALYSIS PRN, Moises Peralta MD 
  NOREPINephrine (LEVOPHED) 4 mg in 5% dextrose 250 mL infusion, 0.5-16 mcg/min, IntraVENous, TITRATE, Jaycob Han MD, Last Rate: 48.8 mL/hr at 10/01/20 0603, 13 mcg/min at 10/01/20 3935   dexmedeTOMidine (PRECEDEX) 400 mcg in 0.9% sodium chloride 100 mL infusion, 0.2-0.7 mcg/kg/hr, IntraVENous, TITRATE, Priya Rowe NP, Last Rate: 6.1 mL/hr at 10/01/20 0602, 0.3 mcg/kg/hr at 10/01/20 0602   senna-docusate (PERICOLACE) 8.6-50 mg per tablet 1 Tab, 1 Tab, Per NG tube, DAILY, Jaycob Han MD, 1 Tab at 10/01/20 5205   lip protectant (BLISTEX) ointment 1 Each, 1 Each, Topical, PRN, Marcell Underwood MD 
  LORazepam (ATIVAN) injection 1 mg, 1 mg, IntraVENous, Q2H PRN, Aimee Motley MD, 1 mg at 10/01/20 1345   labetaloL (NORMODYNE;TRANDATE) injection 20 mg, 20 mg, IntraVENous, Q6H PRN, Lenore Myles MD 
  sodium chloride (NS) flush 5-40 mL, 5-40 mL, IntraVENous, Q8H, Maria Luz Davis MD, 10 mL at 10/01/20 0600 
  sodium chloride (NS) flush 5-40 mL, 5-40 mL, IntraVENous, PRN, Maria Luz Davis MD 
  acetaminophen (TYLENOL) tablet 650 mg, 650 mg, Oral, Q4H PRN, Maria Luz Davis MD, 650 mg at 10/01/20 7493 
  nitroglycerin (NITROSTAT) tablet 0.4 mg, 0.4 mg, SubLINGual, Q5MIN PRN, Maria Luz Davis MD 
  ticagror [facility]) tablet 90 mg, 90 mg, Oral, Q12H, Maria Luz Davis MD, 90 mg at 10/01/20 0600 
  ondansetron (ZOFRAN) injection 4 mg, 4 mg, IntraVENous, Q4H PRN, Maria Luz Davis MD, 4 mg at 09/23/20 0745 Recent Results (from the past 12 hour(s)) GLUCOSE, POC Collection Time: 10/01/20 12:35 AM  
Result Value Ref Range Glucose (POC) 260 (H) 65 - 100 mg/dL METABOLIC PANEL, BASIC Collection Time: 10/01/20  3:45 AM  
Result Value Ref Range Sodium 137 136 - 145 mmol/L  Potassium 4.8 3.5 - 5.1 mmol/L  
 Chloride 100 98 - 107 mmol/L  
 CO2 28 21 - 32 mmol/L Anion gap 9 7 - 16 mmol/L Glucose 274 (H) 65 - 100 mg/dL BUN 47 (H) 8 - 23 MG/DL Creatinine 3.44 (H) 0.8 - 1.5 MG/DL  
 GFR est AA 22 (L) >60 ml/min/1.73m2 GFR est non-AA 18 (L) >60 ml/min/1.73m2 Calcium 8.4 8.3 - 10.4 MG/DL  
CBC WITH AUTOMATED DIFF Collection Time: 10/01/20  3:45 AM  
Result Value Ref Range WBC 16.3 (H) 4.3 - 11.1 K/uL  
 RBC 2.86 (L) 4.23 - 5.6 M/uL HGB 8.1 (L) 13.6 - 17.2 g/dL HCT 24.5 (L) 41.1 - 50.3 % MCV 85.7 79.6 - 97.8 FL  
 MCH 28.3 26.1 - 32.9 PG  
 MCHC 33.1 31.4 - 35.0 g/dL  
 RDW 14.8 (H) 11.9 - 14.6 % PLATELET 078 378 - 865 K/uL MPV 12.1 9.4 - 12.3 FL ABSOLUTE NRBC 0.02 0.0 - 0.2 K/uL  
 DF AUTOMATED NEUTROPHILS 73 43 - 78 % LYMPHOCYTES 11 (L) 13 - 44 % MONOCYTES 12 4.0 - 12.0 % EOSINOPHILS 2 0.5 - 7.8 % BASOPHILS 0 0.0 - 2.0 % IMMATURE GRANULOCYTES 3 0.0 - 5.0 %  
 ABS. NEUTROPHILS 11.8 (H) 1.7 - 8.2 K/UL  
 ABS. LYMPHOCYTES 1.7 0.5 - 4.6 K/UL  
 ABS. MONOCYTES 2.0 (H) 0.1 - 1.3 K/UL  
 ABS. EOSINOPHILS 0.3 0.0 - 0.8 K/UL  
 ABS. BASOPHILS 0.1 0.0 - 0.2 K/UL  
 ABS. IMM. GRANS. 0.5 0.0 - 0.5 K/UL PROCALCITONIN Collection Time: 10/01/20  3:45 AM  
Result Value Ref Range Procalcitonin 7.85 ng/mL GLUCOSE, POC Collection Time: 10/01/20  5:02 AM  
Result Value Ref Range Glucose (POC) 286 (H) 65 - 100 mg/dL GLUCOSE, POC Collection Time: 10/01/20  9:08 AM  
Result Value Ref Range Glucose (POC) 373 (H) 65 - 100 mg/dL CT Results (most recent): 
Results from [facility] Encounter encounter on 09/22/20 CT HEAD WO CONT Narrative CT HEAD WITHOUT CONTRAST HISTORY:  CVA, left hemiplegia. Deboraha Buys COMPARISON: None. TECHNIQUE: Axial imaging was performed without intravenous contrast utilizing 5mm slice thickness. Sagittal and coronal reformats were performed. Radiation 
dose reduction techniques were used for this study. Our CT scanner uses one or all of the following: Automated exposure control, adjustment of the MAS or KUB according to patient's 
size and iterative reconstruction. FINDINGS:     
 
*BRAIN:  
   -  There are no early signs of territorial or lacunar infarction by CT. 
   -  Symmetric supratentorial atrophy. -  For patient's age, the scattered areas of white matter hypodensities may 
represent a chronic small vessel white matter ischemia. However this is 
nonspecific. *VISUALIZED PARANASAL SINUSES: Well aerated. *MASTOIDS:  Clear. *CALVARIUM AND SCALP: Unremarkable. Impression IMPRESSION: 
 
No evidence of acute intracranial abnormalities. Date of Dictation: 10/1/2020 1:38 AM 
  
 
Echo Results  (Last 48 hours) None Physical Exam: 
General - Well developed, well nourished, in no apparent distress. Pleasant and conversent. HEENT - Normocephalic, atraumatic. Conjunctiva are clear. Neck - Supple without masses Extremities - Peripheral pulses intact. No edema and no rashes. Neurological examination - Comprehension, attention, memory and reasoning are impaired. Unable to assess language or speech. On cranial nerve examination, (II, III, IV, VI) pupils are equal, round, and reactive to light. Unable to assess visual acuity or visual fields. Extraocular motility is normal. (V, VII) Face is symmetric(VIII) Hearing is intact. Motor examination - There is normal muscle tone and bulk. Strength is 4/5 in RUE, RLE, 1/5 in LUE. 3/5 in LLE. Muscle stretch reflexes are 2+ in BUE, 1+ BLE. Unable to assess sensation, cerebellar function, or gait. Signed By: Simi Murillo NP October 1, 2020

## 2020-10-01 NOTE — PROGRESS NOTES
[facility] PROGRESS NOTE Follow up for: MAURICIO 
 
 
ROS:  UTO Objective:  
Exam: 
Vitals:  
 10/01/20 6719 10/01/20 6446 10/01/20 0759 10/01/20 7324 BP: (!) 99/58 (!) 97/53 (!) 97/57 (!) 109/57 Pulse: 83 82 91 89 Resp: 12 28  27 Temp: (!) 96.7 °F (35.9 °C) SpO2: 100% 100% 100% 100% Weight:      
Height:      
 
 
 
Intake/Output Summary (Last 24 hours) at 10/1/2020 0901 Last data filed at 10/1/2020 5533 Gross per 24 hour Intake 2690.66 ml Output 2997 ml Net -306.34 ml Current Facility-Administered Medications Medication Dose Route Frequency  insulin NPH (NOVOLIN N, HUMULIN N) injection 22 Units  22 Units SubCUTAneous Q12H  warfarin (COUMADIN) tablet 5 mg  5 mg Oral QHS  vancomycin (VANCOCIN) 1750 mg in  ml infusion  1,750 mg IntraVENous ONCE  
 cefepime (MAXIPIME) 2 g in 0.9% sodium chloride (MBP/ADV) 100 mL  2 g IntraVENous Q24H  
 aspirin chewable tablet 81 mg  81 mg Per NG tube DAILY  atorvastatin (LIPITOR) tablet 80 mg  80 mg Per NG tube DAILY  insulin regular (NOVOLIN R, HUMULIN R) injection   SubCUTAneous Q4H  
 heparin (porcine) 1,000 unit/mL injection 5,000 Units  5,000 Units Hemodialysis DIALYSIS PRN  
 NOREPINephrine (LEVOPHED) 4 mg in 5% dextrose 250 mL infusion  0.5-16 mcg/min IntraVENous TITRATE  dexmedeTOMidine (PRECEDEX) 400 mcg in 0.9% sodium chloride 100 mL infusion  0.2-0.7 mcg/kg/hr IntraVENous TITRATE  senna-docusate (PERICOLACE) 8.6-50 mg per tablet 1 Tab  1 Tab Per NG tube DAILY  lip protectant (BLISTEX) ointment 1 Each  1 Each Topical PRN  
 LORazepam (ATIVAN) injection 1 mg  1 mg IntraVENous Q2H PRN  
 labetaloL (NORMODYNE;TRANDATE) injection 20 mg  20 mg IntraVENous Q6H PRN  
 sodium chloride (NS) flush 5-40 mL  5-40 mL IntraVENous Q8H  
 sodium chloride (NS) flush 5-40 mL  5-40 mL IntraVENous PRN  
 acetaminophen (TYLENOL) tablet 650 mg  650 mg Oral Q4H PRN  
  nitroglycerin (NITROSTAT) tablet 0.4 mg  0.4 mg SubLINGual Q5MIN PRN  
 ticagrelor (BRILINTA) tablet 90 mg  90 mg Oral Q12H  
 ondansetron (ZOFRAN) injection 4 mg  4 mg IntraVENous Q4H PRN  
 
 
EXAM 
GEN - just moaning CV - S1, S2, RRR, no rub, murmur, or gallop Lung - CTA Abd - soft, nontender, BS present Ext - no edema Recent Labs 10/01/20 
0345 09/30/20 
0348 09/29/20 
0400 WBC 16.3* 15.0* 14.4* HGB 8.1* 8.0* 8.4* HCT 24.5* 24.4* 24.6*  
 148* 133* Recent Labs 10/01/20 
0345 09/30/20 
0348 09/29/20 
0400  135* 138  
K 4.8 4.8 4.6  99 101 CO2 28 25 30 BUN 47* 112* 46* CREA 3.44* 6.98* 3.57* CA 8.4 8.4 8.5 * 254* 294* Assessment and Plan:  
1) MAURICIO-  Admission Cr 1.4. MAURICIO likely ATN from ischemic injury and contrast.  Remains oliguric. 
- SED tomorrow 2) Hyperkalemia - resolved 3) Lactic Acidosis- resolved 4) STEMI- s/p LHC. Impella removed 5) Pulmonary edema- better 6) Hypotension - Levophed Gaurang Horne MD

## 2020-10-01 NOTE — PROGRESS NOTES
[facility] staff  available over the phone from 3:30 p.m. - midnight. Please call Kayla at [phone] with any interpreting requests. Deirdre Mcmillan CERTIFIED HEALTHCARE INTERPRETERTM Language Services Department 
[address] 
[phone] (phone)

## 2020-10-01 NOTE — PROGRESS NOTES
Called by nursing staff to look at EKG that was done for burning type chest pain. EKG compared to prior EKGs and improved. Discussed with Dr. Raymond Mandel that also reviewed EKG and mylanta ordered.

## 2020-10-01 NOTE — PROGRESS NOTES
Progress Note Patient: Rosalina Taylor MRN: [id number]  SSN: [id number] YOB: 1940  Age: 79 y.o. Sex: male Admit Date: 9/22/2020 LOS: 9 days Subjective:  
 
Rosalina Taylor is a 79 y.o. male who is being seen for consultation for DM management, MAURICIO and hyperkalemia.  
  
Patient has DM and hypertension. He speaks only [place] and daughter is on the phone helping with communication.  
  
Patient has been admitted on 9/22/2020 due to chest pain. He had STEMI and underwent PTCA.  
  
\" PROCEDURES PERFORMED: 1.  Left heart cath, selective coronary arteriography, and left ventriculogram via the right radial approach. 2.  PCI and stenting of the LAD. 3.  IVUS of LAD after stenting. 4.  Impella CP LVAD placement via the right common femoral artery. 5.  Placement of a continuous cardiac output [place] catheter via the left femoral vein. \"  
  
His BS is elevated. His creatinine is up after left heart catheterization and he has hyperkalemia and metabolic acidosis. Nephrology is consulted. Patient has made little urine. He has been on HD for the past 3 days with SLED. Patient tolerated it. Interval History (10/1): patient examined at bedside. No acute overnight events. Levophed requirements increased overnight. Family at bedside, state that his mentation is better today than yesterday, [facility] No obvious signs of distress, still not moving his left arm that much. Objective:  
 
Vitals:  
 10/01/20 1044 10/01/20 1059 10/01/20 1114 10/01/20 1129 BP: 109/62 (!) 108/58 114/60 118/62 Pulse: 100 99 99 100 Resp: 11 15 25 12 Temp:   97.2 °F (36.2 °C) SpO2: 99% 100% 99% 100% Weight:      
Height:      
  
 
Intake and Output: 
Current Shift: 10/01 0701 - 10/01 1900 In: 240 Out: - Last three shifts: 09/29 1901 - 10/01 0700 In: 3661.4 [I.V.:1030.4] Out: 2983 [Drains:50] Physical Exam: General:                    elderly male that is encephalopathic XLCE:                                   [id number]. Eyes:                                   palpebral pallor, no scleral icterus. ENT:                                    External auricular and nasal exam within normal limits.                                             JCWSXS membranes are moist. 
Neck:                                   Supple, non-tender, no JVD. Right IJ catheter is in place Lungs:                       decreased to auscultation bilaterally without wheezes or crackles.                                             No respiratory distress or accessory muscle use. Heart:                                  Regular rate and rhythm, without murmurs, rubs, or gallops. Abdomen:                  Soft, non-tender, mildly distended with normoactive bowel sounds. Genitourinary:           No tenderness over the bladder or bilateral CVAs. Wise's catheter in place. Extremities:               Without clubbing, cyanosis,mild edema. Right groin with catheter in place. Skin:                                    Normal color, texture, and turgor. No rashes, lesions, or jaundice. Pulses:                      Radial and dorsalis pedis pulses present 2+ bilaterally.  
                                            Capillary refill <2s. Neurologic:                moves all four extremities, not awake or oriented Lab/Data Review: 
 
Recent Results (from the past 24 hour(s)) GLUCOSE, POC Collection Time: 09/30/20  4:21 PM  
Result Value Ref Range Glucose (POC) 221 (H) 65 - 100 mg/dL GLUCOSE, POC Collection Time: 09/30/20  7:38 PM  
Result Value Ref Range Glucose (POC) 212 (H) 65 - 100 mg/dL GLUCOSE, POC Collection Time: 10/01/20 12:35 AM  
Result Value Ref Range Glucose (POC) 260 (H) 65 - 100 mg/dL METABOLIC PANEL, BASIC Collection Time: 10/01/20  3:45 AM  
Result Value Ref Range Sodium 137 136 - 145 mmol/L Potassium 4.8 3.5 - 5.1 mmol/L Chloride 100 98 - 107 mmol/L  
 CO2 28 21 - 32 mmol/L Anion gap 9 7 - 16 mmol/L Glucose 274 (H) 65 - 100 mg/dL BUN 47 (H) 8 - 23 MG/DL Creatinine 3.44 (H) 0.8 - 1.5 MG/DL  
 GFR est AA 22 (L) >60 ml/min/1.73m2 GFR est non-AA 18 (L) >60 ml/min/1.73m2 Calcium 8.4 8.3 - 10.4 MG/DL  
CBC WITH AUTOMATED DIFF Collection Time: 10/01/20  3:45 AM  
Result Value Ref Range WBC 16.3 (H) 4.3 - 11.1 K/uL  
 RBC 2.86 (L) 4.23 - 5.6 M/uL HGB 8.1 (L) 13.6 - 17.2 g/dL HCT 24.5 (L) 41.1 - 50.3 % MCV 85.7 79.6 - 97.8 FL  
 MCH 28.3 26.1 - 32.9 PG  
 MCHC 33.1 31.4 - 35.0 g/dL  
 RDW 14.8 (H) 11.9 - 14.6 % PLATELET 743 902 - 470 K/uL MPV 12.1 9.4 - 12.3 FL ABSOLUTE NRBC 0.02 0.0 - 0.2 K/uL  
 DF AUTOMATED NEUTROPHILS 73 43 - 78 % LYMPHOCYTES 11 (L) 13 - 44 % MONOCYTES 12 4.0 - 12.0 % EOSINOPHILS 2 0.5 - 7.8 % BASOPHILS 0 0.0 - 2.0 % IMMATURE GRANULOCYTES 3 0.0 - 5.0 %  
 ABS. NEUTROPHILS 11.8 (H) 1.7 - 8.2 K/UL  
 ABS. LYMPHOCYTES 1.7 0.5 - 4.6 K/UL  
 ABS. MONOCYTES 2.0 (H) 0.1 - 1.3 K/UL  
 ABS. EOSINOPHILS 0.3 0.0 - 0.8 K/UL  
 ABS. BASOPHILS 0.1 0.0 - 0.2 K/UL  
 ABS. IMM. GRANS. 0.5 0.0 - 0.5 K/UL PROCALCITONIN Collection Time: 10/01/20  3:45 AM  
Result Value Ref Range Procalcitonin 7.85 ng/mL GLUCOSE, POC Collection Time: 10/01/20  5:02 AM  
Result Value Ref Range Glucose (POC) 286 (H) 65 - 100 mg/dL GLUCOSE, POC Collection Time: 10/01/20  9:08 AM  
Result Value Ref Range Glucose (POC) 373 (H) 65 - 100 mg/dL PROTHROMBIN TIME + INR Collection Time: 10/01/20  9:23 AM  
Result Value Ref Range Prothrombin time 14.4 12.0 - 14.7 sec INR 1.1 GLUCOSE, POC Collection Time: 10/01/20 12:06 PM  
Result Value Ref Range Glucose (POC) 330 (H) 65 - 100 mg/dL XR chest  
9/23/2020 IMPRESSION: 
  
1.  Improved aeration of the lungs, consistent with resolving pulmonary edema.  
 
XR chest  
9/24/2020 IMPRESSION: 
  
1. Progression of bilateral interstitial densities, likely pulmonary edema. Current Facility-Administered Medications:  
  insulin NPH (NOVOLIN N, HUMULIN N) injection 22 Units, 22 Units, SubCUTAneous, Q12H, CizacharycoBrayan, DO, 22 Units at 10/01/20 2764   warfarin (COUMADIN) tablet 5 mg, 5 mg, Oral, QHS, Anjali Myles MD 
  cefepime (MAXIPIME) 2 g in 0.9% sodium chloride (MBP/ADV) 100 mL, 2 g, IntraVENous, Q24H, Sandra Schaeffer MD, Last Rate: 200 mL/hr at 10/01/20 0904, 2 g at 10/01/20 6878   aspirin chewable tablet 81 mg, 81 mg, Per NG tube, DAILY, Anjali Myles MD, 81 mg at 10/01/20 0921 
  atorvastatin (LIPITOR) tablet 80 mg, 80 mg, Per NG tube, DAILY, Ni Chin MD, 80 mg at 10/01/20 8158   Vancomycin intermittent dosing per pharmacy , , Other, Rx Dosing/Monitoring, Paige Carrizales MD 
  famotidine (PEPCID) 40 mg/5 mL (8 mg/mL) oral suspension 20 mg, 20 mg, Oral, BID PRN **OR** famotidine (PF) (PEPCID) 20 mg in 0.9% sodium chloride 10 mL injection, 20 mg, IntraVENous, BID PRN, Jacy Darling NP, 20 mg at 10/01/20 1225 
  insulin regular (NOVOLIN R, HUMULIN R) injection, , SubCUTAneous, Q4H, CiesBrayan duncan, DO, 8 Units at 10/01/20 1208   heparin (porcine) 1,000 unit/mL injection 5,000 Units, 5,000 Units, Hemodialysis, DIALYSIS PRN, Gemma Peterson MD 
  NOREPINephrine (LEVOPHED) 4 mg in 5% dextrose 250 mL infusion, 0.5-16 mcg/min, IntraVENous, TITRATE, Ayana Gutierrez MD, Last Rate: 41.3 mL/hr at 10/01/20 1202, 11 mcg/min at 10/01/20 1202   dexmedeTOMidine (PRECEDEX) 400 mcg in 0.9% sodium chloride 100 mL infusion, 0.2-0.7 mcg/kg/hr, IntraVENous, TITRATE, Gurvinder ESTRADA NP, Last Rate: 8.2 mL/hr at 10/01/20 1203, 0.4 mcg/kg/hr at 10/01/20 1203 
  senna-docusate (PERICOLACE) 8.6-50 mg per tablet 1 Tab, 1 Tab, Per NG tube, DAILY, Ayana Gutierrez MD, 1 Tab at 10/01/20 3426   lip protectant (BLISTEX) ointment 1 Each, 1 Each, Topical, PRN, Geoff Shannon MD 
  LORazepam (ATIVAN) injection 1 mg, 1 mg, IntraVENous, Q2H PRN, Mildred Landis MD, 1 mg at 10/01/20 5083   labetaloL (NORMODYNE;TRANDATE) injection 20 mg, 20 mg, IntraVENous, Q6H PRN, Maria Del Rosario Myles MD 
  sodium chloride (NS) flush 5-40 mL, 5-40 mL, IntraVENous, Q8H, Sarath Davis MD, 10 mL at 10/01/20 0600 
  sodium chloride (NS) flush 5-40 mL, 5-40 mL, IntraVENous, PRN, Sarath Davis MD 
  acetaminophen (TYLENOL) tablet 650 mg, 650 mg, Oral, Q4H PRN, Sarath Davis MD, 650 mg at 10/01/20 9287 
  nitroglycerin (NITROSTAT) tablet 0.4 mg, 0.4 mg, SubLINGual, Q5MIN PRN, Sarath Davis MD 
  ticagror [facility]) tablet 90 mg, 90 mg, Oral, Q12H, Sarath Davis MD, 90 mg at 10/01/20 0600 
  ondansetron (ZOFRAN) injection 4 mg, 4 mg, IntraVENous, Q4H PRN, Sarath Davis MD, 4 mg at 09/23/20 0745 Assessment:  
 
Principal Problem: 
  Acute ST elevation myocardial infarction (STEMI) involving left anterior descending (LAD) coronary artery ([facility] [address] (9/22/2020) Active Problems: Hypertension (9/22/2020) Diabetes mellitus type 2, controlled ([facility] [address] (9/22/2020) Acute renal failure (ARF) ([facility] [address] (9/23/2020) [facility] (9/25/2020) Delirium (9/25/2020) NSVT (nonsustained ventricular tachycardia) ([facility] [address] (9/25/2020) LV (left ventricular) mural thrombus following MI ([facility] [address] (10/1/2020) Metabolic encephalopathy (48/0/4043) Plan:  
 
# STEMI s/p PTCA now complicated by cardiogenic shock on Levophed gtt 
- cardiology managing - Impella discontinued 
- on Levoped gtt # Cardiogenic shock, ?septic shock (less likely) 
- on Levophed with known cardiomyopathy as above - blood/urine cultures have been negative for earlier in admission but with persistently elevated WBC count 
- will repeat blood cultures and consult ID for any further recs - wean pressors as tolerated # Worsening confusion/upper extremity weakness with concern for CVA 
- known LV apical thrombus, heparin held with discontinuation of Impella - CT head negative - MRI head pending 
- neurology following, appreciate recs 
- anticoagulation per neurology and cardiology 
- EEG pending 
- has been on Precedex gtt for confusion/agitation 
- avoid sedative/hypnotics and narcotics # Ischemic cardiomyopathy 
- continue with ASA and Brilinta 
- management as above # Anuric MAURICIO, likely due to combination of IV contrast and cardiac ischemia 
- nephrology following, on dialysis - strict I's/O's and daily weights 
- avoid NSAIDs, IV contrast, and nephrotoxic meds 
- serial BMPs # DM type II 
- increase NPH from 18 units to 22 units SQ q12h 
- switch from Humalog to regular insulin SSI 
- serial CBGs between 140-180 
- patient on continuous tube feeds Ppx: SCDs for VTE Thank you for this consult. Hospitalist will continue to follow along. Please page/call with questions or concerns. Signed By: Tia Antunez DO October 1, 2020

## 2020-10-01 NOTE — PROGRESS NOTES
Bedside shift change report given to [facility] (oncoming nurse) by Amy Glover (offgoing nurse). Report included the following information SBAR, Kardex, ED Summary, Intake/Output, MAR, Recent Results, Cardiac Rhythm NSR and Alarm Parameters .

## 2020-10-01 NOTE — PROGRESS NOTES
A follow up visit was made to the patient. Emotional support, spiritual presence and  
prayer were provided for the patient. The  had an extended meeting with the patient's nurse and the Palliative Care NurseDebora about the patient's needs and the special concerns of visiting for the patient's family. Emily Tidwell MDIV

## 2020-10-01 NOTE — PROGRESS NOTES
[facility] CARDIOLOGY PROGRESS NOTE 
 
10/1/2020 7:48 AM 
 
Admit Date: 9/22/2020 Subjective:  
Stable overnight but intermittently confused/agitated. Labs stable, BP stable but levophed requirements up over night. Denies CP, SOB or palpitations. Sinus on tele. Objective:  
  
Vitals:  
 10/01/20 2210 10/01/20 8317 10/01/20 9490 10/01/20 7903 BP: (!) 103/56 101/65 (!) 101/57 (!) 99/58 Pulse: 87 84 80 83 Resp: 20 20 12 Temp:    (!) 96.7 °F (35.9 °C) SpO2: 100% 100% 100% 100% Weight:      
Height:      
 
 
Physical Exam: 
Neck- supple, difficult to assess JVD at 60 deg CV- regular rate and rhythm no MRG Lung- clear bilaterally anteriorly, dull/decreased lateral bases Abd- soft, nontender, nondistended Ext- no edema distally, groin sites CDI, left groin venous sheath CDI Skin- warm and dry Data Review:  
Recent Labs 10/01/20 
0345 09/30/20 
7827  135* K 4.8 4.8  
BUN 47* 112* CREA 3.44* 6.98* * 254* WBC 16.3* 15.0* HGB 8.1* 8.0*  
HCT 24.5* 24.4*  
 148* Assessment and Plan:  
 
 Principal Problem: 
  Acute ST elevation myocardial infarction (STEMI) involving left anterior descending (LAD) coronary artery ([facility] [address] (9/22/2020) S/P PCI of prox to mid LAD. On ASA and Brilinta. Active Problems: 
 
  Acute cystitis without hematuria (9/22/2020) Treated, monitor clinically. Hypertension (9/22/2020) Off hydralazine and coreg now, titrate levophed as needed. systolics 080 this AM on 14 mics 
  
  Diabetes mellitus type 2, controlled ([address] (9/22/2020) Continue insulin per ICU protocol 
  
  Acute renal failure (ARF) ([address] (9/23/2020) Appreciate nephrology assistance. Continue dialysis per renal direction 
  
  Cardiogenic shock ([address] (9/25/2020) Impella removed, stable but on levophed. Continue to monitor, off all CHF meds 
  
  Delirium (9/25/2020) Complicates management. intermittent/waxing/waning.  In mittens today.  
  
 NSVT (nonsustained ventricular tachycardia) ([facility] [address] (9/25/2020) Occurred in cath lab. No recurrence. Stopped amiodarone without recurrence, follow tele.  
  
Apical thrombus Seen on prior echo. On dialysis now. Start coumadin tonight, target INR 2.5-3.5 with apical thrombus. Stop ASA when INR >2.   
  
 
LIZETTE Cortes MD 
[facility] Cardiology Pager [phone]

## 2020-10-02 PROBLEM — R74.8 ELEVATED LIPASE: Status: ACTIVE | Noted: 2020-01-01

## 2020-10-02 PROBLEM — J18.9 PNEUMONIA DUE TO INFECTIOUS ORGANISM: Status: ACTIVE | Noted: 2020-01-01

## 2020-10-02 PROBLEM — R93.2 ABNORMAL CT SCAN, GALLBLADDER: Status: ACTIVE | Noted: 2020-01-01

## 2020-10-02 PROBLEM — R10.84 GENERALIZED ABDOMINAL PAIN: Status: ACTIVE | Noted: 2020-01-01

## 2020-10-02 NOTE — PROGRESS NOTES
Infectious Disease Progress Note Impression:  
· Leukocytosis: suspect reactive. No fever. Overall doubt infection. 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 · CAD with STEMI on admission · Type 2 DM 
· NSVT in cath lab; no recurrence. Plan: · Would consider getting remaining femoral line out as soon as possible. · Follow blood cx 10/1/20. · Continue current ABX but if blood cx negative in 48-72 hours would stop ABX. · Repeat transaminases are much improved, consistent with elevation due shock liver. Not tender in RUQ on exam so would doubt acute acalculous cholecystitis or gangrenous GB. · Diarrhea on tube feeds:  Has had earlier ABX but since no abd pain or fever, diarrhea may just be from feeds Anti-infectives: 1. CTX 9/22-9/26 2. Cefepime 10/1- 
3. Vanc 10/1- Subjective: Interval History: appears off pressors this AM.  Restless and not responsive on precedex. No fever. WBC 20.6. L femoral sheeth removed. R IJ HD line remains. Patient is a 79 y.o. male with history of DM and HTN who was admitted on 9/22/2020 with complaints of chest pain with ST elevation consistent with acute MI. Heart cath showed occluded LAD and underwent PCI. Noted to be in cardiogenic shock and placed on Impella. Started on CTX on 9/22 for possible UTI:  Ua and culture negative. Developed MAURICIO felt due ATN from ischemia and contrastand has been started on SLED.  
9/26 consult to Neurology for AMS and again on 9/30 with Neurology noting LUE strength mismatch and suspecting CVA; likely embolic. Had been noted to have apical thrombus on TTE 9/25/20. being anticoagulated. Really n o fever of significance during hospitalization .   WBC peaked around 25 on 9/24 and has trended down to 14.4 on 9/29 but hovering in mid teens since 9/27. Blood cx on 9?22 on admission negative. Reated today. Broad spectrum ABX restarted with Vanc and Cefepime. CXR negative on 9/28. AST 1531 on 9/26 along with , T. Bili 2.7 and alk phos 86. PCT 7.85 today but in setting renal failure this is hard to interpret and no prior values. Has been extubated and Impella removed from R groin; has line in left groin present since 9/22 and HD line in R IJ; both sites look ok. Has FMS with loose brown stool but on tube feeds. Head CT without contrast without hemorrhage and clear sinuses. Patient Active Problem List  
Diagnosis Code  Acute ST elevation myocardial infarction (STEMI) involving left anterior descending (LAD) coronary artery ([facility]) I21.02  
 Hypertension I10  
 Diabetes mellitus type 2, controlled ([facility] [address] E11.9  Acute renal failure (ARF) ([facility]) N17.9  Cardiogenic shock ([facility]) R57.0  Delirium R41.0  NSVT (nonsustained ventricular tachycardia) ([facility]) I47.2  LV (left ventricular) mural thrombus following MI ([facility]) I23.6  Metabolic encephalopathy H22.95 Past Medical History:  
Diagnosis Date  Diabetes ([facility] [address]  Hypertension No family history on file. Social History Tobacco Use  Smoking status: Never Smoker  Smokeless tobacco: Never Used Substance Use Topics  Alcohol use: Not on file No past surgical history on file. Prior to Admission medications Medication Sig Start Date End Date Taking? Authorizing Provider  
amLODIPine (Norvasc) 10 mg tablet Take 10 mg by mouth daily. Claudy Joseph MD  
hydroCHLOROthiazide (HYDRODIURIL) 25 mg tablet Take 25 mg by mouth daily. Claudy Joseph MD  
glyBURIDE-metFORMIN (GLUCOVANCE) 2.5-500 mg per tablet Take 2 Tabs by mouth two (2) times daily (with meals). Claudy Joseph MD  
 
No Known Allergies Review of Systems:  Review of systems not obtained due to patient factors. Objective: Blood pressure 100/64, pulse 95, temperature 98.5 °F (36.9 °C), resp. rate 26, height 5' 6\" (1.676 m), weight 83 kg (182 lb 15.7 oz), SpO2 94 %. Temp (24hrs), Av.1 °F (36.7 °C), Min:97.2 °F (36.2 °C), Max:98.5 °F (36.9 °C) Lines: R IJ, L femoral sheath Exam:   
 General: NC/AT, awake, mildly agitated on exam, looks stated age, in NAD HEENT: PERRL, non-icteric sclera, no splinter hemorrhages Neck: supple, symmetric, no masses or lymphadenopathy Cardiac:Nl S1/S2, no murmurs/rubs/gallops/clicks, trace LE edema Pulmonary:clear bilaterally ermias/wheezes, good air movement Abdomen: soft, NT, non-distended, BS normal, Gentital:external genitalia normal Wise : none Skin:no rashes, lesions or wounds MSK:no enlarged or swollen joints in limbs or sternoclavicular area Extremities: no cords/clots/cyanosis, pulses palpable and symmetric Psychiatric: mildly agitated. Data Review:  
Recent Results (from the past 24 hour(s)) GLUCOSE, POC Collection Time: 10/01/20  9:08 AM  
Result Value Ref Range Glucose (POC) 373 (H) 65 - 100 mg/dL PROTHROMBIN TIME + INR Collection Time: 10/01/20  9:23 AM  
Result Value Ref Range Prothrombin time 14.4 12.0 - 14.7 sec INR 1.1 GLUCOSE, POC Collection Time: 10/01/20 12:06 PM  
Result Value Ref Range Glucose (POC) 330 (H) 65 - 100 mg/dL EKG, 12 LEAD, INITIAL Collection Time: 10/01/20  2:34 PM  
Result Value Ref Range Ventricular Rate 100 BPM  
 Atrial Rate 100 BPM  
 P-R Interval 140 ms QRS Duration 98 ms Q-T Interval 350 ms QTC Calculation (Bezet) 451 ms Calculated P Axis 50 degrees Calculated R Axis -72 degrees Calculated T Axis 95 degrees Diagnosis Normal sinus rhythm Left anterior fascicular block Anterior infarct , age undetermined Inferior injury pattern 
** ** ACUTE MI / STEMI ** ** Abnormal ECG No previous ECGs available Confirmed by Cresencio Dennis MD (), [facility] ([id number]) on 10/2/2020 7:21:54 AM 
  
GLUCOSE, POC Collection Time: 10/01/20  4:53 PM  
Result Value Ref Range Glucose (POC) 223 (H) 65 - 100 mg/dL GLUCOSE, POC Collection Time: 10/01/20  9:35 PM  
Result Value Ref Range Glucose (POC) 198 (H) 65 - 100 mg/dL GLUCOSE, POC Collection Time: 10/02/20 12:04 AM  
Result Value Ref Range Glucose (POC) 299 (H) 65 - 100 mg/dL GLUCOSE, POC Collection Time: 10/02/20  3:37 AM  
Result Value Ref Range Glucose (POC) 261 (H) 65 - 100 mg/dL CBC WITH AUTOMATED DIFF Collection Time: 10/02/20  3:38 AM  
Result Value Ref Range WBC 20.6 (H) 4.3 - 11.1 K/uL  
 RBC 2.81 (L) 4.23 - 5.6 M/uL HGB 7.9 (L) 13.6 - 17.2 g/dL HCT 24.6 (L) 41.1 - 50.3 % MCV 87.5 79.6 - 97.8 FL  
 MCH 28.1 26.1 - 32.9 PG  
 MCHC 32.1 31.4 - 35.0 g/dL  
 RDW 15.2 (H) 11.9 - 14.6 % PLATELET 878 591 - 682 K/uL MPV 12.2 9.4 - 12.3 FL ABSOLUTE NRBC 0.02 0.0 - 0.2 K/uL  
 DF AUTOMATED NEUTROPHILS 83 (H) 43 - 78 % LYMPHOCYTES 6 (L) 13 - 44 % MONOCYTES 8 4.0 - 12.0 % EOSINOPHILS 2 0.5 - 7.8 % BASOPHILS 0 0.0 - 2.0 % IMMATURE GRANULOCYTES 2 0.0 - 5.0 %  
 ABS. NEUTROPHILS 17.0 (H) 1.7 - 8.2 K/UL  
 ABS. LYMPHOCYTES 1.2 0.5 - 4.6 K/UL  
 ABS. MONOCYTES 1.6 (H) 0.1 - 1.3 K/UL  
 ABS. EOSINOPHILS 0.3 0.0 - 0.8 K/UL  
 ABS. BASOPHILS 0.1 0.0 - 0.2 K/UL  
 ABS. IMM. GRANS. 0.4 0.0 - 0.5 K/UL METABOLIC PANEL, BASIC Collection Time: 10/02/20  3:38 AM  
Result Value Ref Range Sodium 134 (L) 136 - 145 mmol/L Potassium 5.8 (H) 3.5 - 5.1 mmol/L Chloride 98 98 - 107 mmol/L  
 CO2 23 21 - 32 mmol/L Anion gap 13 7 - 16 mmol/L Glucose 292 (H) 65 - 100 mg/dL  (H) 8 - 23 MG/DL Creatinine 6.70 (H) 0.8 - 1.5 MG/DL  
 GFR est AA 10 (L) >60 ml/min/1.73m2 GFR est non-AA 9 (L) >60 ml/min/1.73m2 Calcium 8.2 (L) 8.3 - 10.4 MG/DL PROTHROMBIN TIME + INR  Collection Time: 10/02/20  3:38 AM  
 Result Value Ref Range Prothrombin time 15.7 (H) 12.0 - 14.7 sec INR 1.2 HEPATIC FUNCTION PANEL Collection Time: 10/02/20  3:38 AM  
Result Value Ref Range Protein, total 7.0 6.3 - 8.2 g/dL Albumin 2.0 (L) 3.2 - 4.6 g/dL Globulin 5.0 (H) 2.3 - 3.5 g/dL A-G Ratio 0.4 (L) 1.2 - 3.5 Bilirubin, total 0.9 0.2 - 1.1 MG/DL Bilirubin, direct 0.4 (H) <0.4 MG/DL Alk. phosphatase 178 (H) 50 - 136 U/L  
 AST (SGOT) 87 (H) 15 - 37 U/L  
 ALT (SGPT) 53 12 - 65 U/L  
GLUCOSE, POC Collection Time: 10/02/20  8:42 AM  
Result Value Ref Range Glucose (POC) 342 (H) 65 - 100 mg/dL Microbiology:   
All Micro Results Procedure Component Value Units Date/Time CULTURE, BLOOD [[id number]] Collected:  10/01/20 5855 Order Status:  Completed Specimen:  Blood Updated:  10/01/20 1043 CULTURE, BLOOD [[id number]] Collected:  10/01/20 8315 Order Status:  Completed Specimen:  Blood Updated:  10/01/20 1043 CULTURE, BLOOD [[id number]] Collected:  20 Order Status:  Completed Specimen:  Blood Updated:  20 5962 Special Requests: --     
  LEFT 
HAND Culture result: NO GROWTH 5 DAYS     
 CULTURE, BLOOD [[id number]] Collected:  20 Order Status:  Completed Specimen:  Blood Updated:  20 8559 Special Requests: --     
  RIGHT 
HAND Culture result: NO GROWTH 5 DAYS     
 CULTURE, URINE [[id number]] Collected:  20 1837 Order Status:  Completed Specimen:  Cath Urine Updated:  20 7410 Special Requests: NO SPECIAL REQUESTS Culture result: NO GROWTH 2 DAYS Studies/Imaging:   
Transthoracic Echocardiogram 
2D and Doppler 
  
Patient: Erika Hannah 
MR #: [id number] : [id number] Age: 79 years Gender: Male Study date: 25-Sep-2020 Account #: [de-identified] Height: 66 in 
Weight: 180 lb BSA: 1.91 mï¾² Status:Routine Location: 3109 BP: 119/ 69 
  
Allergies: NO KNOWN ALLERGENS 
  
 Sonographer:  Jordan Henry [facility] Group:  [address] Cardiology Referring Physician:  Abel Davis MD [facility] Reading Physician:  Abel Richey. MD [facility] 
  
INDICATIONS: Cardiogenic shock. 
  
*Impella was present and patient was scanned supine. * 
  
PROCEDURE: This was a routine study. A transthoracic echocardiogram was 
performed. The study included limited 2D imaging and limited spectral  
Doppler. Echocardiographic views were limited by restricted patient mobility and poor 
acoustic window availability. This was a technically difficult study. 
  
LEFT VENTRICLE: No apical clot seen. Systolic function was markedly reduced. Ejection fraction was estimated in the range of 15 % to 20 %. There was  
severe 
diffuse hypokinesis. There was akinesis of the mid-apical anterior, mid 
anteroseptal, midapical inferior, apical septal, apical lateral, and apical 
wall(s). There was a mass on the apicalwall, associated with an akinetic 
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 There was  
akinesis 
of the entire anterior, mid anteroseptal, and apical wall(s). 
  
SUMMARY: 
  
-  Left ventricle: No apical clot seen. Systolic function was markedly  
reduced. Ejection fraction was estimated in the range of 15 % to 20 %. There was  
severe 
diffuse hypokinesis. There was akinesis of the mid-apical anterior, mid 
anteroseptal, midapical inferior, apical septal, apical lateral, and apical 
wall(s). There was a mass on the apicalwall, associated with an akinetic 
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 There was  
akinesis 
of the entire anterior, mid anteroseptal, and apical wall(s). 
  
Prepared and signed by 
Kaya Fernandez. MD [facility] Signed 25-Sep-2020 11:48:50 Final [99]  9/28/2020  05:58  9/28/2020  06:31 Study Result EXAM: TEMPORARY 
  
INDICATION: Heart failure 
  
COMPARISON: 9/27/2020 
  
FINDINGS: A portable AP radiograph of the chest was obtained at 0555 hours. Left 
ventricular assist device in adequate position. Unremarkable dialysis catheter. Duke Martin The lungs are clear. The cardiac and mediastinal contours and pulmonary 
vascularity are normal.  The bones and soft tissues are grossly within normal 
limits.  
  
IMPRESSION IMPRESSION: Normal chest.  
 
 
 
Signed By: Jim Bonilla MD   
 October 2, 2020

## 2020-10-02 NOTE — PROGRESS NOTES
Warfarin dosing per pharmacist 
 
Rodney Kumar is a 79 y.o. male. Height: 5' 6\" (167.6 cm)    Weight: 83 kg (182 lb 15.7 oz)(bed scale not functioning, last filed value) Indication:  Apical thrombus Goal INR:  2.5-3.5 Home dose:  New start Risk factors or significant drug interactions:  -- Other anticoagulants:  -- 
 
Daily Monitoring Date  INR     Warfarin dose HGB              Notes 10/1  1.1  5 mg  8.1  
10/2  1.2  5 mg  7.9 INR 1.2 today. Will continue 5 mg tonight and monitor INR daily. Please call with any questions. Thank you, Tony Harris, PharmD, BCPS Clinical Pharmacist 
[phone]

## 2020-10-02 NOTE — PROGRESS NOTES
Progress Note Patient: Jailene Marcus MRN: [id number]  SSN: [id number] YOB: 1940  Age: 79 y.o. Sex: male Admit Date: 9/22/2020 LOS: 10 days Subjective:  
 
Jailene Marcus is a 79 y.o. male who is being seen for consultation for DM management, MAURICIO and hyperkalemia.  
  
Patient has DM and hypertension. He speaks only [address] and daughter is on the phone helping with communication.  
  
Patient has been admitted on 9/22/2020 due to chest pain. He had STEMI and underwent PTCA.  
  
\" PROCEDURES PERFORMED: 1.  Left heart cath, selective coronary arteriography, and left ventriculogram via the right radial approach. 2.  PCI and stenting of the LAD. 3.  IVUS of LAD after stenting. 4.  Impella CP LVAD placement via the right common femoral artery. 5.  Placement of a continuous cardiac output Clifton-Richard catheter via the left femoral vein. \"  
  
His BS is elevated. His creatinine is up after left heart catheterization and he has hyperkalemia and metabolic acidosis. Nephrology is consulted. Patient has made little urine. He has been on HD for the past 3 days with SLED. Patient tolerated it. Interval History (10/2): patient examined at bedside. Patient with abdominal pain yesterday, was given Mylanta by primary team. Family at bedside, says Shea Aiken is hurting pretty bad. \" Given Tylenol without improvement. KUB unremarkable. Family thinks \"when he gets tube feeds it seems to make it worse. \" Last BM was yesterday. Objective:  
 
Vitals:  
 10/02/20 7351 10/02/20 0829 10/02/20 0830 10/02/20 8125 BP: 102/68 116/75  100/64 Pulse: 97 95 Resp:      
Temp:      
SpO2: 95% 91% 95% 94% Weight:      
Height:      
  
 
Intake and Output: 
Current Shift: 10/02 0701 - 10/02 1900 In: 120 Out: 0 Last three shifts: 09/30 1901 - 10/02 0700 In: 3776.2 [I.V.:1692.2] Out: 1465 [Drains:50] Physical Exam:  
 
General:                    WQTLABA male that is encephalopathic IVQK:                                   [id number]. Eyes:                                   palpebral pallor, no scleral icterus. ENT:                                    External auricular and nasal exam within normal limits.                                             ALVUYM membranes are moist. 
Neck:                                   Supple, non-tender, no JVD. Right IJ catheter is in place Lungs:                       decreased to auscultation bilaterally without wheezes or crackles.                                             No respiratory distress or accessory muscle use. Heart:                                  Regular rate and rhythm, without murmurs, rubs, or gallops. Abdomen:                  soft, exquisite TTP, no fluid wave Genitourinary:           No tenderness over the bladder or bilateral CVAs. Wise's catheter in place. Extremities:               Without clubbing, cyanosis,mild edema. Right groin with catheter in place. Skin:                                    Normal color, texture, and turgor. No rashes, lesions, or jaundice. Pulses:                      Radial and dorsalis pedis pulses present 2+ bilaterally.  
                                            Capillary refill <2s. Neurologic:                moves all four extremities, not awake or oriented Lab/Data Review: 
 
Recent Results (from the past 24 hour(s)) PROTHROMBIN TIME + INR Collection Time: 10/01/20  9:23 AM  
Result Value Ref Range Prothrombin time 14.4 12.0 - 14.7 sec INR 1.1 GLUCOSE, POC Collection Time: 10/01/20 12:06 PM  
Result Value Ref Range Glucose (POC) 330 (H) 65 - 100 mg/dL EKG, 12 LEAD, INITIAL Collection Time: 10/01/20  2:34 PM  
Result Value Ref Range Ventricular Rate 100 BPM  
 Atrial Rate 100 BPM  
 P-R Interval 140 ms QRS Duration 98 ms Q-T Interval 350 ms QTC Calculation (Bezet) 451 ms Calculated P Axis 50 degrees Calculated R Axis -72 degrees Calculated T Axis 95 degrees Diagnosis Normal sinus rhythm Left anterior fascicular block Anterior infarct , age undetermined Inferior injury pattern 
** ** ACUTE MI / STEMI ** ** Abnormal ECG No previous ECGs available Confirmed by Jillian Norwood MD (), Efrain Ortega ([id number]) on 10/2/2020 7:21:54 AM 
  
GLUCOSE, POC Collection Time: 10/01/20  4:53 PM  
Result Value Ref Range Glucose (POC) 223 (H) 65 - 100 mg/dL GLUCOSE, POC Collection Time: 10/01/20  9:35 PM  
Result Value Ref Range Glucose (POC) 198 (H) 65 - 100 mg/dL GLUCOSE, POC Collection Time: 10/02/20 12:04 AM  
Result Value Ref Range Glucose (POC) 299 (H) 65 - 100 mg/dL GLUCOSE, POC Collection Time: 10/02/20  3:37 AM  
Result Value Ref Range Glucose (POC) 261 (H) 65 - 100 mg/dL CBC WITH AUTOMATED DIFF Collection Time: 10/02/20  3:38 AM  
Result Value Ref Range WBC 20.6 (H) 4.3 - 11.1 K/uL  
 RBC 2.81 (L) 4.23 - 5.6 M/uL HGB 7.9 (L) 13.6 - 17.2 g/dL HCT 24.6 (L) 41.1 - 50.3 % MCV 87.5 79.6 - 97.8 FL  
 MCH 28.1 26.1 - 32.9 PG  
 MCHC 32.1 31.4 - 35.0 g/dL  
 RDW 15.2 (H) 11.9 - 14.6 % PLATELET 117 695 - 670 K/uL MPV 12.2 9.4 - 12.3 FL ABSOLUTE NRBC 0.02 0.0 - 0.2 K/uL  
 DF AUTOMATED NEUTROPHILS 83 (H) 43 - 78 % LYMPHOCYTES 6 (L) 13 - 44 % MONOCYTES 8 4.0 - 12.0 % EOSINOPHILS 2 0.5 - 7.8 % BASOPHILS 0 0.0 - 2.0 % IMMATURE GRANULOCYTES 2 0.0 - 5.0 %  
 ABS. NEUTROPHILS 17.0 (H) 1.7 - 8.2 K/UL  
 ABS. LYMPHOCYTES 1.2 0.5 - 4.6 K/UL  
 ABS. MONOCYTES 1.6 (H) 0.1 - 1.3 K/UL  
 ABS. EOSINOPHILS 0.3 0.0 - 0.8 K/UL  
 ABS. BASOPHILS 0.1 0.0 - 0.2 K/UL  
 ABS. IMM. GRANS. 0.4 0.0 - 0.5 K/UL METABOLIC PANEL, BASIC Collection Time: 10/02/20  3:38 AM  
Result Value Ref Range Sodium 134 (L) 136 - 145 mmol/L Potassium 5.8 (H) 3.5 - 5.1 mmol/L Chloride 98 98 - 107 mmol/L  
 CO2 23 21 - 32 mmol/L  Anion gap 13 7 - 16 mmol/L  
 Glucose 292 (H) 65 - 100 mg/dL  (H) 8 - 23 MG/DL Creatinine 6.70 (H) 0.8 - 1.5 MG/DL  
 GFR est AA 10 (L) >60 ml/min/1.73m2 GFR est non-AA 9 (L) >60 ml/min/1.73m2 Calcium 8.2 (L) 8.3 - 10.4 MG/DL PROTHROMBIN TIME + INR Collection Time: 10/02/20  3:38 AM  
Result Value Ref Range Prothrombin time 15.7 (H) 12.0 - 14.7 sec INR 1.2 HEPATIC FUNCTION PANEL Collection Time: 10/02/20  3:38 AM  
Result Value Ref Range Protein, total 7.0 6.3 - 8.2 g/dL Albumin 2.0 (L) 3.2 - 4.6 g/dL Globulin 5.0 (H) 2.3 - 3.5 g/dL A-G Ratio 0.4 (L) 1.2 - 3.5 Bilirubin, total 0.9 0.2 - 1.1 MG/DL Bilirubin, direct 0.4 (H) <0.4 MG/DL Alk. phosphatase 178 (H) 50 - 136 U/L  
 AST (SGOT) 87 (H) 15 - 37 U/L  
 ALT (SGPT) 53 12 - 65 U/L  
GLUCOSE, POC Collection Time: 10/02/20  8:42 AM  
Result Value Ref Range Glucose (POC) 342 (H) 65 - 100 mg/dL XR chest  
9/23/2020 IMPRESSION: 
  
1. Improved aeration of the lungs, consistent with resolving pulmonary edema. XR chest  
9/24/2020 IMPRESSION: 
  
1. Progression of bilateral interstitial densities, likely pulmonary edema. Current Facility-Administered Medications:  
  insulin NPH (NOVOLIN N, HUMULIN N) injection 26 Units, 26 Units, SubCUTAneous, Q12H, Brayan Zendejas DO 
  HYDROmorphone (PF) (DILAUDID) injection 0.5 mg, 0.5 mg, IntraVENous, ONCE, Brayan Zendejas DO 
  warfarin (COUMADIN) tablet 5 mg, 5 mg, Oral, QPM, Michelle Myles MD, 5 mg at 10/01/20 1805   cefepime (MAXIPIME) 2 g in 0.9% sodium chloride (MBP/ADV) 100 mL, 2 g, IntraVENous, Q24H, Nancy Valentine MD, Last Rate: 200 mL/hr at 10/02/20 0829, 2 g at 10/02/20 4096 
  aspirin chewable tablet 81 mg, 81 mg, Per NG tube, DAILY, Karen Rios MD, 81 mg at 10/02/20 0826 
  atorvastatin (LIPITOR) tablet 80 mg, 80 mg, Per NG tube, Michelle Dominguez MD, 80 mg at 10/02/20 3973   Vancomycin intermittent dosing per pharmacy , , Other, Rx Dosing/Monitoring, Sharonda Cam MD 
  famotidine (PEPCID) 40 mg/5 mL (8 mg/mL) oral suspension 20 mg, 20 mg, Oral, BID PRN **OR** famotidine (PF) (PEPCID) 20 mg in 0.9% sodium chloride 10 mL injection, 20 mg, IntraVENous, BID PRN, Jacy Darling NP, 20 mg at 10/01/20 2138   alum-mag hydroxide-simeth (MYLANTA) oral suspension 30 mL, 30 mL, Per NG tube, Q4H PRN, Christine CHOE NP, 30 mL at 10/02/20 0559   insulin regular (NOVOLIN R, HUMULIN R) injection, , SubCUTAneous, Q4H, CizacharycoBrayan DO, 8 Units at 10/02/20 0846 
  heparin (porcine) 1,000 unit/mL injection 5,000 Units, 5,000 Units, Hemodialysis, DIALYSIS PRN, Aparna Ortez MD 
  NOREPINephrine (LEVOPHED) 4 mg in 5% dextrose 250 mL infusion, 0.5-16 mcg/min, IntraVENous, TITRATE, Brunilda Cruz MD, Stopped at 10/01/20 1816 
  dexmedeTOMidine (PRECEDEX) 400 mcg in 0.9% sodium chloride 100 mL infusion, 0.2-0.7 mcg/kg/hr, IntraVENous, TITRATE, Lily Treviño NP, Last Rate: 8.2 mL/hr at 10/02/20 0008, 0.4 mcg/kg/hr at 10/02/20 0008   senna-docusate (PERICOLACE) 8.6-50 mg per tablet 1 Tab, 1 Tab, Per NG tube, DAILY, Brunilda Cruz MD, 1 Tab at 10/01/20 5495   lip protectant (BLISTEX) ointment 1 Each, 1 Each, Topical, PRN, Natali Fletcher MD 
  LORazepam (ATIVAN) injection 1 mg, 1 mg, IntraVENous, Q2H PRN, Leyda Sahu MD, 1 mg at 10/02/20 5928   labetaloL (NORMODYNE;TRANDATE) injection 20 mg, 20 mg, IntraVENous, Q6H PRN, Eva Myles MD 
  sodium chloride (NS) flush 5-40 mL, 5-40 mL, IntraVENous, Q8H, Pete Davis MD, 10 mL at 10/02/20 0559   sodium chloride (NS) flush 5-40 mL, 5-40 mL, IntraVENous, PRN, Pete Davis MD 
  acetaminophen (TYLENOL) tablet 650 mg, 650 mg, Oral, Q4H PRN, Pete Davis MD, 650 mg at 10/02/20 7565 
  nitroglycerin (NITROSTAT) tablet 0.4 mg, 0.4 mg, SubLINGual, Q5MIN PRN, Pete Davis MD 
   ticagrelor (BRILINTA) tablet 90 mg, 90 mg, Oral, Q12H, Stevo Davis MD, 90 mg at 10/02/20 0559   ondansetron (ZOFRAN) injection 4 mg, 4 mg, IntraVENous, Q4H PRN, Stevo Davis MD, 4 mg at 09/23/20 0745 Assessment:  
 
Principal Problem: 
  Acute ST elevation myocardial infarction (STEMI) involving left anterior descending (LAD) coronary artery ([facility] [place] [address] (9/22/2020) Active Problems: Hypertension (9/22/2020) Diabetes mellitus type 2, controlled ([facility] [place] [address] (9/22/2020) Acute renal failure (ARF) ([facility] [place] [address] (9/23/2020) Cardiogenic shock ([facility] [place] [address] (9/25/2020) Delirium (9/25/2020) NSVT (nonsustained ventricular tachycardia) ([facility] [place] [address] (9/25/2020) LV (left ventricular) mural thrombus following MI ([facility] [place] [address] (10/1/2020) Metabolic encephalopathy (91/7/6647) Plan:  
 
# STEMI s/p PTCA now complicated by cardiogenic shock on Levophed gtt 
- cardiology managing - Impella discontinued 
- weaned off Levophed gtt # Abdominal pain, started late yesterday - Mylanta and tylenol have not helped - KUB unremarkable - check LA 
- ordered CT abd/pelvis without contrast (anuric MAURICIO limits use of IV contrast) 
- general surgery consult 
- add pantoprazole IV for GI stress prophylaxis  
- supportive management # Cardiogenic shock, ?septic shock (less likely) 
- weaned off Levophed gtt 
- blood/urine cultures with NGTD 
- continue empiric antibiotics for now 
- will repeat blood cultures and consult ID for any further recs 
- wean pressors as tolerated # Worsening confusion/upper extremity weakness with concern for CVA 
- known LV apical thrombus, heparin held with discontinuation of Impella - CT head negative - MRI head pending 
- neurology following, appreciate recs 
- anticoagulation per neurology and cardiology 
- EEG nonspecific 
- has been on Precedex gtt for confusion/agitation 
- avoid sedative/hypnotics and narcotics # Ischemic cardiomyopathy - continue with ASA and Brilinta 
- management as above # Anuric MAURICIO, likely due to combination of IV contrast and cardiac ischemia 
- nephrology following, on dialysis - strict I's/O's and daily weights 
- avoid NSAIDs, IV contrast, and nephrotoxic meds 
- serial BMPs # DM type II 
- increase NPH from 22 units to 26 units SQ q12h 
- switch from Humalog to regular insulin SSI 
- serial CBGs between 140-180 
- patient on continuous tube feeds Ppx: SCDs for VTE Thank you for this consult. Hospitalist will continue to follow along. Please page/call with questions or concerns. Signed By: Jakub Lazar, DO October 2, 2020

## 2020-10-02 NOTE — DIABETES MGMT
Patient admitted with STEMI. Admitting blood glucose 254. HbA1c 8.0 (). Blood glucose ranged 198-373 yesterday with patient receiving NPH 44 units and Humalog 48 units. Blood glucose this morning was 342. Reviewed patient current regimen: NPH 26 units BID and Regular SSI q4h. Spoke with primary RN, patient receiving continual tube feedings and currently on Precedex gtt. Updated provider via "[employer] regarding patient glycemic control. Patient would likely benefit from an increase in basal insulin and a change to Lantus for smoother glycemic control. Patient would also likely benefit from a change to resistant sliding scale to increase amount of bolus insulin patient receives.

## 2020-10-02 NOTE — PROGRESS NOTES
[facility] CARDIOLOGY PROGRESS NOTE 
 
10/2/2020 7:48 AM 
 
Admit Date: 9/22/2020 Subjective:  
Stable overnight but mental status is worse. Labs with elevated WBC and Hgb dropping. Levophed requirements up over night. Objective:  
  
Vitals:  
 10/02/20 1549 10/02/20 1559 10/02/20 1602 10/02/20 1609 BP: (!) 96/59 (!) 91/55 (!) (P) 91/55 (!) 105/54 Pulse: 90 90 (P) 91 88 Resp: (!) 32 8  (!) 32 Temp:      
SpO2: 94% 97%  94% Weight:      
Height:      
 
 
Physical Exam: 
Neck- supple, difficult to assess JVD at 60 deg CV- regular rate and rhythm no MRG Lung- clear bilaterally anteriorly, dull/decreased lateral bases Abd- soft, nontender, nondistended Ext- no edema distally, groin sites CDI, left groin venous sheath CDI Skin- warm and dry Data Review:  
Recent Labs 10/02/20 
5079 10/01/20 
8910 10/01/20 
0345 *  --  137  
K 5.8*  --  4.8 *  --  47* CREA 6.70*  --  3.44* *  --  274* WBC 20.6*  --  16.3* HGB 7.9*  --  8.1* HCT 24.6*  --  24.5*  
  --  180 INR 1.2 1.1  -- Assessment and Plan:  
 
 Principal Problem: 
  Acute ST elevation myocardial infarction (STEMI) involving left anterior descending (LAD) coronary artery ([facility] [address] (9/22/2020) S/P PCI of prox to mid LAD. On ASA and Brilinta. EF 10-15%. Prognosis is very poor and chances of meaningful recovery are minimal.  Family is educated about prognosis and will to have family meeting to reassess level of care. I recommend palliative care and DNR. Active Problems: 
 
  Acute cystitis without hematuria (9/22/2020) Treated, monitor clinically. Hypertension (9/22/2020) Now hypotensive and on Levophed 
  
  Diabetes mellitus type 2, controlled ([address] (9/22/2020) Continue insulin per ICU protocol 
  
  Acute renal failure (ARF) ([address] (9/23/2020) Appreciate nephrology assistance. Continue dialysis per renal direction 
  
  Cardiogenic shock ([facility] (9/25/2020) Continue to monitor, off all CHF meds 
  
  Delirium (9/25/2020) Complicates management. intermittent/waxing/waning. In mittens today.  
  
  NSVT (nonsustained ventricular tachycardia) ([address] (9/25/2020) Occurred in cath lab. No recurrence. Stopped amiodarone without recurrence, follow tele.  
  
Apical thrombus Seen on prior echo. On dialysis now. Started coumadin tonight, target INR 2.5-3.5 with apical thrombus. Stop ASA when INR >2.   Bridge with heparin until INR > 2.0 
  
Petra Cooper MD

## 2020-10-02 NOTE — PROGRESS NOTES
[facility] NEPHROLOGY PROGRESS NOTE Follow up for: MAURICIO 
 
 
ROS:  UTO Objective:  
Exam: 
Vitals:  
 10/02/20 1214 10/02/20 1230 10/02/20 1314 10/02/20 1413 BP: (!) 83/50 (!) 86/53 (!) 83/51 (!) 80/50 Pulse: 91 91 92 87 Resp: (!) 34  24 Temp:      
SpO2: 92% 94% 93% Weight:      
Height:      
 
 
 
Intake/Output Summary (Last 24 hours) at 10/2/2020 1506 Last data filed at 10/2/2020 1413 Gross per 24 hour Intake 2585.51 ml Output 266 ml Net 2319.51 ml  
 
 
Current Facility-Administered Medications Medication Dose Route Frequency  famotidine (PF) (PEPCID) 20 mg in 0.9% sodium chloride 10 mL injection  20 mg IntraVENous DAILY  insulin glargine (LANTUS) injection 35 Units  35 Units SubCUTAneous BID  insulin lispro (HUMALOG) injection   SubCUTAneous Q6H  
 HYDROmorphone (PF) (DILAUDID) injection 1 mg  1 mg IntraVENous Q2H PRN Or  
 HYDROmorphone (PF) (DILAUDID) injection 2 mg  2 mg IntraVENous Q2H PRN  
 warfarin (COUMADIN) tablet 5 mg  5 mg Oral QPM  
 cefepime (MAXIPIME) 2 g in 0.9% sodium chloride (MBP/ADV) 100 mL  2 g IntraVENous Q24H  
 aspirin chewable tablet 81 mg  81 mg Per NG tube DAILY  atorvastatin (LIPITOR) tablet 80 mg  80 mg Per NG tube DAILY  Vancomycin intermittent dosing per pharmacy    Other Rx Dosing/Monitoring  alum-mag hydroxide-simeth (MYLANTA) oral suspension 30 mL  30 mL Per NG tube Q4H PRN  
 heparin (porcine) 1,000 unit/mL injection 5,000 Units  5,000 Units Hemodialysis DIALYSIS PRN  
 NOREPINephrine (LEVOPHED) 4 mg in 5% dextrose 250 mL infusion  0.5-16 mcg/min IntraVENous TITRATE  dexmedeTOMidine (PRECEDEX) 400 mcg in 0.9% sodium chloride 100 mL infusion  0.2-0.7 mcg/kg/hr IntraVENous TITRATE  senna-docusate (PERICOLACE) 8.6-50 mg per tablet 1 Tab  1 Tab Per NG tube DAILY  lip protectant (BLISTEX) ointment 1 Each  1 Each Topical PRN  
 LORazepam (ATIVAN) injection 1 mg  1 mg IntraVENous Q2H PRN  
  labetaloL (NORMODYNE;TRANDATE) injection 20 mg  20 mg IntraVENous Q6H PRN  
 sodium chloride (NS) flush 5-40 mL  5-40 mL IntraVENous Q8H  
 sodium chloride (NS) flush 5-40 mL  5-40 mL IntraVENous PRN  
 acetaminophen (TYLENOL) tablet 650 mg  650 mg Oral Q4H PRN  
 nitroglycerin (NITROSTAT) tablet 0.4 mg  0.4 mg SubLINGual Q5MIN PRN  
 ticagrelor (BRILINTA) tablet 90 mg  90 mg Oral Q12H  
 ondansetron (ZOFRAN) injection 4 mg  4 mg IntraVENous Q4H PRN  
 
 
EXAM 
GEN - just moaning CV - S1, S2, RRR, no rub, murmur, or gallop Lung - CTA Abd - soft, nontender, BS present Ext - no edema Recent Labs 10/02/20 
5452 10/01/20 
0345 09/30/20 
6702 WBC 20.6* 16.3* 15.0* HGB 7.9* 8.1* 8.0*  
HCT 24.6* 24.5* 24.4*  
 180 148* Recent Labs 10/02/20 
0736 10/01/20 
0345 09/30/20 
3015 * 137 135*  
K 5.8* 4.8 4.8  
CL 98 100 99 CO2 23 28 25 * 47* 112* CREA 6.70* 3.44* 6.98* CA 8.2* 8.4 8.4 * 274* 254* Assessment and Plan:  
1) MAURICIO-  Admission Cr 1.4. MAURICIO likely ATN from ischemic injury and contrast.  Remains oliguric. 
- SED for clearance and UF 
- Probably a shorter SED tomorrow as he appears catabolic so would like to dialyze him at least once over the weekend 2) Hyperkalemia - resolved 3) Lactic Acidosis- resolved 4) STEMI- s/p LHC. Impella removed 5) Pulmonary edema- better 6) Hypotension - Levophed Machelle Thomas MD

## 2020-10-02 NOTE — PROGRESS NOTES
SPEECH PATHOLOGY NOTE: 
 
Speech therapy attempt this PM; however RN requesting to hold as patient is not appropriate. Will continue to follow.   
 
[address] CCC-SLP

## 2020-10-02 NOTE — PROGRESS NOTES
Palliative Care Progress Note Patient: Ines Salcido MRN: [id number]  SSN: [id number] YOB: 1940  Age: 79 y.o. Sex: male Assessment/Plan: Chief Complaint/Interval History: Abdominal pain that started yesterday has gotten worse. CT ordered and General Surgery consulted by Dr Carlos Enrique Porter. 
  
Principal Diagnosis:   
· Pain, abdomen  R10.9 Additional Diagnoses: · Dyspnea  R06.00 · Dysphagia  R13.10 · Encephalopathy, Unspecified  G93.40 · Frailty  R54 
· Encounter for Palliative Care  Z51.5 Palliative Performance Scale (PPS) PPS: 20 Medical Decision Making:  
Reviewed and summarized notes from last 24 hours. Discussed case with appropriate providers: ICU IDT rounds, RN Rivka Ramirez Reviewed lab and X-ray data from last 24 hours. The pt is resting in bed, wife and dtr Ofe at bedside. The abdominal pain that Mr Carmell Bloch began experiencing yesterday has continued to worsen; unalleviated by pantroprazole; KUB unremarkable. Mr Carmell Bloch is moaning in pain; added IV Dilaudid which is helping somewhat. CT ordered and General Surgery consulted by Dr Carlos Enrique Porter. 
 
The pt's wife states that she wants the pt to be full code for now. Dtr Sagar Farias said the her mother wants to give the pt every chance. Mrs Carmell Bloch said that she believes God will save her . Code status changed to full code. Will continue to follow. Will discuss findings with members of the interdisciplinary team.   
 
  
More than 50% of this 35 minute visit was spent counseling and coordination of care as outlined above. Subjective:  
 
Review of Systems: 
Review of systems not obtained due to patient factors: pt minimally responsive, in great pain. Objective:  
 
Visit Vitals BP (!) 90/54 Pulse 83 Temp 98.5 °F (36.9 °C) Resp (!) 42 Ht 5' 6\" (1.676 m) Wt 182 lb 15.7 oz (83 kg) SpO2 92% BMI 29.53 kg/m² Physical Exam: 
 
General:  Cooperative. In accute distress- abdominal pain Eyes:  Conjunctivae/corneas clear Nose: Nares normal. Septum midline. Neck: Supple, symmetrical, trachea midline, no JVD Lungs:   Clear to auscultation bilaterally, somewhat labored due to pain Heart:  Regular rate and rhythm, no murmur Abdomen:   Soft, tender, non-distended Extremities: Normal, atraumatic, no cyanosis or edema Skin: Skin color, texture, turgor normal. No rash or lesions. Neurologic: Limited responsiveness Psych: Alert, in pain Signed By: Lanny Baker NP October 2, 2020

## 2020-10-02 NOTE — PROGRESS NOTES
[facility] staff  available over the phone from 3:30 p.m. - midnight. Please call Kayla at [phone] with any interpreting requests. Gucci Andujar CERTIFIED HEALTHCARE INTERPRETERTM Language Services Department 
[address] [address] [place] [address] 
[phone] (phone)

## 2020-10-02 NOTE — PROGRESS NOTES
Physical Therapy Note: 
 
Participated in interdisciplinary rounds in ICU/CCU and chart reviewed. Patient is experiencing decrease in function from baseline. Patient would benefit from skilled acute therapy to increase independence with self care/ADLs, strength, endurance, and functional mobility. Recommend PT/OT consult when medically stable and MD agrees. Thank you for your consideration, Carlos A Richard, PT, DPT

## 2020-10-02 NOTE — PROGRESS NOTES
[facility] Report given to Bianka Cox RN. Vital signs stable. No complaints present. Patient in stable condition.

## 2020-10-02 NOTE — PROGRESS NOTES
Neurology Daily Progress Note Assessment:  
 
79year old male admitted for STEMI s/p PCI with persistent encephalopathy. He has an apical thromus on echo, currently being treated with warfarin and DAPT. Continue aspirin, Brillinta, warfarin per cardiology. The patient has decreased movement on the left side, concern for stroke. EEG showed diffuse slowingconsistent with metabolic disturbances, negative for seizure. MRI of brain pending. INR 1.2 this AM, would consider resuming heparin drip to get theraputic INR. Plan: MRI brain Continue aspirin, Brillinta, warfarin per cardiology. INR 1.2 this AM, would consider resuming heparin drip to get theraputic INR. Subjective: Interval history: 
 
Patient is able to follow commands. Able to move all extremities to command R>L. Endorses abdominal pain this AM.   Family updated on plan of care at bedside. EEG showed diffuse slowing consistent with metabolic disturbances, negative for seizure. MRI of brain pending. INR 1.2 this AM, would consider resuming heparin drip to get theraputic INR. History: 
 
Ryan Dodd is a 79 y.o. male who is being seen for AMS. Review of systems negative with exception of pertinent positives and negatives noted above. Objective:  
 
Vitals:  
 10/02/20 0945 10/02/20 0959 10/02/20 1014 10/02/20 1029 BP: (!) 88/62 (!) 88/54 (!) 84/52 (!) 90/54 Pulse: 93 92 90 83 Resp: (!) 41 (!) 39 (!) 42 Temp:      
SpO2: 95% 95% 92% 92% Weight:      
Height:      
  
 
 
Current Facility-Administered Medications:  
  famotidine (PF) (PEPCID) 20 mg in 0.9% sodium chloride 10 mL injection, 20 mg, IntraVENous, DAILY, Celestino Mukherjee MD, 20 mg at 10/02/20 1007   insulin glargine (LANTUS) injection 35 Units, 35 Units, SubCUTAneous, BID, CiescoBrayan, DO 
  insulin lispro (HUMALOG) injection, , SubCUTAneous, Q6H, CiescoBrayan, DO 
  HYDROmorphone (PF) (DILAUDID) injection 1 mg, 1 mg, IntraVENous, Q2H PRN, 1 mg at 10/02/20 1024 **OR** HYDROmorphone (PF) (DILAUDID) injection 2 mg, 2 mg, IntraVENous, Q2H PRN, Jacy Darling NP 
  warfarin (COUMADIN) tablet 5 mg, 5 mg, Oral, QPM, Catia Myles MD, 5 mg at 10/01/20 1805   cefepime (MAXIPIME) 2 g in 0.9% sodium chloride (MBP/ADV) 100 mL, 2 g, IntraVENous, Q24H, Britta Ramos MD, Last Rate: 200 mL/hr at 10/02/20 0829, 2 g at 10/02/20 2861 
  aspirin chewable tablet 81 mg, 81 mg, Per NG tube, DAILY, Ronald Jones MD, 81 mg at 10/02/20 0826 
  atorvastatin (LIPITOR) tablet 80 mg, 80 mg, Per NG tube, Catia Vicente MD, 80 mg at 10/02/20 4839   Vancomycin intermittent dosing per pharmacy , , Other, Rx Dosing/Monitoring, Ny Perales MD 
  [facility]) oral suspension 30 mL, 30 mL, Per NG tube, Q4H PRN, Baltazar CHOE NP, 30 mL at 10/02/20 0559   heparin (porcine) 1,000 unit/mL injection 5,000 Units, 5,000 Units, Hemodialysis, DIALYSIS PRN, Ana Moreira MD 
  NOREPINephrine (LEVOPHED) 4 mg in 5% dextrose 250 mL infusion, 0.5-16 mcg/min, IntraVENous, TITRATE, Pilar Torres MD, Stopped at 10/01/20 1816 
  dexmedeTOMidine (PRECEDEX) 400 mcg in 0.9% sodium chloride 100 mL infusion, 0.2-0.7 mcg/kg/hr, IntraVENous, TITRATE, Lily Castro NP, Last Rate: 8.2 mL/hr at 10/02/20 0008, 0.4 mcg/kg/hr at 10/02/20 0008   senna-docusate (PERICOLACE) 8.6-50 mg per tablet 1 Tab, 1 Tab, Per NG tube, DAILY, Pilar Torres MD, 1 Tab at 10/02/20 9821   lip protectant (BLISTEX) ointment 1 Each, 1 Each, Topical, PRN, Celso Breen MD 
  LORazepam (ATIVAN) injection 1 mg, 1 mg, IntraVENous, Q2H PRN, Ronald Jones MD, 1 mg at 10/02/20 9657   labetaloL (NORMODYNE;TRANDATE) injection 20 mg, 20 mg, IntraVENous, Q6H PRN, Catia Myles MD 
  sodium chloride (NS) flush 5-40 mL, 5-40 mL, IntraVENous, Q8H, Sigrid Davis MD, 10 mL at 10/02/20 0542   sodium chloride (NS) flush 5-40 mL, 5-40 mL, IntraVENous, PRN, Pete Davis MD 
  acetaminophen (TYLENOL) tablet 650 mg, 650 mg, Oral, Q4H PRN, Pete Davis MD, 650 mg at 10/02/20 3842 
  nitroglycerin (NITROSTAT) tablet 0.4 mg, 0.4 mg, SubLINGual, Q5MIN PRN, Pete Davis MD 
  [facility]) tablet 90 mg, 90 mg, Oral, Q12H, Pete Davis MD, 90 mg at 10/02/20 0559   ondansetron (ZOFRAN) injection 4 mg, 4 mg, IntraVENous, Q4H PRN, Pete Davis MD, 4 mg at 09/23/20 0745 Recent Results (from the past 12 hour(s)) GLUCOSE, POC Collection Time: 10/02/20 12:04 AM  
Result Value Ref Range Glucose (POC) 299 (H) 65 - 100 mg/dL GLUCOSE, POC Collection Time: 10/02/20  3:37 AM  
Result Value Ref Range Glucose (POC) 261 (H) 65 - 100 mg/dL CBC WITH AUTOMATED DIFF Collection Time: 10/02/20  3:38 AM  
Result Value Ref Range WBC 20.6 (H) 4.3 - 11.1 K/uL  
 RBC 2.81 (L) 4.23 - 5.6 M/uL HGB 7.9 (L) 13.6 - 17.2 g/dL HCT 24.6 (L) 41.1 - 50.3 % MCV 87.5 79.6 - 97.8 FL  
 MCH 28.1 26.1 - 32.9 PG  
 MCHC 32.1 31.4 - 35.0 g/dL  
 RDW 15.2 (H) 11.9 - 14.6 % PLATELET 150 682 - 653 K/uL MPV 12.2 9.4 - 12.3 FL ABSOLUTE NRBC 0.02 0.0 - 0.2 K/uL  
 DF AUTOMATED NEUTROPHILS 83 (H) 43 - 78 % LYMPHOCYTES 6 (L) 13 - 44 % MONOCYTES 8 4.0 - 12.0 % EOSINOPHILS 2 0.5 - 7.8 % BASOPHILS 0 0.0 - 2.0 % IMMATURE GRANULOCYTES 2 0.0 - 5.0 %  
 ABS. NEUTROPHILS 17.0 (H) 1.7 - 8.2 K/UL  
 ABS. LYMPHOCYTES 1.2 0.5 - 4.6 K/UL  
 ABS. MONOCYTES 1.6 (H) 0.1 - 1.3 K/UL  
 ABS. EOSINOPHILS 0.3 0.0 - 0.8 K/UL  
 ABS. BASOPHILS 0.1 0.0 - 0.2 K/UL  
 ABS. IMM. GRANS. 0.4 0.0 - 0.5 K/UL METABOLIC PANEL, BASIC Collection Time: 10/02/20  3:38 AM  
Result Value Ref Range Sodium 134 (L) 136 - 145 mmol/L Potassium 5.8 (H) 3.5 - 5.1 mmol/L Chloride 98 98 - 107 mmol/L  
 CO2 23 21 - 32 mmol/L  Anion gap 13 7 - 16 mmol/L  
 Glucose 292 (H) 65 - 100 mg/dL  (H) 8 - 23 MG/DL Creatinine 6.70 (H) 0.8 - 1.5 MG/DL  
 GFR est AA 10 (L) >60 ml/min/1.73m2 GFR est non-AA 9 (L) >60 ml/min/1.73m2 Calcium 8.2 (L) 8.3 - 10.4 MG/DL PROTHROMBIN TIME + INR Collection Time: 10/02/20  3:38 AM  
Result Value Ref Range Prothrombin time 15.7 (H) 12.0 - 14.7 sec INR 1.2 HEPATIC FUNCTION PANEL Collection Time: 10/02/20  3:38 AM  
Result Value Ref Range Protein, total 7.0 6.3 - 8.2 g/dL Albumin 2.0 (L) 3.2 - 4.6 g/dL Globulin 5.0 (H) 2.3 - 3.5 g/dL A-G Ratio 0.4 (L) 1.2 - 3.5 Bilirubin, total 0.9 0.2 - 1.1 MG/DL Bilirubin, direct 0.4 (H) <0.4 MG/DL Alk. phosphatase 178 (H) 50 - 136 U/L  
 AST (SGOT) 87 (H) 15 - 37 U/L  
 ALT (SGPT) 53 12 - 65 U/L  
GLUCOSE, POC Collection Time: 10/02/20  8:42 AM  
Result Value Ref Range Glucose (POC) 342 (H) 65 - 100 mg/dL CT Results (most recent): 
Results from [facility] Encounter encounter on 09/22/20 CT HEAD WO CONT Narrative CT HEAD WITHOUT CONTRAST HISTORY:  CVA, left hemiplegia. Derril Depew COMPARISON: None. TECHNIQUE: Axial imaging was performed without intravenous contrast utilizing 5mm slice thickness. Sagittal and coronal reformats were performed. Radiation 
dose reduction techniques were used for this study. Our CT scanner uses one or 
all of the following: Automated exposure control, adjustment of the MAS or KUB according to patient's 
size and iterative reconstruction. FINDINGS:     
 
*BRAIN:  
   -  There are no early signs of territorial or lacunar infarction by CT. 
   -  Symmetric supratentorial atrophy. -  For patient's age, the scattered areas of white matter hypodensities may 
represent a chronic small vessel white matter ischemia. However this is 
nonspecific. *VISUALIZED PARANASAL SINUSES: Well aerated. *MASTOIDS:  Clear. *CALVARIUM AND SCALP: Unremarkable.  
 
  
 Impression IMPRESSION: 
 
 No evidence of acute intracranial abnormalities. Date of Dictation: 10/1/2020 1:38 AM 
  
 
Echo Results  (Last 48 hours) None Physical Exam: 
General - Well developed, well nourished, in distress. Moaning and groaning in pain. HEENT - Normocephalic, atraumatic. Conjunctiva are clear. Neck - Supple without masses Extremities - Peripheral pulses intact. No edema and no rashes. Neurological examination - Comprehension, attention, memory and reasoning are impaired. Unable to assess language or speech. On cranial nerve examination, (II, III, IV, VI) pupils are equal, round, and reactive to light. Unable to assess visual acuity or visual fields. Extraocular motility is normal. (V, VII) Face is symmetric(VIII) Hearing is intact. Motor examination - There is normal muscle tone and bulk. Strength is 4/5 in RUE, RLE, 1/5 in LUE. 3/5 in LLE. Muscle stretch reflexes are 2+ in BUE, 1+ BLE. Unable to assess sensation, cerebellar function, or gait. Signed By: Caroline Luz NP October 2, 2020

## 2020-10-02 NOTE — ROUTINE PROCESS
Language services available over the phone. Please call [phone]. Shelly Joya [facility] [id number]  Radha Azima Language Services Department 
[place] [address] [place] [address] 
[phone] (phone)

## 2020-10-02 NOTE — PROGRESS NOTES
rounded on patient with nurse. Interpreting services offered when needed. Juan Adams [facility] INTERPRETERTM Language Services Department 
[address]  [facility] 
[phone] (phone)

## 2020-10-02 NOTE — CONSULTS
H&P/Consult Note/Progress Note/Office Note:  
Rodney Kumar  MRN: [id number]  :1940  OUX:53 y.o. 
 
HPI: Rodney Kumar is a 79 y.o. male who was admitted by cardiology after originally presenting to the [facility] ER on 20 with 8/10 chest pain, sign ST elevation, and STEMI. He had heart cath stents places on anticoagulation with an LV thrombus. Developed hypotension on pressures, EF 10-15%, with no urine output in at least 3 days, and on dialysis with severe mental status changes. He is unable to communicate with me in any way or give any history. He is unresponsive to voice. Her reports of diffuse abdominal pain which began on 10/1/2020. The pain is described as severe by the critical care physician. It does not appear that anything in particular has made the pain better or worse. He has an associated leukocytosis. There is a mild lactic acidosis. I am unable to communicate with the patient at this time. I reviewed the records. I spoke to family members. On 10/2/20 labs showed WBC 20.6 Lipase elevated at >2400 Lactic acid 2.2 He had an unremarkable abdominal x-ray and a CT scan as shown below which identified a distended gallbladder and no pancreatic mass. There is no free air, obstruction, abscess, or hematoma. . 
 
 
10/2/20 abd Xray Weighted tip enteric tube with tip in the gastric body. Nonobstructive bowel gas pattern. No portal venous air or gross pneumoperitoneum on supine films. Pelvic phleboliths evident. 
  
IMPRESSION: 
1. Non-obstructive bowel gas pattern. 10/2/20 CT abd/pelvis without contrast 
Hx: Diffuse generalized abdominal pain. LOWER THORAX: Moderate bilateral pleural effusions with bibasilar patchy 
airspace opacities and atelectatic change. Moderate pericardial effusion which 
is partially imaged.  Mass effect upon the heart is 
  
LIVER: Unenhanced liver is unremarkable. 
  
 BILIARY SYSTEM: Distended/hydropic gallbladder with vicarious excretion of contrast within the gallbladder. 
  
SPLEEN: No splenomegaly. PANCREAS: No pancreatic mass. No pancreatic duct dilation. ADRENALS: No adrenal nodule or adrenal hypertrophy. 
  
URINARY SYSTEM: Small amount of air within the bladder. No exophytic renal lesion. No hydronephrosis or nephrolithiasis. 
  
FLUID:  No free fluid. 
  
REPRODUCTIVE ORGANS: Unremarkable. 
  
BOWEL: Rectal catheter evident. Extensive sigmoid and descending colonic 
diverticulosis without diverticulitis. No evidence of bowel obstruction. Appendix is normal in appearance. Enteric tube coiled in the stomach with tip in 
the gastric fundus. There is a slight right inguinal small bowel containing hernia. 
  
VASCULAR/NODES: No aortic or iliac artery aneurysm. No lymphadenopathy. 
  
BONES/SUBCUTANEOUS TISSUE: No aggressive osseous lesion. No subcutaneous soft tissue abnormality. 
  
 IMPRESSION: 
1. Moderate bilateral pleural effusions and patchy bibasilar airspace opacities suspicious for pneumonia. 2. Moderate pericardial effusion which is partially imaged without evidence of mass effect upon the heart. 3. Distended/hydropic gallbladder with vicarious excretion of contrast. 
4. Small amount of air within the bladder. 5. Enteric tube coiled in the body of the stomach with tip in the gastric fundus. 
  
 
 
 
Past Medical History:  
Diagnosis Date  Diabetes ([facility]  Hypertension No past surgical history on file. Current Facility-Administered Medications Medication Dose Route Frequency  famotidine (PF) (PEPCID) 20 mg in 0.9% sodium chloride 10 mL injection  20 mg IntraVENous DAILY  insulin glargine (LANTUS) injection 35 Units  35 Units SubCUTAneous BID  insulin lispro (HUMALOG) injection   SubCUTAneous Q6H  
 HYDROmorphone (PF) (DILAUDID) injection 1 mg  1 mg IntraVENous Q2H PRN  Or  
  HYDROmorphone (PF) (DILAUDID) injection 2 mg  2 mg IntraVENous Q2H PRN  
 DOBUTamine (DOBUTREX) 500 mg/250 mL (2,000 mcg/mL) infusion  2.5 mcg/kg/min IntraVENous TITRATE  heparin 25,000 units in dextrose 500 mL infusion  12-25 Units/kg/hr IntraVENous TITRATE  warfarin (COUMADIN) tablet 5 mg  5 mg Oral QPM  
 cefepime (MAXIPIME) 2 g in 0.9% sodium chloride (MBP/ADV) 100 mL  2 g IntraVENous Q24H  
 aspirin chewable tablet 81 mg  81 mg Per NG tube DAILY  atorvastatin (LIPITOR) tablet 80 mg  80 mg Per NG tube DAILY  Vancomycin intermittent dosing per pharmacy    Other Rx Dosing/Monitoring  alum-mag hydroxide-simeth (MYLANTA) oral suspension 30 mL  30 mL Per NG tube Q4H PRN  
 heparin (porcine) 1,000 unit/mL injection 5,000 Units  5,000 Units Hemodialysis DIALYSIS PRN  
 NOREPINephrine (LEVOPHED) 4 mg in 5% dextrose 250 mL infusion  0.5-16 mcg/min IntraVENous TITRATE  dexmedeTOMidine (PRECEDEX) 400 mcg in 0.9% sodium chloride 100 mL infusion  0.2-0.7 mcg/kg/hr IntraVENous TITRATE  senna-docusate (PERICOLACE) 8.6-50 mg per tablet 1 Tab  1 Tab Per NG tube DAILY  lip protectant (BLISTEX) ointment 1 Each  1 Each Topical PRN  
 LORazepam (ATIVAN) injection 1 mg  1 mg IntraVENous Q2H PRN  
 labetaloL (NORMODYNE;TRANDATE) injection 20 mg  20 mg IntraVENous Q6H PRN  
 sodium chloride (NS) flush 5-40 mL  5-40 mL IntraVENous Q8H  
 sodium chloride (NS) flush 5-40 mL  5-40 mL IntraVENous PRN  
 acetaminophen (TYLENOL) tablet 650 mg  650 mg Oral Q4H PRN  
 nitroglycerin (NITROSTAT) tablet 0.4 mg  0.4 mg SubLINGual Q5MIN PRN  
 ticagrelor (BRILINTA) tablet 90 mg  90 mg Oral Q12H  
 ondansetron (ZOFRAN) injection 4 mg  4 mg IntraVENous Q4H PRN Patient has no known allergies. Social History Socioeconomic History  Marital status:  Spouse name: Not on file  Number of children: Not on file  Years of education: Not on file  Highest education level: Not on file Tobacco Use  Smoking status: Never Smoker  Smokeless tobacco: Never Used Social History Tobacco Use Smoking Status Never Smoker Smokeless Tobacco Never Used No family history on file. ROS: The patient has no difficulty with chest pain or shortness of breath. No fever or chills. Comprehensive review of systems was otherwise unremarkable except as noted above. Physical Exam:  
Visit Vitals /67 Pulse 91 Temp 97 °F (36.1 °C) Resp (!) 33 Ht 5' 6\" (1.676 m) Wt 182 lb 15.7 oz (83 kg) Comment: bed scale not functioning, last filed value SpO2 97% BMI 29.53 kg/m² Vitals:  
 10/02/20 1820 10/02/20 1830 10/02/20 1839 10/02/20 1849 BP: (!) 133/56 108/66 126/64 130/67 Pulse: 94 94 93 91 Resp: 11 (!) 32 9 (!) 33 Temp:      
SpO2: 98% 98% 99% 97% Weight:      
Height:      
 
No intake/output data recorded. 10/01 0701 - 10/02 1900 In: 3075.5 [I.V.:1258.5] Out: 1288 [Drains:50] Constitutional: lethargic and unarousable; no response to voice Eyes: closed ENMT: no external lesions; no obvious neck masses, no ear or lip lesions, nares normal 
CV: RRR. Resp: No JVD. Breathing is labored GI: non-distended; No signs of tenderness in any quadrant; no palpable mass Musculoskeletal: unremarkable. No embolic signs or cyanosis. Neuro:  comatose Psychiatric: no affect Recent vitals (if inpt): 
Patient Vitals for the past 24 hrs: 
 BP Temp Pulse Resp SpO2  
10/02/20 1849 130/67  91 (!) 33 97 % 10/02/20 1839 126/64  93 9 99 % 10/02/20 1830 108/66  94 (!) 32 98 % 10/02/20 1820 (!) 133/56  94 11 98 % 10/02/20 1810 (!) 107/52  89 28 95 % 10/02/20 1759 (!) 110/59  94 29 98 % 10/02/20 1749 (!) 111/51  92 12 98 % 10/02/20 1619 (!) 96/54  90 12 97 % 10/02/20 1609 (!) 105/54  88 (!) 32 94 % 10/02/20 1602 (!) 91/55 97 °F (36.1 °C) 91 12 98 % 10/02/20 1559 (!) 91/55  90 8 97 % 10/02/20 1549 (!) 96/59  90 (!) 32 94 % 10/02/20 1539 (!) 97/55  91 21 94 % 10/02/20 1536 (!) 90/53  92 12 97 % 10/02/20 1519 (!) 90/53  90 12 98 % 10/02/20 1509 (!) 83/52  88 17 97 % 10/02/20 1459 (!) 80/50  90 10 98 % 10/02/20 1449 (!) 83/51  91 29 95 % 10/02/20 1439 (!) 98/52  91 8 96 % 10/02/20 1419 (!) 86/54  91 (!) 31 96 % 10/02/20 1413 (!) 80/50  87    
10/02/20 1409 (!) 80/50  91 18 96 % 10/02/20 1349 (!) 82/51  91 29 95 % 10/02/20 1339 (!) 89/54  92 (!) 36 93 % 10/02/20 1314 (!) 83/51  92 24 93 % 10/02/20 1230 (!) 86/53  91  94 % 10/02/20 1214 (!) 83/50  91 (!) 34 92 % 10/02/20 1159 (!) 83/50  89  92 % 10/02/20 1144 (!) 91/50 98.2 °F (36.8 °C) 91 22 94 % 10/02/20 1129 (!) 87/50  90 (!) 36 91 % 10/02/20 1116 (!) 103/57  90 30 93 % 10/02/20 1029 (!) 90/54  83  92 % 10/02/20 1014 (!) 84/52  90 (!) 42 92 % 10/02/20 0959 (!) 88/54  92 (!) 39 95 % 10/02/20 0945 (!) 88/62  93 (!) 41 95 % 10/02/20 0929 (!) 100/57  90 29 90 % 10/02/20 0914 (!) 91/53  97 17 90 % 10/02/20 0859 (!) 86/52  97 (!) 37 96 % 10/02/20 0844 100/64    94 % 10/02/20 0830     95 % 10/02/20 0829 116/75  95  91 % 10/02/20 0814 102/68  97  95 % 10/02/20 0759 (!) 96/56  98  94 % 10/02/20 0744 106/62  (!) 101 26 90 % 10/02/20 0729 (!) 93/58  (!) 101 10 91 % 10/02/20 0714 107/63 98.5 °F (36.9 °C) (!) 101 (!) 33 92 % 10/02/20 0659 (!) 99/58  (!) 101  (!) 88 % 10/02/20 0644 (!) 93/57  (!) 102  94 % 10/02/20 0629 (!) 88/56  (!) 104 (!) 31 96 % 10/02/20 0559 (!) 97/59  (!) 104 11 (!) 89 % 10/02/20 0544 (!) 98/55  (!) 101 (!) 36 94 % 10/02/20 0529 106/65  (!) 105 25 92 % 10/02/20 0514 (!) 92/56  (!) 104 12 90 % 10/02/20 0459 (!) 92/56  (!) 102 (!) 42 97 % 10/02/20 0445 (!) 100/58  (!) 104 16 93 % 10/02/20 0429 (!) 102/58  (!) 101 (!) 38 94 % 10/02/20 0414 91/64  (!) 101 (!) 37 97 % 10/02/20 0359 (!) 94/58  100 (!) 51 98 % 10/02/20 0344 (!) 95/58  (!) 102 28 96 % 10/02/20 0329 (!) 105/57  100 (!) 32 96 % 10/02/20 0314 (!) 90/55  (!) 101 9 90 % 10/02/20 0259 (!) 90/55 98.4 °F (36.9 °C) 98 (!) 34 97 % 10/02/20 0245 (!) 92/57  100 30 94 % 10/02/20 0229 (!) 91/55  100 13 92 % 10/02/20 0214 (!) 109/57  98 (!) 35 95 % 10/02/20 0159 (!) 108/59  99 (!) 34 93 % 10/02/20 0144 (!) 88/56  (!) 102 11 99 % 10/02/20 0129 (!) 97/56  (!) 101 (!) 64 92 % 10/02/20 0114 104/60  (!) 102 (!) 32 96 % 10/02/20 0059 (!) 95/53  (!) 107 22 90 % 10/02/20 0044 102/65  (!) 107 25 98 % 10/02/20 0029 109/77  (!) 107 21 95 % 10/02/20 0014 107/66  (!) 107 24 97 % 10/01/20 2359 109/67  (!) 106 24 97 % 10/01/20 2344 100/65  (!) 108 21 97 % 10/01/20 2329 103/69  (!) 108 22 97 % 10/01/20 2314 110/66  (!) 109 15 97 % 10/01/20 2259 110/67 97.7 °F (36.5 °C) (!) 108 24 98 % 10/01/20 2244 111/67  (!) 105 (!) 37 99 % 10/01/20 2229 114/65  (!) 109 22 99 % 10/01/20 2214 115/68  (!) 109 16 96 % 10/01/20 2159 111/74  (!) 109 27 98 % 10/01/20 2144 112/67  (!) 108 21 98 % 10/01/20 2129 121/68  (!) 107 14 99 % 10/01/20 2114 122/71  (!) 107 17 98 % 10/01/20 2059 117/70  (!) 104 21 100 % 10/01/20 2045 115/67  (!) 103 19 100 % 10/01/20 2029 114/67  (!) 101 22 100 % 10/01/20 2014 113/64  100 22 100 % 10/01/20 1959 (!) 110/57  98 20 100 % 10/01/20 1944 111/67  93 24 100 % 10/01/20 1929 107/67  98 (!) 32 100 % Labs: 
Recent Labs 10/02/20 
8534 WBC 20.6* HGB 7.9*  
 * K 5.8*  
CL 98  
CO2 23 * CREA 6.70* * PTP 15.7* INR 1.2 TBILI 0.9 CBIL 0.4* ALT 53 * LPSE 2,463* Lab Results Component Value Date/Time  WBC 20.6 (H) 10/02/2020 03:38 AM  
 HGB 7.9 (L) 10/02/2020 03:38 AM  
 PLATELET 137 63/40/2192 03:38 AM  
 Sodium 134 (L) 10/02/2020 03:38 AM  
 Potassium 5.8 (H) 10/02/2020 03:38 AM  
 Chloride 98 10/02/2020 03:38 AM  
 CO2 23 10/02/2020 03:38 AM  
  (H) 10/02/2020 03:38 AM  
 Creatinine 6.70 (H) 10/02/2020 03:38 AM  
 Glucose 292 (H) 10/02/2020 03:38 AM  
 INR 1.2 10/02/2020 03:38 AM  
 Bilirubin, total 0.9 10/02/2020 03:38 AM  
 Bilirubin, direct 0.4 (H) 10/02/2020 03:38 AM  
 ALT (SGPT) 53 10/02/2020 03:38 AM  
 Alk. phosphatase 178 (H) 10/02/2020 03:38 AM  
 Lipase 2,463 (H) 10/02/2020 03:38 AM  
 
 
CT Results  (Last 48 hours) 10/02/20 1106  CT ABD PELV WO CONT Final result Impression:  IMPRESSION:  
1. Moderate bilateral pleural effusions and patchy bibasilar airspace opacities  
suspicious for pneumonia. 2. Moderate pericardial effusion which is partially imaged without evidence of  
mass effect upon the heart. 3. Distended/hydropic gallbladder with vicarious excretion of contrast.  
4. Small amount of air within the bladder. 5. Enteric tube coiled in the body of the stomach with tip in the gastric  
fundus. Narrative:  EXAM: CT of the abdomen and pelvis without contrast.  
Indication: Diffuse generalized abdominal pain. Comparison: Abdomen films from earlier in the day. Multiple axial images were obtained through the abdomen and pelvis without IV  
contrast.  Radiation dose reduction techniques were used for this study: All CT  
scans performed at this facility use one or all of the following: Automated  
exposure control, adjustment of the mA and/or kVp according to patient's size,  
iterative reconstruction. FINDINGS:  
LOWER THORAX: Moderate bilateral pleural effusions with bibasilar patchy  
airspace opacities and atelectatic change. Moderate pericardial effusion which  
is partially imaged. Mass effect upon the heart is LIVER: Unenhanced liver is unremarkable. BILIARY SYSTEM: Distended/hydropic gallbladder with vicarious excretion of  
contrast within the gallbladder. SPLEEN: No splenomegaly. PANCREAS: No pancreatic mass. No pancreatic duct dilation. ADRENALS: No adrenal nodule or adrenal hypertrophy. URINARY SYSTEM: Small amount of air within the bladder. No exophytic renal  
lesion. No hydronephrosis or nephrolithiasis. FLUID:  No free fluid. REPRODUCTIVE ORGANS: Unremarkable. BOWEL: Rectal catheter evident. Extensive sigmoid and descending colonic  
diverticulosis without diverticulitis. No evidence of bowel obstruction. Appendix is normal in appearance. Enteric tube coiled in the stomach with tip in  
the gastric fundus. There is a slight right inguinal small bowel containing  
hernia. VASCULAR/NODES: No aortic or iliac artery aneurysm. No lymphadenopathy. BONES/SUBCUTANEOUS TISSUE: No aggressive osseous lesion. No subcutaneous soft  
tissue abnormality. 10/01/20 0133  CT HEAD WO CONT Final result Impression:  IMPRESSION:  
   
No evidence of acute intracranial abnormalities. Date of Dictation: 10/1/2020 1:38 AM  
   
  
 Narrative:  CT HEAD WITHOUT CONTRAST HISTORY:  CVA, left hemiplegia. Deboraha Buys COMPARISON: None. TECHNIQUE: Axial imaging was performed without intravenous contrast utilizing 5mm slice thickness. Sagittal and coronal reformats were performed. Radiation  
dose reduction techniques were used for this study. Our CT scanner uses one or  
all of the following: Automated exposure control, adjustment of the MAS or KUB according to patient's  
size and iterative reconstruction. FINDINGS:      
   
*BRAIN:   
   -  There are no early signs of territorial or lacunar infarction by CT.  
   -  Symmetric supratentorial atrophy. -  For patient's age, the scattered areas of white matter hypodensities may  
represent a chronic small vessel white matter ischemia. However this is  
nonspecific. *VISUALIZED PARANASAL SINUSES: Well aerated. *MASTOIDS:  Clear. *CALVARIUM AND SCALP: Unremarkable. chest X-ray I reviewed recent labs, recent radiologic studies, and pertinent records including other doctor notes if needed. I independently reviewed radiology images for studies I described above or studies I have ordered. Admission date (for inpatients): 9/22/2020 * No surgery found *  * No surgery found * ASSESSMENT/PLAN: 
Problem List  Never Reviewed Codes Class Noted Generalized abdominal pain ICD-10-CM: R10.84 ICD-9-CM: 789.07  10/2/2020 Elevated lipase ICD-10-CM: R74.8 ICD-9-CM: 790.5  10/2/2020 Abnormal CT scan, gallbladder ICD-10-CM: R93.2 ICD-9-CM: 793.3  10/2/2020 LV (left ventricular) mural thrombus following MI ([facility] ICD-10-CM: I23.6 ICD-9-CM: 429.79, 410.82  10/1/2020 Metabolic encephalopathy [facility]-01-JR: G93.41 
ICD-9-CM: 348.31  10/1/2020 Cardiogenic shock ([facility] ICD-10-CM: R57.0 ICD-9-CM: 785.51  9/25/2020 Delirium ICD-10-CM: R41.0 ICD-9-CM: 780.09  9/25/2020 NSVT (nonsustained ventricular tachycardia) (HCC) ICD-10-CM: I47.2 ICD-9-CM: 427.1  9/25/2020 Acute renal failure (ARF) (HCC) ICD-10-CM: N17.9 ICD-9-CM: 584.9  9/23/2020 * (Principal) Acute ST elevation myocardial infarction (STEMI) involving left anterior descending (LAD) coronary artery ([facility] ICD-10-CM: I21.02 
ICD-9-CM: 410.10  9/22/2020 Hypertension ICD-10-CM: I10 
ICD-9-CM: 401.9  9/22/2020 Diabetes mellitus type 2, controlled ([facility] ICD-10-CM: E11.9 ICD-9-CM: 250.00  9/22/2020 Principal Problem: 
  Acute ST elevation myocardial infarction (STEMI) involving left anterior descending (LAD) coronary artery ([facility] (9/22/2020) Active Problems: Hypertension (9/22/2020) Diabetes mellitus type 2, controlled ([facility] (9/22/2020) Acute renal failure (ARF) ([facility] (9/23/2020) Cardiogenic shock ([facility] (9/25/2020) Delirium (9/25/2020) NSVT (nonsustained ventricular tachycardia) ([facility] (9/25/2020) LV (left ventricular) mural thrombus following MI ([facility] (10/1/2020) Metabolic encephalopathy (44/0/4478) Generalized abdominal pain (10/2/2020) Elevated lipase (10/2/2020) Abnormal CT scan, gallbladder (10/2/2020) Critically ill patient with hypotension on pressors with severe cardiomyopathy , recent STEMI, 
urinary, mental status changes, probable pneumonia on CT, elevated lipase, mildly elevated lactic acid,  
and reports of abdominal pain for 2 days. His CT shows no clear indications for emergent laparotomy. If his CT had shown indications for laparotomy I do not think he is a good surgical candidate. I agree that his prognosis is extremely poor. Continue supportive care for now with IV Abx for probable pneumonia which could also cover for possible cholecystitis based on CT. I discussed case with pt's daughter His CT did not show significant signs of peripancreatic edema to correlate with his elevated lipase. It did show a distended gallbladder but there was no esme-cholecystic inflammatory change. We could consider a HIDA scan to evaluate the gallbladder further. I will wait for the mesenteric ultrasound I ordered before deciding if we should proceed in that direction. I have personally performed a face-to-face diagnostic evaluation and management  service on this patient. I have independently seen the patient. I have independently obtained the above history from the patient/family. I have independently examined the patient with above findings. I have independently reviewed data/labs for this patient and developed the above plan of care (MDM). Signed: Cristy Saavedra.  Burgess Blade MD, FACS

## 2020-10-02 NOTE — PROGRESS NOTES
A follow up visit was made to the patient. Emotional support, spiritual presence and  
prayer were provided for the patient. His wife and daughter were present. The patient was not alert. ELKIN Redman

## 2020-10-02 NOTE — PROGRESS NOTES
Bedside shift report given to Ashley Strong RN (oncoming nurse) by Julio Smyth RN (offgoing nurse). Bedside shift report included the following information: SBAR, Kardex, Procedure Summary, MAR, and Recent Results.

## 2020-10-03 NOTE — PROGRESS NOTES
Pt's O2 sat began to fall into the low 90's. Pt's airway was suctioned out, and O2 probe was changed at 0135 showing slight increases in O2. Pt stopped breathing, and was bagged at 0145 at which point the code blue was called. Pt was given a total of  1 amp of atropine  At 0146 followed by another 0.5 amp after the pulse was lost at 0151 Pt received 1 amp of epinephrine at 0149. Pt's daughter Corey Niño was contacted during code blue and instructed the staff to not preform CPR or preform further resuscitation effort,  and to \"let him pass\" at 0150. Pt was pronounced at 0209, family was contacted and arrangements where made for them to visit the pt. 
 
0210 Pastoral care was called to be with the family

## 2020-10-03 NOTE — DISCHARGE SUMMARY
[facility] Cardiology Death Summary Patient ID: Isak Edward 
[id number] 
70 y.o. 
1940 Admit date: 9/22/2020 Date of Death: 10/3/2020 Time of Death: 7249 Admitting Physician: Trae Owens MD  
 
Discharge Physician: Dr. Cindy Gillette Admission Diagnoses: STEMI (ST elevation myocardial infarction) ([facility] [I21.3] Discharge Diagnoses:  
 Diagnosis  Generalized abdominal pain  Elevated lipase  Abnormal CT scan, gallbladder  Pneumonia suggested on CT scan 10/2/20  LV (left ventricular) mural thrombus following MI   
 Metabolic encephalopathy  Cardiogenic shock  Delirium  NSVT (nonsustained ventricular tachycardia)  Acute renal failure (ARF)  Acute ST elevation myocardial infarction (STEMI) involving left anterior descending (LAD) coronary artery  Hypertension  Diabetes mellitus type 2, controlled Cardiology Procedures this admission:  Left heart catheterization with PCI Impella CP placement, Anthony-Richard placement Consults: Pulmonary/Intensive care, ID, Nephrology, Neurology, General Surgery, Hospitalist and Palliative Care HPI: Patient is a 79 y.o. male who presented to [place] with CP w EKG showing ST w anterior STEMI. H/O htn and DM. Pt transferred emerg[place] for [facility]. Pt speaks only Croatian and history obtained per chart and through . He began having severe chest pressure one hour earlier w SOB and diaphoresis, worse w exertion, pain severe. Pt given ASA, heparin and started on a nitro drip. Initial /99, decreased to 126/80 en route. Pt continuing to have severe pain, unable to quantify. NKDA. Unable to obtain any further HPI due to urgent need for LHC. Hospital Course: Patient was taken emergently to the [address] [place] [address] by Dr. Jeovanny Fajardo. He was found to be in cardiogenic shock with hypotension and LVEDP 38 and an Impella CP was placed for hemodynamic support.  He had brief NSVT that resolved and was treated with IV Amiodarone. He was found to have 100% occlusion of the mLAD that was stented with overalpping 3.5 x 26 Felice JORDAN and 2.75 x 38 Felice JORDAN with 0% residual stenosis. Judith Tavares was placed for hemodynamic monitoring and the Pt was taken to the ICU for recovery and further management. The Impella was successfully removed on 20. However, Pts post-MI course was complicated by LV apical thrombus, acute UTI, hyperkalemia and metabolic acidosis due to acute renal failure requiring dialysis, AMS and encephalopathy, possible embolic CVA and abdominal pain of unclear source. Despite maximum interventions Pt continued to decline with persistent hypotension requiring Levophed infusion and persistent AMS. Pt was at one point a DNR, but was reversed to back to FULL CODE. Pt with sudden O2 desat requiring suctioning by RN. Then Pt with respiratory arrest. CODE BLUE was called at 0145 and appropriate staff responded. Pt was given 1 amp EPI and 1.5 amps Atropine. Family was called and Pts daughter decided to stop further resuscitation efforts (no CPR compressions or intubation) and requested to allow Pt to pass peacefully. Pts SO was at bedside with Pt as he . Pt pronounced  at [address] [facility] AM on 10/3/20. Disposition: Body to be released to  home of choice.   
 
 
 
Signed: 
Cathy Tong NP-RIP 
10/3/2020 
2:35 AM

## 2020-10-03 NOTE — PROGRESS NOTES
I was called to pronounce Mr Inge Rush. Arrived at bedside to examine. There were no audible spontaneous breaths or heart beats x 2 mins. No corneal reflex. Patient was pronounced dead at 80 AM. 
 
May his soul rest in peace.

## 2020-10-06 LAB
BACTERIA SPEC CULT: NORMAL
BACTERIA SPEC CULT: NORMAL
SERVICE CMNT-IMP: NORMAL
SERVICE CMNT-IMP: NORMAL
